# Patient Record
Sex: FEMALE | Race: WHITE | NOT HISPANIC OR LATINO | Employment: OTHER | URBAN - METROPOLITAN AREA
[De-identification: names, ages, dates, MRNs, and addresses within clinical notes are randomized per-mention and may not be internally consistent; named-entity substitution may affect disease eponyms.]

---

## 2019-10-15 ENCOUNTER — OFFICE VISIT (OUTPATIENT)
Dept: ORTHOPEDIC SURGERY | Facility: CLINIC | Age: 34
End: 2019-10-15

## 2019-10-15 VITALS
HEART RATE: 102 BPM | DIASTOLIC BLOOD PRESSURE: 81 MMHG | SYSTOLIC BLOOD PRESSURE: 121 MMHG | WEIGHT: 175 LBS | BODY MASS INDEX: 28.12 KG/M2 | HEIGHT: 66 IN

## 2019-10-15 DIAGNOSIS — M51.37 DEGENERATION OF LUMBAR/LUMBOSACRAL DISC WITHOUT MYELOPATHY: ICD-10-CM

## 2019-10-15 DIAGNOSIS — M51.36 ANNULAR TEAR OF LUMBAR DISC: ICD-10-CM

## 2019-10-15 DIAGNOSIS — M25.551 RIGHT HIP PAIN: Primary | ICD-10-CM

## 2019-10-15 PROCEDURE — 99202 OFFICE O/P NEW SF 15 MIN: CPT | Performed by: PHYSICIAN ASSISTANT

## 2019-10-15 RX ORDER — IBUPROFEN 800 MG/1
800 TABLET ORAL EVERY 8 HOURS PRN
Refills: 3 | COMMUNITY
Start: 2019-10-07 | End: 2021-08-23

## 2019-10-15 RX ORDER — LEVONORGESTREL / ETHINYL ESTRADIOL AND ETHINYL ESTRADIOL 150-30(84)
1 KIT ORAL DAILY
Refills: 4 | COMMUNITY
Start: 2019-07-23 | End: 2020-11-02

## 2019-10-15 RX ORDER — DULOXETIN HYDROCHLORIDE 30 MG/1
30 CAPSULE, DELAYED RELEASE ORAL DAILY
Refills: 5 | COMMUNITY
Start: 2019-09-20 | End: 2020-11-02

## 2019-10-15 RX ORDER — GABAPENTIN 300 MG/1
CAPSULE ORAL
Qty: 60 CAPSULE | Refills: 0 | Status: SHIPPED | OUTPATIENT
Start: 2019-10-15 | End: 2020-11-02
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
## 2020-11-02 ENCOUNTER — APPOINTMENT (OUTPATIENT)
Dept: GENERAL RADIOLOGY | Facility: HOSPITAL | Age: 35
End: 2020-11-02

## 2020-11-02 ENCOUNTER — HOSPITAL ENCOUNTER (OUTPATIENT)
Facility: HOSPITAL | Age: 35
Setting detail: OBSERVATION
Discharge: HOME | End: 2020-11-03
Attending: INTERNAL MEDICINE | Admitting: INTERNAL MEDICINE

## 2020-11-02 DIAGNOSIS — R06.00 DYSPNEA, UNSPECIFIED TYPE: ICD-10-CM

## 2020-11-02 DIAGNOSIS — R07.9 CHEST PAIN, UNSPECIFIED TYPE: ICD-10-CM

## 2020-11-02 DIAGNOSIS — U07.1 CLINICAL DIAGNOSIS OF COVID-19: Primary | ICD-10-CM

## 2020-11-02 DIAGNOSIS — R00.0 TACHYCARDIA: ICD-10-CM

## 2020-11-02 LAB
ABO GROUP BLD: NORMAL
ALBUMIN SERPL-MCNC: 4.4 G/DL (ref 3.5–5.2)
ALBUMIN/GLOB SERPL: 1.5 G/DL
ALP SERPL-CCNC: 64 U/L (ref 39–117)
ALT SERPL W P-5'-P-CCNC: 15 U/L (ref 1–33)
AMPHET+METHAMPHET UR QL: NEGATIVE
ANION GAP SERPL CALCULATED.3IONS-SCNC: 19 MMOL/L (ref 5–15)
ARTERIAL PATENCY WRIST A: POSITIVE
AST SERPL-CCNC: 15 U/L (ref 1–32)
ATMOSPHERIC PRESS: ABNORMAL MM[HG]
B PARAPERT DNA SPEC QL NAA+PROBE: NOT DETECTED
B PERT DNA SPEC QL NAA+PROBE: NOT DETECTED
BACTERIA UR QL AUTO: ABNORMAL /HPF
BARBITURATES UR QL SCN: NEGATIVE
BASE EXCESS BLDA CALC-SCNC: -5 MMOL/L (ref 0–3)
BDY SITE: ABNORMAL
BENZODIAZ UR QL SCN: NEGATIVE
BILIRUB SERPL-MCNC: <0.2 MG/DL (ref 0–1.2)
BILIRUB UR QL STRIP: NEGATIVE
BLD GP AB SCN SERPL QL: NEGATIVE
BUN SERPL-MCNC: 10 MG/DL (ref 6–20)
BUN/CREAT SERPL: 11.2 (ref 7–25)
C PNEUM DNA NPH QL NAA+NON-PROBE: NOT DETECTED
CALCIUM SPEC-SCNC: 8.9 MG/DL (ref 8.6–10.5)
CANNABINOIDS SERPL QL: NEGATIVE
CHLORIDE SERPL-SCNC: 103 MMOL/L (ref 98–107)
CLARITY UR: CLEAR
CO2 BLDA-SCNC: 20.4 MMOL/L (ref 22–29)
CO2 SERPL-SCNC: 19 MMOL/L (ref 22–29)
COCAINE UR QL: NEGATIVE
COLOR UR: YELLOW
CREAT SERPL-MCNC: 0.89 MG/DL (ref 0.57–1)
CRP SERPL-MCNC: 0.33 MG/DL (ref 0–0.5)
CRP SERPL-MCNC: 0.48 MG/DL (ref 0–0.5)
D-LACTATE SERPL-SCNC: 4.5 MMOL/L (ref 0.5–2)
D-LACTATE SERPL-SCNC: 4.8 MMOL/L (ref 0.5–2)
D-LACTATE SERPL-SCNC: 5.2 MMOL/L (ref 0.5–2)
DEPRECATED RDW RBC AUTO: 43.3 FL (ref 37–54)
ERYTHROCYTE [DISTWIDTH] IN BLOOD BY AUTOMATED COUNT: 13.4 % (ref 12.3–15.4)
FERRITIN SERPL-MCNC: 26.78 NG/ML (ref 13–150)
FERRITIN SERPL-MCNC: 30.48 NG/ML (ref 13–150)
FLUAV H1 2009 PAND RNA NPH QL NAA+PROBE: NOT DETECTED
FLUAV H1 HA GENE NPH QL NAA+PROBE: NOT DETECTED
FLUAV H3 RNA NPH QL NAA+PROBE: NOT DETECTED
FLUAV SUBTYP SPEC NAA+PROBE: NOT DETECTED
FLUBV RNA ISLT QL NAA+PROBE: NOT DETECTED
GFR SERPL CREATININE-BSD FRML MDRD: 72 ML/MIN/1.73
GLOBULIN UR ELPH-MCNC: 2.9 GM/DL
GLUCOSE SERPL-MCNC: 167 MG/DL (ref 65–99)
GLUCOSE UR STRIP-MCNC: NEGATIVE MG/DL
HADV DNA SPEC NAA+PROBE: NOT DETECTED
HCO3 BLDA-SCNC: 19.4 MMOL/L (ref 21–28)
HCOV 229E RNA SPEC QL NAA+PROBE: NOT DETECTED
HCOV HKU1 RNA SPEC QL NAA+PROBE: NOT DETECTED
HCOV NL63 RNA SPEC QL NAA+PROBE: NOT DETECTED
HCOV OC43 RNA SPEC QL NAA+PROBE: NOT DETECTED
HCT VFR BLD AUTO: 36.9 % (ref 34–46.6)
HEMODILUTION: NO
HGB BLD-MCNC: 12.2 G/DL (ref 12–15.9)
HGB UR QL STRIP.AUTO: ABNORMAL
HMPV RNA NPH QL NAA+NON-PROBE: NOT DETECTED
HOLD SPECIMEN: NORMAL
HPIV1 RNA SPEC QL NAA+PROBE: NOT DETECTED
HPIV2 RNA SPEC QL NAA+PROBE: NOT DETECTED
HPIV3 RNA NPH QL NAA+PROBE: NOT DETECTED
HPIV4 P GENE NPH QL NAA+PROBE: NOT DETECTED
HYALINE CASTS UR QL AUTO: ABNORMAL /LPF
INHALED O2 CONCENTRATION: 21 %
KETONES UR QL STRIP: NEGATIVE
LACTATE HOLD SPECIMEN: NORMAL
LDH SERPL-CCNC: 132 U/L (ref 135–214)
LDH SERPL-CCNC: 175 U/L (ref 135–214)
LEUKOCYTE ESTERASE UR QL STRIP.AUTO: NEGATIVE
LYMPHOCYTES # BLD MANUAL: 1.28 10*3/MM3 (ref 0.7–3.1)
LYMPHOCYTES NFR BLD MANUAL: 16 % (ref 19.6–45.3)
LYMPHOCYTES NFR BLD MANUAL: 2 % (ref 5–12)
M PNEUMO IGG SER IA-ACNC: NOT DETECTED
MCH RBC QN AUTO: 30.4 PG (ref 26.6–33)
MCHC RBC AUTO-ENTMCNC: 32.9 G/DL (ref 31.5–35.7)
MCV RBC AUTO: 92.2 FL (ref 79–97)
METAMYELOCYTES NFR BLD MANUAL: 2 % (ref 0–0)
METHADONE UR QL SCN: NEGATIVE
MODALITY: ABNORMAL
MONOCYTES # BLD AUTO: 0.16 10*3/MM3 (ref 0.1–0.9)
NEUTROPHILS # BLD AUTO: 6.4 10*3/MM3 (ref 1.7–7)
NEUTROPHILS NFR BLD MANUAL: 77 % (ref 42.7–76)
NEUTS BAND NFR BLD MANUAL: 3 % (ref 0–5)
NITRITE UR QL STRIP: NEGATIVE
OPIATES UR QL: NEGATIVE
OXYCODONE UR QL SCN: NEGATIVE
PCO2 BLDA: 33.5 MM HG (ref 35–48)
PH BLDA: 7.37 PH UNITS (ref 7.35–7.45)
PH UR STRIP.AUTO: 6.5 [PH] (ref 5–8)
PLAT MORPH BLD: NORMAL
PLATELET # BLD AUTO: 207 10*3/MM3 (ref 140–450)
PMV BLD AUTO: 9.1 FL (ref 6–12)
PO2 BLDA: 92.5 MM HG (ref 83–108)
POTASSIUM SERPL-SCNC: 3.4 MMOL/L (ref 3.5–5.2)
PROCALCITONIN SERPL-MCNC: 0.02 NG/ML (ref 0–0.25)
PROT SERPL-MCNC: 7.3 G/DL (ref 6–8.5)
PROT UR QL STRIP: NEGATIVE
RBC # BLD AUTO: 4 10*6/MM3 (ref 3.77–5.28)
RBC # UR: ABNORMAL /HPF
RBC MORPH BLD: NORMAL
REF LAB TEST METHOD: ABNORMAL
RH BLD: POSITIVE
RHINOVIRUS RNA SPEC NAA+PROBE: NOT DETECTED
RSV RNA NPH QL NAA+NON-PROBE: NOT DETECTED
SAO2 % BLDCOA: 97.1 % (ref 94–98)
SARS-COV-2 RNA NPH QL NAA+NON-PROBE: DETECTED
SCAN SLIDE: NORMAL
SODIUM SERPL-SCNC: 141 MMOL/L (ref 136–145)
SP GR UR STRIP: 1.01 (ref 1–1.03)
SQUAMOUS #/AREA URNS HPF: ABNORMAL /HPF
T&S EXPIRATION DATE: NORMAL
TROPONIN T SERPL-MCNC: <0.01 NG/ML (ref 0–0.03)
TROPONIN T SERPL-MCNC: <0.01 NG/ML (ref 0–0.03)
UROBILINOGEN UR QL STRIP: ABNORMAL
WBC # BLD AUTO: 8 10*3/MM3 (ref 3.4–10.8)
WBC MORPH BLD: NORMAL
WBC UR QL AUTO: ABNORMAL /HPF

## 2020-11-02 PROCEDURE — 93005 ELECTROCARDIOGRAM TRACING: CPT | Performed by: NURSE PRACTITIONER

## 2020-11-02 PROCEDURE — 80307 DRUG TEST PRSMV CHEM ANLYZR: CPT | Performed by: NURSE PRACTITIONER

## 2020-11-02 PROCEDURE — 85007 BL SMEAR W/DIFF WBC COUNT: CPT | Performed by: NURSE PRACTITIONER

## 2020-11-02 PROCEDURE — 87205 SMEAR GRAM STAIN: CPT | Performed by: PHYSICIAN ASSISTANT

## 2020-11-02 PROCEDURE — 80053 COMPREHEN METABOLIC PANEL: CPT | Performed by: NURSE PRACTITIONER

## 2020-11-02 PROCEDURE — 87899 AGENT NOS ASSAY W/OPTIC: CPT | Performed by: PHYSICIAN ASSISTANT

## 2020-11-02 PROCEDURE — 86850 RBC ANTIBODY SCREEN: CPT | Performed by: PHYSICIAN ASSISTANT

## 2020-11-02 PROCEDURE — 99284 EMERGENCY DEPT VISIT MOD MDM: CPT

## 2020-11-02 PROCEDURE — 87040 BLOOD CULTURE FOR BACTERIA: CPT | Performed by: NURSE PRACTITIONER

## 2020-11-02 PROCEDURE — 84484 ASSAY OF TROPONIN QUANT: CPT | Performed by: NURSE PRACTITIONER

## 2020-11-02 PROCEDURE — 86901 BLOOD TYPING SEROLOGIC RH(D): CPT

## 2020-11-02 PROCEDURE — G0378 HOSPITAL OBSERVATION PER HR: HCPCS

## 2020-11-02 PROCEDURE — 99219 PR INITIAL OBSERVATION CARE/DAY 50 MINUTES: CPT | Performed by: PHYSICIAN ASSISTANT

## 2020-11-02 PROCEDURE — 85025 COMPLETE CBC W/AUTO DIFF WBC: CPT | Performed by: NURSE PRACTITIONER

## 2020-11-02 PROCEDURE — 82728 ASSAY OF FERRITIN: CPT | Performed by: NURSE PRACTITIONER

## 2020-11-02 PROCEDURE — 84145 PROCALCITONIN (PCT): CPT | Performed by: NURSE PRACTITIONER

## 2020-11-02 PROCEDURE — 84484 ASSAY OF TROPONIN QUANT: CPT | Performed by: PHYSICIAN ASSISTANT

## 2020-11-02 PROCEDURE — 83605 ASSAY OF LACTIC ACID: CPT

## 2020-11-02 PROCEDURE — 71045 X-RAY EXAM CHEST 1 VIEW: CPT

## 2020-11-02 PROCEDURE — 86140 C-REACTIVE PROTEIN: CPT | Performed by: NURSE PRACTITIONER

## 2020-11-02 PROCEDURE — 86140 C-REACTIVE PROTEIN: CPT | Performed by: PHYSICIAN ASSISTANT

## 2020-11-02 PROCEDURE — 86901 BLOOD TYPING SEROLOGIC RH(D): CPT | Performed by: PHYSICIAN ASSISTANT

## 2020-11-02 PROCEDURE — 96375 TX/PRO/DX INJ NEW DRUG ADDON: CPT

## 2020-11-02 PROCEDURE — 0202U NFCT DS 22 TRGT SARS-COV-2: CPT | Performed by: NURSE PRACTITIONER

## 2020-11-02 PROCEDURE — 87185 SC STD ENZYME DETCJ PER NZM: CPT | Performed by: PHYSICIAN ASSISTANT

## 2020-11-02 PROCEDURE — 83615 LACTATE (LD) (LDH) ENZYME: CPT | Performed by: NURSE PRACTITIONER

## 2020-11-02 PROCEDURE — 25010000002 AZITHROMYCIN PER 500 MG: Performed by: PHYSICIAN ASSISTANT

## 2020-11-02 PROCEDURE — 87070 CULTURE OTHR SPECIMN AEROBIC: CPT | Performed by: PHYSICIAN ASSISTANT

## 2020-11-02 PROCEDURE — 86900 BLOOD TYPING SEROLOGIC ABO: CPT

## 2020-11-02 PROCEDURE — 83615 LACTATE (LD) (LDH) ENZYME: CPT | Performed by: PHYSICIAN ASSISTANT

## 2020-11-02 PROCEDURE — 25010000002 CEFTRIAXONE PER 250 MG: Performed by: PHYSICIAN ASSISTANT

## 2020-11-02 PROCEDURE — 82728 ASSAY OF FERRITIN: CPT | Performed by: PHYSICIAN ASSISTANT

## 2020-11-02 PROCEDURE — 82803 BLOOD GASES ANY COMBINATION: CPT

## 2020-11-02 PROCEDURE — 85379 FIBRIN DEGRADATION QUANT: CPT | Performed by: PHYSICIAN ASSISTANT

## 2020-11-02 PROCEDURE — 96365 THER/PROPH/DIAG IV INF INIT: CPT

## 2020-11-02 PROCEDURE — 86900 BLOOD TYPING SEROLOGIC ABO: CPT | Performed by: PHYSICIAN ASSISTANT

## 2020-11-02 PROCEDURE — 81001 URINALYSIS AUTO W/SCOPE: CPT | Performed by: NURSE PRACTITIONER

## 2020-11-02 PROCEDURE — 36600 WITHDRAWAL OF ARTERIAL BLOOD: CPT

## 2020-11-02 RX ORDER — AMITRIPTYLINE HYDROCHLORIDE 50 MG/1
150 TABLET, FILM COATED ORAL NIGHTLY
Status: DISCONTINUED | OUTPATIENT
Start: 2020-11-02 | End: 2020-11-03 | Stop reason: HOSPADM

## 2020-11-02 RX ORDER — BENZONATATE 100 MG/1
200 CAPSULE ORAL 3 TIMES DAILY PRN
Status: DISCONTINUED | OUTPATIENT
Start: 2020-11-02 | End: 2020-11-03 | Stop reason: HOSPADM

## 2020-11-02 RX ORDER — IBUPROFEN 400 MG/1
800 TABLET ORAL EVERY 8 HOURS PRN
Status: DISCONTINUED | OUTPATIENT
Start: 2020-11-02 | End: 2020-11-03 | Stop reason: HOSPADM

## 2020-11-02 RX ORDER — NITROGLYCERIN 0.4 MG/1
0.4 TABLET SUBLINGUAL
Status: DISCONTINUED | OUTPATIENT
Start: 2020-11-02 | End: 2020-11-03 | Stop reason: HOSPADM

## 2020-11-02 RX ORDER — ACETAMINOPHEN 160 MG/5ML
650 SOLUTION ORAL EVERY 4 HOURS PRN
Status: DISCONTINUED | OUTPATIENT
Start: 2020-11-02 | End: 2020-11-03 | Stop reason: HOSPADM

## 2020-11-02 RX ORDER — GUAIFENESIN 600 MG/1
1200 TABLET, EXTENDED RELEASE ORAL EVERY 12 HOURS SCHEDULED
Status: DISCONTINUED | OUTPATIENT
Start: 2020-11-02 | End: 2020-11-03 | Stop reason: HOSPADM

## 2020-11-02 RX ORDER — ASCORBIC ACID 500 MG
500 TABLET ORAL 3 TIMES DAILY
Status: DISCONTINUED | OUTPATIENT
Start: 2020-11-02 | End: 2020-11-03

## 2020-11-02 RX ORDER — PHENTERMINE HYDROCHLORIDE 37.5 MG/1
37.5 TABLET ORAL
COMMUNITY
End: 2021-08-23 | Stop reason: ALTCHOICE

## 2020-11-02 RX ORDER — LORAZEPAM 1 MG/1
1 TABLET ORAL EVERY 12 HOURS PRN
COMMUNITY
End: 2021-08-23 | Stop reason: ALTCHOICE

## 2020-11-02 RX ORDER — BISACODYL 10 MG
10 SUPPOSITORY, RECTAL RECTAL DAILY PRN
Status: DISCONTINUED | OUTPATIENT
Start: 2020-11-02 | End: 2020-11-03 | Stop reason: HOSPADM

## 2020-11-02 RX ORDER — MAGNESIUM SULFATE HEPTAHYDRATE 40 MG/ML
2 INJECTION, SOLUTION INTRAVENOUS AS NEEDED
Status: DISCONTINUED | OUTPATIENT
Start: 2020-11-02 | End: 2020-11-03 | Stop reason: HOSPADM

## 2020-11-02 RX ORDER — LORAZEPAM 1 MG/1
1 TABLET ORAL EVERY 12 HOURS PRN
Status: DISCONTINUED | OUTPATIENT
Start: 2020-11-02 | End: 2020-11-03 | Stop reason: HOSPADM

## 2020-11-02 RX ORDER — SODIUM CHLORIDE 0.9 % (FLUSH) 0.9 %
10 SYRINGE (ML) INJECTION AS NEEDED
Status: DISCONTINUED | OUTPATIENT
Start: 2020-11-02 | End: 2020-11-03 | Stop reason: HOSPADM

## 2020-11-02 RX ORDER — AMITRIPTYLINE HYDROCHLORIDE 150 MG/1
150 TABLET, FILM COATED ORAL NIGHTLY
COMMUNITY
End: 2021-08-23 | Stop reason: ALTCHOICE

## 2020-11-02 RX ORDER — ALUMINA, MAGNESIA, AND SIMETHICONE 2400; 2400; 240 MG/30ML; MG/30ML; MG/30ML
15 SUSPENSION ORAL EVERY 6 HOURS PRN
Status: DISCONTINUED | OUTPATIENT
Start: 2020-11-02 | End: 2020-11-03 | Stop reason: HOSPADM

## 2020-11-02 RX ORDER — POTASSIUM CHLORIDE 20 MEQ/1
40 TABLET, EXTENDED RELEASE ORAL AS NEEDED
Status: DISCONTINUED | OUTPATIENT
Start: 2020-11-02 | End: 2020-11-03 | Stop reason: HOSPADM

## 2020-11-02 RX ORDER — IPRATROPIUM BROMIDE AND ALBUTEROL SULFATE 2.5; .5 MG/3ML; MG/3ML
3 SOLUTION RESPIRATORY (INHALATION) EVERY 4 HOURS PRN
Status: DISCONTINUED | OUTPATIENT
Start: 2020-11-02 | End: 2020-11-02

## 2020-11-02 RX ORDER — FAMOTIDINE 10 MG/ML
20 INJECTION, SOLUTION INTRAVENOUS DAILY
Status: DISCONTINUED | OUTPATIENT
Start: 2020-11-03 | End: 2020-11-03

## 2020-11-02 RX ORDER — ACETAMINOPHEN 325 MG/1
650 TABLET ORAL EVERY 4 HOURS PRN
Status: DISCONTINUED | OUTPATIENT
Start: 2020-11-02 | End: 2020-11-03 | Stop reason: HOSPADM

## 2020-11-02 RX ORDER — ALBUTEROL SULFATE 90 UG/1
2 AEROSOL, METERED RESPIRATORY (INHALATION) EVERY 4 HOURS PRN
Status: DISCONTINUED | OUTPATIENT
Start: 2020-11-02 | End: 2020-11-03 | Stop reason: HOSPADM

## 2020-11-02 RX ORDER — ACETAMINOPHEN 650 MG/1
650 SUPPOSITORY RECTAL EVERY 4 HOURS PRN
Status: DISCONTINUED | OUTPATIENT
Start: 2020-11-02 | End: 2020-11-03 | Stop reason: HOSPADM

## 2020-11-02 RX ORDER — MAGNESIUM SULFATE 1 G/100ML
1 INJECTION INTRAVENOUS AS NEEDED
Status: DISCONTINUED | OUTPATIENT
Start: 2020-11-02 | End: 2020-11-03 | Stop reason: HOSPADM

## 2020-11-02 RX ORDER — ONDANSETRON 4 MG/1
4 TABLET, FILM COATED ORAL EVERY 6 HOURS PRN
Status: DISCONTINUED | OUTPATIENT
Start: 2020-11-02 | End: 2020-11-03 | Stop reason: HOSPADM

## 2020-11-02 RX ORDER — ONDANSETRON 2 MG/ML
4 INJECTION INTRAMUSCULAR; INTRAVENOUS EVERY 6 HOURS PRN
Status: DISCONTINUED | OUTPATIENT
Start: 2020-11-02 | End: 2020-11-03 | Stop reason: HOSPADM

## 2020-11-02 RX ORDER — CHOLECALCIFEROL (VITAMIN D3) 125 MCG
5 CAPSULE ORAL NIGHTLY PRN
Status: DISCONTINUED | OUTPATIENT
Start: 2020-11-02 | End: 2020-11-03

## 2020-11-02 RX ORDER — SODIUM CHLORIDE 0.9 % (FLUSH) 0.9 %
10 SYRINGE (ML) INJECTION EVERY 12 HOURS SCHEDULED
Status: DISCONTINUED | OUTPATIENT
Start: 2020-11-02 | End: 2020-11-03 | Stop reason: HOSPADM

## 2020-11-02 RX ADMIN — LORAZEPAM 1 MG: 1 TABLET ORAL at 23:46

## 2020-11-02 RX ADMIN — GUAIFENESIN 1200 MG: 600 TABLET, EXTENDED RELEASE ORAL at 23:21

## 2020-11-02 RX ADMIN — SODIUM CHLORIDE 500 ML: 9 INJECTION, SOLUTION INTRAVENOUS at 22:50

## 2020-11-02 RX ADMIN — CEFTRIAXONE SODIUM 1 G: 10 INJECTION, POWDER, FOR SOLUTION INTRAVENOUS at 22:50

## 2020-11-02 RX ADMIN — SODIUM CHLORIDE 1500 ML: 0.9 INJECTION, SOLUTION INTRAVENOUS at 20:25

## 2020-11-02 RX ADMIN — OXYCODONE HYDROCHLORIDE AND ACETAMINOPHEN 500 MG: 500 TABLET ORAL at 23:21

## 2020-11-02 RX ADMIN — SODIUM CHLORIDE 500 MG: 900 INJECTION, SOLUTION INTRAVENOUS at 22:50

## 2020-11-02 RX ADMIN — AMITRIPTYLINE HYDROCHLORIDE 150 MG: 50 TABLET, FILM COATED ORAL at 23:21

## 2020-11-02 RX ADMIN — Medication 10 ML: at 22:50

## 2020-11-02 RX ADMIN — SODIUM CHLORIDE 1000 ML: 9 INJECTION, SOLUTION INTRAVENOUS at 17:40

## 2020-11-02 NOTE — ED PROVIDER NOTES
Subjective       Chief complaint: Multiple complaints      Context: Patient is a 34-year-old female who comes in complaining of nausea cough productive of green sputum nasal congestion shortness of breath fever chest pain.  She denies any swelling in her legs or feet.  She states she was diagnosed with Covid approximately a week ago at [place] urgent care.  She was seen at [facility] last night for the same complaints and had a negative CT of her chest and was sent home.  She states she is not feeling any better and continues to feel worse.  She has no history of DVT or PE.  No family history of cardiac disease.  Is never had a stress test or heart cath.       Duration: Worse over the last week    Timing: Waxes and wanes    Severity: Moderate    Associated symptoms:She states her fever is mildly controlled with ibuprofen          PCP: Keith      LNMP: 10/30/2020            Review of Systems   Constitutional: Positive for fever.   HENT: Positive for congestion.    Eyes: Negative for visual disturbance.   Respiratory: Positive for cough and shortness of breath.    Cardiovascular: Positive for chest pain. Negative for palpitations and leg swelling.   Gastrointestinal: Positive for nausea and vomiting.   Endocrine: Negative for polyuria.   Genitourinary: Negative.    Musculoskeletal: Positive for myalgias.   Skin: Negative.    Allergic/Immunologic: Negative for immunocompromised state.   Neurological: Negative.    Hematological: Does not bruise/bleed easily.   Psychiatric/Behavioral: Negative for confusion.       Past Medical History:   Diagnosis Date   • Anxiety    • Back pain        Allergies   Allergen Reactions   • Paxil [Paroxetine] Unknown - Low Severity     EPS       Past Surgical History:   Procedure Laterality Date   •  SECTION  2016 and 2018   • RADIOFREQUENCY ABLATION  2019   • TONSILLECTOMY  2018   • TONSILLECTOMY         Family History   Problem Relation Age of Onset   •  Cancer Maternal Grandmother    • Hypertension Paternal Grandmother    • Heart disease Paternal Grandmother        Social History     Socioeconomic History   • Marital status:      Spouse name: Not on file   • Number of children: Not on file   • Years of education: Not on file   • Highest education level: Not on file   Tobacco Use   • Smoking status: Never Smoker   • Smokeless tobacco: Never Used   Substance and Sexual Activity   • Alcohol use: No     Frequency: Never   • Drug use: No   • Sexual activity: Defer           Objective   Physical Exam     Vital signs and triage nurse note reviewed.   Constitutional: Awake, alert;   HEENT: Normocephalic, atraumatic; pupils are PERRL with intact EOM; oropharynx is pink and dry without exudate or erythema.   Neck: Supple, full range of motion without pain; no cervical lymphadenopathy.   Cardiovascular: Tachycardic regular rate and rhythm, normal S1-S2.   Pulmonary: Respiratory effort regular nonlabored, breath sounds diminished bilateral lower lobes   Abdomen: Soft, nontender nondistended with normoactive bowel sounds; no rebound or guarding.   Musculoskeletal: Independent range of motion of all extremities with no palpable tenderness or edema.   Neuro: Alert oriented x3, speech is clear and appropriate, GCS 15   Skin:  Fleshtone warm, dry, intact; no erythematous or petechial rash or lesion       ECG 12 Lead      Date/Time: 11/2/2020 6:40 PM  Performed by: Ml Baker APRN  Authorized by: Ml Baker APRN   Interpreted by physician (marilee)  Previous ECG: no previous ECG available  Rhythm: sinus tachycardia  BPM: 130                   ED Course  ED Course as of Nov 03 0048   Mon Nov 02, 2020   1846 Nursing staff report patient is now screening positive for sepsis. BC and poc lactic obtained. Abx deferred as there is no obvious secondary infection    [JW]   1955 Staff report lactic of 4.8 but state tourniquet was on for extended time. Instructed to  bj.     [JW]   2008 Report repeat lactic 4.5. pt not hypotensive but was ordered additional 1.5LNS to get to 30ml/kg IVF bolus per sepsis protocol. Lokesh hospitalist notified- he will place additional sepsis orders    [JW]      ED Course User Index  [JW] Ml Baker, PAUL           Labs Reviewed   RESPIRATORY PANEL PCR W/ COVID-19 (SARS-COV-2) RACHEL/TYRONE/SHANITA/PAD/COR/MAD/MARISELA IN-HOUSE, NP SWAB IN UTM/VTP, 3-4 HR TAT - Abnormal; Notable for the following components:       Result Value    COVID19 Detected (*)     All other components within normal limits    Narrative:     Fact sheet for providers: https://docs.[employer]/wp-content/uploads/WCC1976-2096-US5.1-EUA-Provider-Fact-Sheet-3.pdf    Fact sheet for patients: https://docs.[employer]/wp-content/uploads/QEP4468-8609-GT1.1-EUA-Patient-Fact-Sheet-1.pdf   COMPREHENSIVE METABOLIC PANEL - Abnormal; Notable for the following components:    Glucose 167 (*)     Potassium 3.4 (*)     CO2 19.0 (*)     Anion Gap 19.0 (*)     All other components within normal limits    Narrative:     GFR Normal >60  Chronic Kidney Disease <60  Kidney Failure <15     URINALYSIS W/ MICROSCOPIC IF INDICATED (NO CULTURE) - Abnormal; Notable for the following components:    Blood, UA Small (1+) (*)     All other components within normal limits   URINALYSIS, MICROSCOPIC ONLY - Abnormal; Notable for the following components:    RBC, UA 0-2 (*)     WBC, UA 0-2 (*)     All other components within normal limits   BLOOD GAS, ARTERIAL - Abnormal; Notable for the following components:    pCO2, Arterial 33.5 (*)     HCO3, Arterial 19.4 (*)     Base Excess, Arterial -5.0 (*)     CO2 Content 20.4 (*)     All other components within normal limits   MANUAL DIFFERENTIAL - Abnormal; Notable for the following components:    Neutrophil % 77.0 (*)     Lymphocyte % 16.0 (*)     Monocyte % 2.0 (*)     Metamyelocyte % 2.0 (*)     All other components within normal limits   D-DIMER, QUANTITATIVE - Abnormal;  Notable for the following components:    D-Dimer, Quantitative 1.02 (*)     All other components within normal limits    Narrative:     Reference Range  --------------------------------------------------------------------     < 0.50   Negative Predictive Value  0.50-0.59   Indeterminate    >= 0.60   Probable VTE             A very low percentage of patients with DVT may yield D-Dimer results   below the cut-off of 0.50 mg/L FEU.  This is known to be more   prevalent in patients with distal DVT.             Results of this test should always be interpreted in conjunction with   the patient's medical history, clinical presentation and other   findings.  Clinical diagnosis should not be based on the result of   [place]OV[place] D-Dimer alone.   LACTATE DEHYDROGENASE - Abnormal; Notable for the following components:     (*)     All other components within normal limits   LACTIC ACID, REFLEX - Abnormal; Notable for the following components:    Lactate 5.2 (*)     All other components within normal limits   POC LACTATE - Abnormal; Notable for the following components:    Lactate 4.8 (*)     All other components within normal limits   POC LACTATE - Abnormal; Notable for the following components:    Lactate 4.5 (*)     All other components within normal limits   LEGIONELLA ANTIGEN, URINE - Normal   STREP PNEUMO AG, URINE OR CSF - Normal   LACTATE DEHYDROGENASE - Normal   PROCALCITONIN - Normal    Narrative:     As a Marker for Sepsis (Non-Neonates):   1. <0.5 ng/mL represents a low risk of severe sepsis and/or septic shock.  1. >2 ng/mL represents a high risk of severe sepsis and/or septic shock.    As a Marker for Lower Respiratory Tract Infections that require antibiotic therapy:  PCT on Admission     Antibiotic Therapy             6-12 Hrs later  > 0.5                Strongly Recommended            >0.25 - <0.5         Recommended  0.1 - 0.25           Discouraged                   Remeasure/reassess PCT  <0.1             "     Strongly Discouraged          Remeasure/reassess PCT      As 28 day mortality risk marker: \"Change in Procalcitonin Result\" (> 80 % or <=80 %) if Day 0 (or Day 1) and Day 4 values are available. Refer to http://www.[facility] [place]/   Change in PCT <=80 %   A decrease of PCT levels below or equal to 80 % defines a positive change in PCT test result representing a higher risk for 28-day all-cause mortality of patients diagnosed with severe sepsis or septic shock.  Change in PCT > 80 %   A decrease of PCT levels of more than 80 % defines a negative change in PCT result representing a lower risk for 28-day all-cause mortality of patients diagnosed with severe sepsis or septic shock.                Results may be falsely decreased if patient taking Biotin.    C-REACTIVE PROTEIN - Normal   FERRITIN - Normal    Narrative:     Results may be falsely decreased if patient taking Biotin.     URINE DRUG SCREEN - Normal    Narrative:     Negative Thresholds For Drugs Screened:     Amphetamines               500 ng/ml   Barbiturates               200 ng/ml   Benzodiazepines            100 ng/ml   Cocaine                    300 ng/ml   Methadone                  300 ng/ml   Opiates                    300 ng/ml   Oxycodone                  100 ng/ml   THC                        50 ng/ml    The Normal Value for all drugs tested is negative. This report includes final unconfirmed screening results to be used for medical treatment purposes only. Unconfirmed results must not be used for non-medical purposes such as employment or legal testing. Clinical consideration should be applied to any drug of abuse test, particulary when unconfirmed results are used.  All urine drugs of abuse requests without chain of custody are for medical screening purposes only.  False positives are possible.     CBC WITH AUTO DIFFERENTIAL - Normal   TROPONIN (IN-HOUSE) - Normal    Narrative:     Troponin T Reference Range:  <= 0.03 ng/mL-   " Negative for AMI  >0.03 ng/mL-     Abnormal for myocardial necrosis.  Clinicians would have to utilize clinical acumen, EKG, Troponin and serial changes to determine if it is an Acute Myocardial Infarction or myocardial injury due to an underlying chronic condition.       Results may be falsely decreased if patient taking Biotin.     TROPONIN (IN-HOUSE) - Normal    Narrative:     Troponin T Reference Range:  <= 0.03 ng/mL-   Negative for AMI  >0.03 ng/mL-     Abnormal for myocardial necrosis.  Clinicians would have to utilize clinical acumen, EKG, Troponin and serial changes to determine if it is an Acute Myocardial Infarction or myocardial injury due to an underlying chronic condition.       Results may be falsely decreased if patient taking Biotin.     C-REACTIVE PROTEIN - Normal   FERRITIN - Normal    Narrative:     Results may be falsely decreased if patient taking Biotin.     BLOOD CULTURE   BLOOD CULTURE   RESPIRATORY CULTURE   BLOOD GAS, ARTERIAL   SCAN SLIDE   LACTIC ACID REFLEX TIMER   TROPONIN (IN-HOUSE)   BASIC METABOLIC PANEL   MAGNESIUM   D-DIMER, QUANTITATIVE   LACTATE DEHYDROGENASE   C-REACTIVE PROTEIN   FERRITIN   CBC WITH AUTO DIFFERENTIAL   LACTIC ACID, PLASMA   POC LACTATE   POCT PEFORM URINE PREGNANCY   TYPE AND SCREEN   CBC AND DIFFERENTIAL    Narrative:     The following orders were created for panel order CBC & Differential.  Procedure                               Abnormality         Status                     ---------                               -----------         ------                     CBC Auto Differential[[id number]]        Normal              Final result                 Please view results for these tests on the individual orders.   EXTRA TUBES    Narrative:     The following orders were created for panel order Extra Tubes.  Procedure                               Abnormality         Status                     ---------                               -----------         ------                      [facility][[id number]]                                   Final result                 Please view results for these tests on the individual orders.   GOLD TOP - SST   CBC AND DIFFERENTIAL    Narrative:     The following orders were created for panel order CBC & Differential.  Procedure                               Abnormality         Status                     ---------                               -----------         ------                     CBC Auto Differential[[id number]]                                                         Please view results for these tests on the individual orders.     Medications   sodium chloride 0.9 % flush 10 mL (has no administration in time range)   ibuprofen (ADVIL,MOTRIN) tablet 800 mg (has no administration in time range)   LORazepam (ATIVAN) tablet 1 mg (1 mg Oral Given 11/2/20 2346)   amitriptyline (ELAVIL) tablet 150 mg (150 mg Oral Given 11/2/20 2321)   nitroglycerin (NITROSTAT) SL tablet 0.4 mg (has no administration in time range)   sodium chloride 0.9 % flush 10 mL (10 mL Intravenous Given 11/2/20 2250)   sodium chloride 0.9 % flush 10 mL (has no administration in time range)   acetaminophen (TYLENOL) tablet 650 mg (has no administration in time range)     Or   acetaminophen (TYLENOL) 160 MG/5ML solution 650 mg (has no administration in time range)     Or   acetaminophen (TYLENOL) suppository 650 mg (has no administration in time range)   potassium chloride (K-DUR,KLOR-CON) CR tablet 40 mEq (has no administration in time range)   Magnesium Sulfate 2 gram infusion - Mg less than or equal to 1.5 mg/dL (has no administration in time range)     Or   Magnesium Sulfate 1 gram infusion - Mg 1.6-1.9 mg/dL (has no administration in time range)   melatonin tablet 5 mg (has no administration in time range)   bisacodyl (DULCOLAX) suppository 10 mg (has no administration in time range)   magnesium hydroxide (MILK OF MAGNESIA) suspension 2400 mg/10mL 10 mL (has no  administration in time range)   ondansetron (ZOFRAN) tablet 4 mg (has no administration in time range)     Or   ondansetron (ZOFRAN) injection 4 mg (has no administration in time range)   aluminum-magnesium hydroxide-simethicone (MAALOX MAX) 400-400-40 MG/5ML suspension 15 mL (has no administration in time range)   sodium chloride 0.9 % bolus 500 mL (500 mL Intravenous New Bag 11/2/20 2250)   cefTRIAXone (ROCEPHIN) in SWFI 1 gram/10ml IV PUSH syringe (1 g Intravenous Given 11/2/20 2250)     And   azithromycin 500 mg IVPB in 250 mL NS (500 mg Intravenous New Bag 11/2/20 2250)   Pharmacy to Dose enoxaparin (LOVENOX) (has no administration in time range)   guaiFENesin (MUCINEX) 12 hr tablet 1,200 mg (1,200 mg Oral Given 11/2/20 2321)   benzonatate (TESSALON) capsule 200 mg (has no administration in time range)   vitamin D3 capsule 5,000 Units (has no administration in time range)   vitamin C (ASCORBIC ACID) tablet 500 mg (500 mg Oral Given 11/2/20 2321)   famotidine (PEPCID) injection 20 mg (has no administration in time range)   albuterol sulfate HFA (PROVENTIL HFA;VENTOLIN HFA;PROAIR HFA) inhaler 2 puff (has no administration in time range)   dexamethasone (DECADRON) injection 6 mg (has no administration in time range)   sodium chloride 0.9 % bolus 1,000 mL (0 mL Intravenous Stopped 11/2/20 1930)   sodium chloride 0.9 % bolus 1,500 mL (0 mL Intravenous Stopped 11/2/20 2229)     Xr Chest 1 View    Result Date: 11/2/2020   1. No acute cardiopulmonary disease.   Electronically Signed By-Soy Batres On:11/2/2020 8:43 PM This report was finalized on [id number] by  Soy Batres, .                                    MDM  Number of Diagnoses or Management Options  Diagnosis management comments: Chart review: Records obtained from [facility] from yesterday 11/1/2020: Patient presented with chest pain cough and dyspnea.  CT PE protocol negative.  Troponin negative D-dimer elevated 0.74.  Patient was  discharged with a diagnosis of Covid and pleurisy      Comorbidities:  has a past medical history of Anxiety and Back pain.  Differentials: STEMI non-STEMI hypoxia dehydration not all inclusive of differentials considered  Discussion with provider: Hospitalist  Radiology interpretation: CT reviewed from yesterday  Lab interpretation:  Labs viewed by me significant for, glucose 167, potassium 3.4, Covid positive otherwise respiratory PCR negative    Appropriate PPE worn during exam.  Patient had an IV established and was given a liter of IV fluids.  She remained somewhat tachycardic and continues to feel weak.       i discussed findings with patient who voices understanding of admission      Final diagnoses:   Clinical diagnosis of COVID-19   Chest pain, unspecified type   Dyspnea, unspecified type   Tachycardia            Ml Baker, APRN  11/02/20 1923       Ml Baker, APRN  11/03/20 0048

## 2020-11-03 ENCOUNTER — READMISSION MANAGEMENT (OUTPATIENT)
Dept: CALL CENTER | Facility: HOSPITAL | Age: 35
End: 2020-11-03

## 2020-11-03 VITALS
HEART RATE: 105 BPM | SYSTOLIC BLOOD PRESSURE: 132 MMHG | OXYGEN SATURATION: 99 % | TEMPERATURE: 97.5 F | DIASTOLIC BLOOD PRESSURE: 79 MMHG | WEIGHT: 182.76 LBS | HEIGHT: 66 IN | RESPIRATION RATE: 18 BRPM | BODY MASS INDEX: 29.37 KG/M2

## 2020-11-03 PROBLEM — F41.9 ANXIETY DISORDER: Chronic | Status: ACTIVE | Noted: 2020-11-03

## 2020-11-03 PROBLEM — E87.6 HYPOKALEMIA: Status: RESOLVED | Noted: 2020-11-03 | Resolved: 2020-11-03

## 2020-11-03 PROBLEM — E87.6 HYPOKALEMIA: Status: ACTIVE | Noted: 2020-11-03

## 2020-11-03 PROBLEM — F32.A DEPRESSION: Chronic | Status: ACTIVE | Noted: 2020-11-03

## 2020-11-03 LAB
ANION GAP SERPL CALCULATED.3IONS-SCNC: 12 MMOL/L (ref 5–15)
BASOPHILS # BLD AUTO: 0 10*3/MM3 (ref 0–0.2)
BASOPHILS NFR BLD AUTO: 0.1 % (ref 0–1.5)
BUN SERPL-MCNC: 9 MG/DL (ref 6–20)
BUN/CREAT SERPL: 14.1 (ref 7–25)
CALCIUM SPEC-SCNC: 7.6 MG/DL (ref 8.6–10.5)
CHLORIDE SERPL-SCNC: 110 MMOL/L (ref 98–107)
CO2 SERPL-SCNC: 22 MMOL/L (ref 22–29)
CREAT SERPL-MCNC: 0.64 MG/DL (ref 0.57–1)
CRP SERPL-MCNC: 0.27 MG/DL (ref 0–0.5)
D DIMER PPP FEU-MCNC: 0.91 MG/L (FEU) (ref 0–0.59)
D DIMER PPP FEU-MCNC: 1.02 MG/L (FEU) (ref 0–0.59)
D-LACTATE SERPL-SCNC: 2 MMOL/L (ref 0.5–2)
DEPRECATED RDW RBC AUTO: 42.4 FL (ref 37–54)
EOSINOPHIL # BLD AUTO: 0 10*3/MM3 (ref 0–0.4)
EOSINOPHIL NFR BLD AUTO: 0 % (ref 0.3–6.2)
ERYTHROCYTE [DISTWIDTH] IN BLOOD BY AUTOMATED COUNT: 13.1 % (ref 12.3–15.4)
FERRITIN SERPL-MCNC: 25.03 NG/ML (ref 13–150)
GFR SERPL CREATININE-BSD FRML MDRD: 105 ML/MIN/1.73
GLUCOSE SERPL-MCNC: 123 MG/DL (ref 65–99)
HCT VFR BLD AUTO: 31.5 % (ref 34–46.6)
HGB BLD-MCNC: 10.4 G/DL (ref 12–15.9)
L PNEUMO1 AG UR QL IA: NEGATIVE
LDH SERPL-CCNC: 129 U/L (ref 135–214)
LYMPHOCYTES # BLD AUTO: 1.7 10*3/MM3 (ref 0.7–3.1)
LYMPHOCYTES NFR BLD AUTO: 15.6 % (ref 19.6–45.3)
MAGNESIUM SERPL-MCNC: 1.7 MG/DL (ref 1.6–2.6)
MCH RBC QN AUTO: 30.3 PG (ref 26.6–33)
MCHC RBC AUTO-ENTMCNC: 32.9 G/DL (ref 31.5–35.7)
MCV RBC AUTO: 92 FL (ref 79–97)
MONOCYTES # BLD AUTO: 0.5 10*3/MM3 (ref 0.1–0.9)
MONOCYTES NFR BLD AUTO: 4.8 % (ref 5–12)
NEUTROPHILS NFR BLD AUTO: 79.5 % (ref 42.7–76)
NEUTROPHILS NFR BLD AUTO: 8.4 10*3/MM3 (ref 1.7–7)
NRBC BLD AUTO-RTO: 0 /100 WBC (ref 0–0.2)
PLATELET # BLD AUTO: 182 10*3/MM3 (ref 140–450)
PMV BLD AUTO: 8.3 FL (ref 6–12)
POTASSIUM SERPL-SCNC: 3.3 MMOL/L (ref 3.5–5.2)
POTASSIUM SERPL-SCNC: 4.5 MMOL/L (ref 3.5–5.2)
QT INTERVAL: 325 MS
RBC # BLD AUTO: 3.43 10*6/MM3 (ref 3.77–5.28)
S PNEUM AG SPEC QL LA: NEGATIVE
SODIUM SERPL-SCNC: 144 MMOL/L (ref 136–145)
TROPONIN T SERPL-MCNC: <0.01 NG/ML (ref 0–0.03)
WBC # BLD AUTO: 10.6 10*3/MM3 (ref 3.4–10.8)

## 2020-11-03 PROCEDURE — 84132 ASSAY OF SERUM POTASSIUM: CPT | Performed by: INTERNAL MEDICINE

## 2020-11-03 PROCEDURE — 82728 ASSAY OF FERRITIN: CPT | Performed by: PHYSICIAN ASSISTANT

## 2020-11-03 PROCEDURE — 86140 C-REACTIVE PROTEIN: CPT | Performed by: PHYSICIAN ASSISTANT

## 2020-11-03 PROCEDURE — 99217 PR OBSERVATION CARE DISCHARGE MANAGEMENT: CPT | Performed by: INTERNAL MEDICINE

## 2020-11-03 PROCEDURE — 83615 LACTATE (LD) (LDH) ENZYME: CPT | Performed by: PHYSICIAN ASSISTANT

## 2020-11-03 PROCEDURE — 85025 COMPLETE CBC W/AUTO DIFF WBC: CPT | Performed by: PHYSICIAN ASSISTANT

## 2020-11-03 PROCEDURE — 83735 ASSAY OF MAGNESIUM: CPT | Performed by: PHYSICIAN ASSISTANT

## 2020-11-03 PROCEDURE — 83605 ASSAY OF LACTIC ACID: CPT | Performed by: PHYSICIAN ASSISTANT

## 2020-11-03 PROCEDURE — 96375 TX/PRO/DX INJ NEW DRUG ADDON: CPT

## 2020-11-03 PROCEDURE — G0378 HOSPITAL OBSERVATION PER HR: HCPCS

## 2020-11-03 PROCEDURE — 84484 ASSAY OF TROPONIN QUANT: CPT | Performed by: PHYSICIAN ASSISTANT

## 2020-11-03 PROCEDURE — 85379 FIBRIN DEGRADATION QUANT: CPT | Performed by: PHYSICIAN ASSISTANT

## 2020-11-03 PROCEDURE — 80048 BASIC METABOLIC PNL TOTAL CA: CPT | Performed by: PHYSICIAN ASSISTANT

## 2020-11-03 RX ORDER — FAMOTIDINE 20 MG/1
20 TABLET, FILM COATED ORAL 2 TIMES DAILY
Qty: 14 TABLET | Refills: 0 | Status: SHIPPED | OUTPATIENT
Start: 2020-11-03 | End: 2020-11-10

## 2020-11-03 RX ORDER — DEXAMETHASONE SODIUM PHOSPHATE 4 MG/ML
6 INJECTION, SOLUTION INTRA-ARTICULAR; INTRALESIONAL; INTRAMUSCULAR; INTRAVENOUS; SOFT TISSUE DAILY
Status: DISCONTINUED | OUTPATIENT
Start: 2020-11-03 | End: 2020-11-03

## 2020-11-03 RX ORDER — AZITHROMYCIN 250 MG/1
250 TABLET, FILM COATED ORAL DAILY
Qty: 3 TABLET | Refills: 0 | Status: SHIPPED | OUTPATIENT
Start: 2020-11-03 | End: 2020-11-06

## 2020-11-03 RX ORDER — ASCORBIC ACID 500 MG
500 TABLET ORAL EVERY 6 HOURS SCHEDULED
Qty: 28 TABLET | Refills: 0 | Status: SHIPPED | OUTPATIENT
Start: 2020-11-04 | End: 2020-11-11

## 2020-11-03 RX ORDER — ZINC SULFATE 50(220)MG
440 CAPSULE ORAL DAILY
Status: DISCONTINUED | OUTPATIENT
Start: 2020-11-03 | End: 2020-11-03 | Stop reason: HOSPADM

## 2020-11-03 RX ORDER — CHOLECALCIFEROL (VITAMIN D3) 125 MCG
5 CAPSULE ORAL NIGHTLY
Status: DISCONTINUED | OUTPATIENT
Start: 2020-11-03 | End: 2020-11-03 | Stop reason: HOSPADM

## 2020-11-03 RX ORDER — ZINC SULFATE 50(220)MG
440 CAPSULE ORAL DAILY
Qty: 14 CAPSULE | Refills: 0 | Status: SHIPPED | OUTPATIENT
Start: 2020-11-04 | End: 2020-11-11

## 2020-11-03 RX ORDER — DEXAMETHASONE 6 MG/1
6 TABLET ORAL DAILY
Status: DISCONTINUED | OUTPATIENT
Start: 2020-11-03 | End: 2020-11-03 | Stop reason: HOSPADM

## 2020-11-03 RX ORDER — AZITHROMYCIN 250 MG/1
250 TABLET, FILM COATED ORAL EVERY 24 HOURS
Status: DISCONTINUED | OUTPATIENT
Start: 2020-11-03 | End: 2020-11-03 | Stop reason: HOSPADM

## 2020-11-03 RX ORDER — FAMOTIDINE 10 MG/ML
20 INJECTION, SOLUTION INTRAVENOUS EVERY 12 HOURS SCHEDULED
Status: DISCONTINUED | OUTPATIENT
Start: 2020-11-03 | End: 2020-11-03 | Stop reason: HOSPADM

## 2020-11-03 RX ORDER — CHOLECALCIFEROL (VITAMIN D3) 125 MCG
5 CAPSULE ORAL NIGHTLY
Qty: 7 TABLET | Refills: 0 | Status: SHIPPED | OUTPATIENT
Start: 2020-11-03 | End: 2020-11-10

## 2020-11-03 RX ORDER — DEXAMETHASONE 2 MG/1
TABLET ORAL
Qty: 9 TABLET | Refills: 0 | Status: SHIPPED | OUTPATIENT
Start: 2020-11-04 | End: 2020-11-09

## 2020-11-03 RX ORDER — ASCORBIC ACID 500 MG
500 TABLET ORAL EVERY 6 HOURS SCHEDULED
Status: DISCONTINUED | OUTPATIENT
Start: 2020-11-03 | End: 2020-11-03 | Stop reason: HOSPADM

## 2020-11-03 RX ORDER — BENZONATATE 200 MG/1
200 CAPSULE ORAL 3 TIMES DAILY PRN
Qty: 30 CAPSULE | Refills: 0 | Status: SHIPPED | OUTPATIENT
Start: 2020-11-03 | End: 2021-08-23

## 2020-11-03 RX ADMIN — OXYCODONE HYDROCHLORIDE AND ACETAMINOPHEN 500 MG: 500 TABLET ORAL at 08:46

## 2020-11-03 RX ADMIN — DEXAMETHASONE 6 MG: 6 TABLET ORAL at 08:46

## 2020-11-03 RX ADMIN — OXYCODONE HYDROCHLORIDE AND ACETAMINOPHEN 500 MG: 500 TABLET ORAL at 12:05

## 2020-11-03 RX ADMIN — IBUPROFEN 800 MG: 400 TABLET ORAL at 07:11

## 2020-11-03 RX ADMIN — POTASSIUM CHLORIDE 40 MEQ: 1500 TABLET, EXTENDED RELEASE ORAL at 08:46

## 2020-11-03 RX ADMIN — FAMOTIDINE 20 MG: 10 INJECTION, SOLUTION INTRAVENOUS at 12:05

## 2020-11-03 RX ADMIN — OXYCODONE HYDROCHLORIDE AND ACETAMINOPHEN 500 MG: 500 TABLET ORAL at 17:22

## 2020-11-03 RX ADMIN — Medication 10 ML: at 08:46

## 2020-11-03 RX ADMIN — Medication 5000 UNITS: at 08:46

## 2020-11-03 RX ADMIN — GUAIFENESIN 1200 MG: 600 TABLET, EXTENDED RELEASE ORAL at 08:46

## 2020-11-03 RX ADMIN — ZINC SULFATE 220 MG (50 MG) CAPSULE 440 MG: CAPSULE at 12:06

## 2020-11-03 RX ADMIN — POTASSIUM CHLORIDE 40 MEQ: 1500 TABLET, EXTENDED RELEASE ORAL at 13:16

## 2020-11-03 NOTE — PLAN OF CARE
Goal Outcome Evaluation:  Plan of Care Reviewed With: spouse  Progress: no change  Patient is alert and able to make needs known to staff. Patient has no complaints of pain or discomfort voiced at this time. Patient continues to have only need room air at this time. Will continue to monitor.   Problem: Adult Inpatient Plan of Care  Goal: Plan of Care Review  Outcome: Ongoing, Progressing

## 2020-11-03 NOTE — PROGRESS NOTES
Discharge Planning Assessment  [facility]     Patient Name: Renae Tang  MRN: [id number]  Today's Date: 11/3/2020    Admit Date: 11/2/2020    Discharge Needs Assessment     Row Name 11/03/20 1429       Living Environment    Lives With  child(gisel), dependent;spouse    Unique Family Situation  Pt is independent at home.    Current Living Arrangements  home/apartment/condo    Primary Care Provided by  self    Provides Primary Care For  child(gisel)    Family Caregiver if Needed  spouse    Able to Return to Prior Arrangements  yes       Resource/Environmental Concerns    Resource/Environmental Concerns  none    Transportation Concerns  car, none       Transition Planning    Patient/Family Anticipates Transition to  home with family    Patient/Family Anticipated Services at Transition  none    Transportation Anticipated  family or friend will provide       Discharge Needs Assessment    Readmission Within the Last 30 Days  no previous admission in last 30 days    Equipment Currently Used at Home  none    Concerns to be Addressed  denies needs/concerns at this time    Equipment Needed After Discharge  none    Discharge Coordination/Progress  DC Plan: Home with family, current on room air.        Discharge Plan     Row Name 11/03/20 1431       Plan    Plan  DC Plan: Home with family, current on room air.        Continued Care and Services - Admitted Since 11/2/2020    Coordination has not been started for this encounter.         Demographic Summary     Row Name 11/03/20 1428       General Information    Admission Type  observation    Arrived From  emergency department    Referral Source  admission list    Reason for Consult  discharge planning    Preferred Language  English     Used During This Interaction  no    General Information Comments  Discussed with pt per phone due to COVID restrictions.                     Kacy Lima RN

## 2020-11-03 NOTE — PLAN OF CARE
Goal Outcome Evaluation:  Plan of Care Reviewed With: spouse  Progress: no change   Patient alert and oriented, sats @ 97% on room air, lactate 5.2 and d dimer elevated, Lokesh STRICKLAND notified, no new ordered given, will continue to monitor.  Patient has rested well this shift, will continue to monitor.

## 2020-11-03 NOTE — H&P
[facility] Medicine [facility]      Patient Name: Renae Tang  : 1985  MRN: [id number]  Primary Care Physician: Alysha Melgar APRN  Date of admission: 2020    Patient Care Team:  Alysha Melgar APRN as PCP - General  Alysha Melgar APRN as PCP - Family Medicine          Subjective   History Present Illness     Chief Complaint:   Chief Complaint   Patient presents with   • Shortness of Breath         Ms. Tang is a 34 y.o. female presents to [facility] ER on 2020 complaining of shortness of breath, cough and congestion for the past week.  Patient states that her symptoms progressively got worse.  Patient states that her cough is productive with green sputum.  Patient states that she had tested positive for COVID-19 about 6 days ago.  Patient states her shortness of breath is worse with exertion and better with rest.  Patient also reports that she had fever at home today when she took ibuprofen for.  Patient also reports some chest pain that started yesterday which she describes as a sharp pain in the center of her chest that comes and goes.  Patient states it is nonradiating and nothing seems to make it better or worse.  Patient also reports some diarrhea but otherwise denies abdominal pain, nausea, vomiting, urinary symptoms or swelling in her legs bilaterally.  Of note, it is reported that patient was seen at [facility] 2 days prior in which she had an elevated dimer at that time and had a CT PE protocol done that was negative for PE.  Patient was sent home at that time.    In the ED, ABG showed pH of 7.37, PCO2 low at 33.5, PO2 normal, bicarb low at 19.4.  LDH normal, potassium of 3.4, pro-Blaze negative, CRP normal, ferritin normal.  CBC shows white blood cell count of 8.0 with 3% bands otherwise largely unremarkable.  UA shows 1+ blood otherwise largely unremarkable.  UDS unremarkable.  EKG shows sinus tachycardia 130 bpm,  no ST elevation apparent.  Respiratory viral panel COVID-19 swab positive for COVID-19.  Patient is afebrile, pulse 120, on room air oxygen and 99% SPO2 and blood pressure normotensive.  Patient given 1 L normal saline bolus.       Review of Systems   Constitution: Positive for chills and fever.   HENT: Negative.    Cardiovascular: Positive for chest pain and dyspnea on exertion. Negative for leg swelling, near-syncope and palpitations.   Respiratory: Positive for cough, shortness of breath and sputum production (green).    Skin: Negative.    Musculoskeletal: Negative.    Gastrointestinal: Positive for diarrhea (x2 days). Negative for abdominal pain, nausea and vomiting.   Genitourinary: Negative.  Negative for dysuria.   Neurological: Negative.    Psychiatric/Behavioral: Negative.            Personal History     Past Medical History:   Past Medical History:   Diagnosis Date   • Anxiety    • Back pain        Surgical History:      Past Surgical History:   Procedure Laterality Date   •  SECTION  2016 and 2018   • RADIOFREQUENCY ABLATION  2019   • TONSILLECTOMY  2018   • TONSILLECTOMY             Family History: family history includes Cancer in her maternal grandmother; Heart disease in her paternal grandmother; Hypertension in her paternal grandmother. Otherwise pertinent FHx was reviewed and unremarkable.     Social History:  reports that she has never smoked. She has never used smokeless tobacco. She reports that she does not drink alcohol or use drugs.      Medications:  Prior to Admission medications    Medication Sig Start Date End Date Taking? Authorizing Provider   amitriptyline (ELAVIL) 150 MG tablet Take 150 mg by mouth Every Night.   Yes Damari Sifuentes MD   LORazepam (ATIVAN) 1 MG tablet Take 1 mg by mouth Every 12 (Twelve) Hours As Needed for Anxiety.   Yes Damari Sifuentes MD   DULoxetine (CYMBALTA) 30 MG capsule Take 30 mg by mouth Daily. 19   Damari Sifuentes MD    gabapentin (NEURONTIN) 300 MG capsule 1 capsule nightly for 1 week then increase to every 12 hours 10/15/19   Rashad Bone PA   ibuprofen (ADVIL,MOTRIN) 800 MG tablet Take 800 mg by mouth Every 8 (Eight) Hours As Needed for Mild Pain . 10/7/19   Damari Sifuentes MD   Levonorgest-Eth Estrad 91-Day 0.15-0.03 &0.01 MG tablet Take 1 tablet by mouth Daily. 7/23/19   Damari Sifuentes MD       Allergies:    Allergies   Allergen Reactions   • Paxil [Paroxetine] Unknown - Low Severity     EPS       Objective   Objective     Vital Signs  Temp:  [97.1 °F (36.2 °C)-99.3 °F (37.4 °C)] 97.1 °F (36.2 °C)  Heart Rate:  [109-135] 109  Resp:  [19-20] 19  BP: (109-135)/(44-76) 109/55  SpO2:  [96 %-100 %] 98 %  on   ;   Device (Oxygen Therapy): room air  Body mass index is 29.5 kg/m².    Physical Exam  Vitals signs and nursing note reviewed.   Constitutional:       General: She is not in acute distress.     Appearance: She is well-developed. She is ill-appearing. She is not diaphoretic.   HENT:      Head: Normocephalic and atraumatic.      Nose: Nose normal.      Mouth/Throat:      Pharynx: No oropharyngeal exudate.   Eyes:      Extraocular Movements: Extraocular movements intact.      Conjunctiva/sclera: Conjunctivae normal.      Pupils: Pupils are equal, round, and reactive to light.   Neck:      Musculoskeletal: Normal range of motion.   Cardiovascular:      Rate and Rhythm: Normal rate and regular rhythm.      Heart sounds: Normal heart sounds.      Comments: S1, S2 audible.  Pulmonary:      Effort: Pulmonary effort is normal. No respiratory distress.      Breath sounds: Normal breath sounds. No wheezing, rhonchi or rales.      Comments: On room air.  Diminished breath sounds bilaterally.  Abdominal:      General: Bowel sounds are normal. There is no distension.      Palpations: Abdomen is soft.      Tenderness: There is no abdominal tenderness. There is no guarding or rebound.   Musculoskeletal: Normal range of  motion.         General: No tenderness or deformity.   Skin:     General: Skin is warm.      Findings: No erythema or rash.   Neurological:      Mental Status: She is alert and oriented to person, place, and time.      Cranial Nerves: No cranial nerve deficit.   Psychiatric:         Mood and Affect: Mood normal.         Behavior: Behavior normal.         Results Review:  I have personally reviewed most recent cardiac tracings, lab results and radiology images and interpretations and agree with findings.    Results from last 7 days   Lab Units 11/02/20  1717   WBC 10*3/mm3 8.00   HEMOGLOBIN g/dL 12.2   HEMATOCRIT % 36.9   PLATELETS 10*3/mm3 207     Results from last 7 days   Lab Units 11/02/20  2303  11/02/20 1717   SODIUM mmol/L  --   --  141   POTASSIUM mmol/L  --   --  3.4*   CHLORIDE mmol/L  --   --  103   CO2 mmol/L  --   --  19.0*   BUN mg/dL  --   --  10   CREATININE mg/dL  --   --  0.89   GLUCOSE mg/dL  --   --  167*   CALCIUM mg/dL  --   --  8.9   ALT (SGPT) U/L  --   --  15   AST (SGOT) U/L  --   --  15   TROPONIN T ng/mL <0.010  --  <0.010   LACTATE mmol/L 5.2*   < >  --    PROCALCITONIN ng/mL  --   --  0.02    < > = values in this interval not displayed.     Estimated Creatinine Clearance: 94.8 mL/min (by C-G formula based on SCr of 0.89 mg/dL).  Brief Urine Lab Results  (Last result in the past 365 days)      Color   Clarity   Blood   Leuk Est   Nitrite   Protein   CREAT   Urine HCG        11/02/20 1717 Yellow Clear Small (1+) Negative Negative Negative               Microbiology Results (last 10 days)     Procedure Component Value - Date/Time    Legionella Antigen, Urine - Urine, Urine, Clean Catch [[id number]]  (Normal) Collected: 11/02/20 2317    Lab Status: Final result Specimen: Urine, Clean Catch Updated: 11/03/20 0016     LEGIONELLA ANTIGEN, URINE Negative    S. Pneumo Ag Urine or CSF - Urine, Urine, Clean Catch [[id number]]  (Normal) Collected: 11/02/20 2317    Lab Status: Final result Specimen:  Urine, Clean Catch Updated: 11/03/20 0016     Strep Pneumo Ag Negative    Respiratory Panel PCR w/COVID-19(SARS-CoV-2) RACHEL/TYRONE/SHANITA/PAD/COR/MAD/MARISELA In-House, NP Swab in UTM/VTM, 3-4 HR TAT - Swab, Nasopharynx [[id number]]  (Abnormal) Collected: 11/02/20 1651    Lab Status: Final result Specimen: Swab from Nasopharynx Updated: 11/02/20 1800     ADENOVIRUS, PCR Not Detected     Coronavirus 229E Not Detected     Coronavirus HKU1 Not Detected     Coronavirus NL63 Not Detected     Coronavirus OC43 Not Detected     COVID19 Detected     Human Metapneumovirus Not Detected     Human Rhinovirus/Enterovirus Not Detected     Influenza A PCR Not Detected     Influenza A H1 Not Detected     Influenza A H1 2009 PCR Not Detected     Influenza A H3 Not Detected     Influenza B PCR Not Detected     Parainfluenza Virus 1 Not Detected     Parainfluenza Virus 2 Not Detected     Parainfluenza Virus 3 Not Detected     Parainfluenza Virus 4 Not Detected     RSV, PCR Not Detected     Bordetella pertussis pcr Not Detected     Bordetella parapertussis PCR Not Detected     Chlamydophila pneumoniae PCR Not Detected     Mycoplasma pneumo by PCR Not Detected    Narrative:      Fact sheet for providers: https://docs.[employer]/wp-content/uploads/RCQ4552-6944-TJ2.1-EUA-Provider-Fact-Sheet-3.pdf    Fact sheet for patients: https://docs.[employer]/wp-content/uploads/GVI1720-2112-QI4.1-EUA-Patient-Fact-Sheet-1.pdf          ECG/EMG Results (most recent)     Procedure Component Value Units Date/Time    ECG 12 Lead [[id number]] Collected: 11/02/20 1652     Updated: 11/02/20 1654     QT Interval 325 ms     Narrative:      HEART RATE= 130  bpm  RR Interval= 460  ms  ND Interval= 157  ms  P Horizontal Axis= 19  deg  P Front Axis= 76  deg  QRSD Interval= 94  ms  QT Interval= 325  ms  QRS Axis= 72  deg  T Wave Axis= -43  deg  - ABNORMAL ECG -  Sinus tachycardia  Inferior infarct, age indeterminate  Anterolateral Q wave, probably normal for age  No previous  ECG available for comparison  Electronically Signed By:   Date and Time of Study: 2020-11-02 16:52:26    ECG 12 Lead [[id number]] Resulted: 11/03/20 0048     Updated: 11/03/20 0048                    Xr Chest 1 View    Result Date: 11/2/2020   1. No acute cardiopulmonary disease.   Electronically Signed By-Soy Batres On:11/2/2020 8:43 PM This report was finalized on [id number] by  Soy Batres, .        Estimated Creatinine Clearance: 94.8 mL/min (by C-G formula based on SCr of 0.89 mg/dL).    Assessment/Plan   Assessment/Plan       Active Hospital Problems    Diagnosis  POA   • **COVID-19 virus infection [U07.1]  Unknown   • Anxiety disorder [F41.9]  Yes   • Depression [F32.9]  Yes   • Hypokalemia [E87.6]  Yes      Resolved Hospital Problems   No resolved problems to display.       COVID-19 infection triggering sepsis  -Patient tachycardic, febrile, and elevated lactic, however patient was not hypotensive in the ED  -ABG showed pH of 7.37, PCO2 low at 33.5, PO2 normal, bicarb low at 19.4.    -LDH normal,  pro-Blaze negative, CRP normal, ferritin normal.    -CBC shows white blood cell count of 8.0 with 3% bands otherwise largely unremarkable.    -UA shows 1+ blood otherwise largely unremarkable.  UDS unremarkable.    -EKG shows sinus tachycardia 130 bpm, no ST elevation apparent.    -Respiratory viral panel COVID-19 swab positive for COVID-19.    -Patient had CT PE protocol which was negative for PE due to elevated D-dimer at [facility] 2 days ago  -Patient is afebrile, pulse 120, on room air oxygen and 99% SPO2 and blood pressure normotensive.    -Patient given 1 L normal saline bolus.  Additional bolus given in ED to complete 30 mL/kg sepsis bolus.  -Start azithromycin, Rocephin  -Check blood cultures, sputum culture, urine antigens  -Start Decadron  -Famotidine, vitamin C and vitamin D ordered  -Pharmacy to dose Lovenox per COVID-19 protocol  -Check daily LDH, ferritin, CRP, D-dimer, serial  troponins, repeat lactic, hCG, chest x-ray  -Give 500 normal saline bolus  -Mucinex and Tessalon Perles ordered  -Regular diet ordered    Hypokalemia  -potassium of 3.4, replacement protocol ordered    Anxiety/depression  -Continue home amitriptyline, Ativan        VTE Prophylaxis -pharmacy to dose Lovenox per COVID-19 protocol  Mechanical Order History:     None      Pharmalogical Order History:     None          CODE STATUS:    Code Status and Medical Interventions:   Ordered at: 11/02/20 0364     Code Status:    CPR     Medical Interventions (Level of Support Prior to Arrest):    Full       This patient has been examined wearing appropriate Personal Protective Equipment and discussed with hospital infection control department. 11/03/20      I discussed the patient's findings and my recommendations with patient.      Signature:Electronically signed by MARCO A Liu, 11/03/20, 4:13 AM EST.      [facility] Hospitalist Team

## 2020-11-03 NOTE — ATTESTATION SEPSIS FOCUSED EXAM
SEPTIC SHOCK FOCUSED EXAM ATTESTATION    I attest that I have reassessed tissue perfusion after the fluid bolus given.    MARCO A Liu  11/03/20  02:43 EST     Electronically signed by MARCO A Liu, 11/03/20, 2:43 AM EST.

## 2020-11-03 NOTE — ED NOTES
Attempted to call report to 207/2A, no answer, will call again        Natalie Thorpe, RN  11/02/20 2013

## 2020-11-04 ENCOUNTER — READMISSION MANAGEMENT (OUTPATIENT)
Dept: CALL CENTER | Facility: HOSPITAL | Age: 35
End: 2020-11-04

## 2020-11-04 NOTE — OUTREACH NOTE
COVID-19 Week 1 Survey      Responses   [facility] patient discharged from?  [facility]   Does the patient have one of the following disease processes/diagnoses(primary or secondary)?  COVID-19   COVID-19 underlying condition?  None   Call Number  Call 1   Week 1 Call successful?  No   Discharge diagnosis  COVID-19 virus infection           Sarah Johnson LPN

## 2020-11-04 NOTE — DISCHARGE SUMMARY
[facility] Medicine Services  DISCHARGE SUMMARY        Prepared For PCP:  Alysha Melgar APRN    Patient Name: Renae Tang  : 1985  MRN: [id number]      Date of Admission:   2020    Date of Discharge:  11/3/2020    Length of stay:  LOS: 0 days     Hospital Course     Presenting Problem:   Tachycardia [R00.0]  Dyspnea, unspecified type [R06.00]  Chest pain, unspecified type [R07.9]  Clinical diagnosis of COVID-19 [U07.1]      Active Hospital Problems    Diagnosis  POA   • **COVID-19 virus infection [U07.1]  Yes   • Anxiety disorder [F41.9]  Yes   • Depression [F32.9]  Yes      Resolved Hospital Problems    Diagnosis Date Resolved POA   • Hypokalemia [E87.6] 2020 Yes           Hospital Course:  Renae Tang is a 34 y.o. female presents to [facility] on 2020 complaining of shortness of breath, cough and congestion for the past week.  Patient states that her symptoms progressively got worse.  Patient states that her cough is productive with green sputum.  Patient states that she had tested positive for COVID-19 about 6 days ago.  Patient states her shortness of breath is worse with exertion and better with rest.  Patient also reports that she had fever at home today when she took ibuprofen for.  Patient also reports some chest pain that started yesterday which she describes as a sharp pain in the center of her chest that comes and goes.  Patient states it is nonradiating and nothing seems to make it better or worse.  Patient also reports some diarrhea but otherwise denies abdominal pain, nausea, vomiting, urinary symptoms or swelling in her legs bilaterally.  Of note, it is reported that patient [place] [facility] in which she had an elevated dimer at that time and had a CT PE protocol done that was negative for PE.  Patient was sent home at that time.     In the ED, ABG showed pH of 7.37, PCO2 low at 33.5, PO2  normal, bicarb low at 19.4.  LDH normal, potassium of 3.4, pro-Blaze negative, CRP normal, ferritin normal.  CBC shows white blood cell count of 8.0 with 3% bands otherwise largely unremarkable.  UA shows 1+ blood otherwise largely unremarkable.  UDS unremarkable.  EKG shows sinus tachycardia 130 bpm, no ST elevation apparent.  Respiratory viral panel COVID-19 swab positive for COVID-19.  Patient is afebrile, pulse 120, on room air oxygen and 99% SPO2 and blood pressure normotensive.  Patient given 1 L normal saline bolus.    Patient was given supportive care and started on multiple vitamins and supplements to support her immune system.  She did not qualify for treatment with Remdesivir.  Based on her history, she is at least 1 week or more into her illness, and she has had resolution of her symptoms, both GI and respiratory.  Preliminary sputum culture grew intracellular gram-negative cocci which were covered empirically by Rocephin and azithromycin that were given on admission.  The patient was given additional doses of azithromycin to complete out a full course of treatment.  I informed the patient that should cultures dictate a need for an alternative course of antibiotics that I would contact her and call in the necessary medications.    She is discharged home in good condition with instructions to continue self quarantine for at least another 5 to 7 days.  She is also been instructed to continue the multiple vitamins and minerals to support her immune system from inflammatory process of the viral infection.      Day of Discharge     HPI: Patient denies any further fevers, shortness of breath, or chest pain.      Vital Signs:   Temp:  [97.1 °F (36.2 °C)-98.1 °F (36.7 °C)] 97.7 °F (36.5 °C)  Heart Rate:  [] 105  Resp:  [15-29] 17  BP: (109-128)/(47-78) 124/78     Physical Exam:  General: well-developed and well-nourished, NAD  HEENT: NC/AT, EOMI, PERRLA  Heart: RRR. No murmur   Chest: CTAB, no w/r/r, normal  respiratory effort  Abdominal: Soft. NT/ND. Bowel sounds present  Musculoskeletal: Normal ROM.  No edema. No calf tenderness.  Neurological: AAOx3, no focal deficits  Skin: Skin is warm and dry. No rash  Psychiatric: Normal mood and affect.    Pertinent  and/or Most Recent Results     Results from last 7 days   Lab Units 11/03/20  0619 11/02/20  1717   WBC 10*3/mm3 10.60 8.00   HEMOGLOBIN g/dL 10.4* 12.2   HEMATOCRIT % 31.5* 36.9   PLATELETS 10*3/mm3 182 207   SODIUM mmol/L 144 141   POTASSIUM mmol/L 3.3* 3.4*   CHLORIDE mmol/L 110* 103   CO2 mmol/L 22.0 19.0*   BUN mg/dL 9 10   CREATININE mg/dL 0.64 0.89   GLUCOSE mg/dL 123* 167*   CALCIUM mg/dL 7.6* 8.9     Results from last 7 days   Lab Units 11/02/20  1717   BILIRUBIN mg/dL <0.2   ALK PHOS U/L 64   ALT (SGPT) U/L 15   AST (SGOT) U/L 15           Invalid input(s): TG, LDLCALC, LDLREALC  Results from last 7 days   Lab Units 11/03/20  0619 11/02/20 2303 11/02/20 2006 11/02/20  1717   TROPONIN T ng/mL <0.010 <0.010  --   --  <0.010   PROCALCITONIN ng/mL  --   --   --   --  0.02   LACTATE mmol/L 2.0 5.2* 4.5*   < >  --     < > = values in this interval not displayed.       Brief Urine Lab Results  (Last result in the past 365 days)      Color   Clarity   Blood   Leuk Est   Nitrite   Protein   CREAT   Urine HCG        11/02/20 1717 Yellow Clear Small (1+) Negative Negative Negative               Microbiology Results Abnormal     Procedure Component Value - Date/Time    Blood Culture - Blood, Hand, Left [[id number]] Collected: 11/02/20 1929    Lab Status: Preliminary result Specimen: Blood from Hand, Left Updated: 11/03/20 1945     Blood Culture No growth at 24 hours    Blood Culture - Blood, Arm, Right [[id number]] Collected: 11/02/20 1929    Lab Status: Preliminary result Specimen: Blood from Arm, Right Updated: 11/03/20 1945     Blood Culture No growth at 24 hours    Respiratory Culture - Sputum, Oropharynx [[id number]] Collected: 11/02/20 0673    Lab Status:  Preliminary result Specimen: Sputum from Oropharynx Updated: 11/03/20 0659     Gram Stain Many (4+) WBCs per low power field      Many (4+) Gram negative cocci, intracellular    S. Pneumo Ag Urine or CSF - Urine, Urine, Clean Catch [[id number]]  (Normal) Collected: 11/02/20 2317    Lab Status: Final result Specimen: Urine, Clean Catch Updated: 11/03/20 0016     Strep Pneumo Ag Negative    Legionella Antigen, Urine - Urine, Urine, Clean Catch [[id number]]  (Normal) Collected: 11/02/20 2317    Lab Status: Final result Specimen: Urine, Clean Catch Updated: 11/03/20 0016     LEGIONELLA ANTIGEN, URINE Negative    Respiratory Panel PCR w/COVID-19(SARS-CoV-2) [facility]/SHANITA/PAD/COR/MAD/MARISELA In-House, NP Swab in UTM/VTM, 3-4 HR TAT - Swab, Nasopharynx [[id number]]  (Abnormal) Collected: 11/02/20 1651    Lab Status: Final result Specimen: Swab from Nasopharynx Updated: 11/02/20 1800     ADENOVIRUS, PCR Not Detected     Coronavirus 229E Not Detected     Coronavirus HKU1 Not Detected     Coronavirus NL63 Not Detected     Coronavirus OC43 Not Detected     COVID19 Detected     Human Metapneumovirus Not Detected     Human Rhinovirus/Enterovirus Not Detected     Influenza A PCR Not Detected     Influenza A H1 Not Detected     Influenza A H1 2009 PCR Not Detected     Influenza A H3 Not Detected     Influenza B PCR Not Detected     Parainfluenza Virus 1 Not Detected     Parainfluenza Virus 2 Not Detected     Parainfluenza Virus 3 Not Detected     Parainfluenza Virus 4 Not Detected     RSV, PCR Not Detected     Bordetella pertussis pcr Not Detected     Bordetella parapertussis PCR Not Detected     Chlamydophila pneumoniae PCR Not Detected     Mycoplasma pneumo by PCR Not Detected    Narrative:      Fact sheet for providers: https://docs.[employer]/wp-content/uploads/NNL0957-4039-WL8.1-EUA-Provider-Fact-Sheet-3.pdf    Fact sheet for patients: https://docs.[employer]/wp-content/uploads/SBO1289-5397-AP2.1-EUA-Patient-Fact-Sheet-1.pdf           Xr Chest 1 View    Result Date: 11/2/2020  Impression:  1. No acute cardiopulmonary disease.   Electronically Signed By-Soy Batres On:11/2/2020 8:43 PM This report was finalized on [id number] by  Soy Batres, .      Test Results Pending at Discharge  Pending Labs     Order Current Status    Blood Culture - Blood, Arm, Right Preliminary result    Blood Culture - Blood, Hand, Left Preliminary result    Respiratory Culture - Sputum, Oropharynx Preliminary result          Procedures Performed      Consults:   Consults     Date and Time Order Name Status Description    11/2/2020 1918 Hospitalist (on-call MD unless specified) Completed             Discharge Details        Discharge Medications      New Medications      Instructions Start Date   ascorbic acid 500 MG tablet  Commonly known as: VITAMIN C   500 mg, Oral, Every 6 Hours Scheduled   Start Date: November 4, 2020     azithromycin 250 MG tablet  Commonly known as: ZITHROMAX   250 mg, Oral, Daily      benzonatate 200 MG capsule  Commonly known as: TESSALON   200 mg, Oral, 3 Times Daily PRN      dexamethasone 2 MG tablet  Commonly known as: DECADRON   Take 3 tablets by mouth Daily for 1 day, THEN 2 tablets Daily for 2 days, THEN 1 tablet Daily for 2 days.   Start Date: November 4, 2020     famotidine 20 MG tablet  Commonly known as: PEPCID   20 mg, Oral, 2 Times Daily      melatonin 5 MG tablet tablet   5 mg, Oral, Nightly      vitamin D3 125 MCG (5000 UT) capsule capsule   5,000 Units, Oral, Daily   Start Date: November 4, 2020     zinc sulfate 220 (50 Zn) MG capsule  Commonly known as: ZINCATE   440 mg, Oral, Daily   Start Date: November 4, 2020        Continue These Medications      Instructions Start Date   amitriptyline 150 MG tablet  Commonly known as: ELAVIL   150 mg, Oral, Nightly      ibuprofen 800 MG tablet  Commonly known as: ADVIL,MOTRIN   800 mg, Oral, Every 8 Hours PRN      LORazepam 1 MG tablet  Commonly known as: ATIVAN   1 mg, Oral,  Every 12 Hours PRN      phentermine 37.5 MG tablet  Commonly known as: ADIPEX-P   37.5 mg, Oral, Every Morning Before Breakfast             Allergies   Allergen Reactions   • Paxil [Paroxetine] Unknown - Low Severity     EPS         Discharge Disposition:  Home or Self Care    Diet:  Hospital:  Diet Order   Procedures   • Diet Regular         Discharge Activity:   Activity Instructions     Activity as Tolerated              CODE STATUS:    Code Status and Medical Interventions:   Ordered at: 11/02/20 5938     Code Status:    CPR     Medical Interventions (Level of Support Prior to Arrest):    Full         Follow-up Appointments  No future appointments.    Additional Instructions for the Follow-ups that You Need to Schedule     Call MD With Problems / Concerns   As directed      Instructions: Call [phone] or email [email or web address] for problems or concerns.    Order Comments: Instructions: Call [phone] or email [email or web address] for problems or concerns.          Discharge Follow-up with PCP   As directed       Currently Documented PCP:    Alysha Melgar APRN    PCP Phone Number:    [phone]     Follow Up Details: 7-10 days                 Condition on Discharge:      Stable      This patient has been examined wearing appropriate Personal Protective Equipment. 11/03/20      Electronically signed by Dannielle Cheema MD, 11/03/20, 8:00 PM EST.      Time: I spent  35  minutes on this discharge activity which included face-to-face encounter with the patient/reviewing the data in the system/coordination of the care with the nursing staff as well as consultants/documentation/entering orders.

## 2020-11-04 NOTE — OUTREACH NOTE
Prep Survey      Responses   [facility] facility patient discharged from?  [facility]   Is LACE score < 7 ?  Yes   Eligibility  Readm Mgmt   Discharge diagnosis  COVID-19 virus infection    Does the patient have one of the following disease processes/diagnoses(primary or secondary)?  COVID-19   Does the patient have Home health ordered?  No   Is there a DME ordered?  No   Prep survey completed?  Yes          Susana Babin RN

## 2020-11-05 ENCOUNTER — READMISSION MANAGEMENT (OUTPATIENT)
Dept: CALL CENTER | Facility: HOSPITAL | Age: 35
End: 2020-11-05

## 2020-11-05 LAB
BACTERIA SPEC RESP CULT: ABNORMAL
BACTERIA SPEC RESP CULT: ABNORMAL
GRAM STN SPEC: ABNORMAL
GRAM STN SPEC: ABNORMAL

## 2020-11-05 NOTE — OUTREACH NOTE
COVID-19 Week 1 Survey      Responses   [facility] patient discharged from?  [facility]   Does the patient have one of the following disease processes/diagnoses(primary or secondary)?  COVID-19   COVID-19 underlying condition?  None   Call Number  Call 2   Week 1 Call successful?  No   Discharge diagnosis  COVID-19 virus infection           Beatrice Crystal RN

## 2020-11-06 ENCOUNTER — READMISSION MANAGEMENT (OUTPATIENT)
Dept: CALL CENTER | Facility: HOSPITAL | Age: 35
End: 2020-11-06

## 2020-11-06 NOTE — OUTREACH NOTE
COVID-19 Week 1 Survey      Responses   [facility] patient discharged from?  [facility]   Does the patient have one of the following disease processes/diagnoses(primary or secondary)?  COVID-19   COVID-19 underlying condition?  None   Call Number  Call 3   Week 1 Call successful?  No          Alexandra Tidwell RN

## 2020-11-07 LAB
BACTERIA SPEC AEROBE CULT: NORMAL
BACTERIA SPEC AEROBE CULT: NORMAL

## 2020-11-09 ENCOUNTER — READMISSION MANAGEMENT (OUTPATIENT)
Dept: CALL CENTER | Facility: HOSPITAL | Age: 35
End: 2020-11-09

## 2020-11-09 NOTE — OUTREACH NOTE
COVID-19 Week 1 Survey      Responses   [facility] patient discharged from?  [facility]   Does the patient have one of the following disease processes/diagnoses(primary or secondary)?  COVID-19   COVID-19 underlying condition?  None   Call Number  Call 4   Week 1 Call successful?  No   Discharge diagnosis  COVID-19 virus infection           Yadi Kennedy RN

## 2020-11-12 ENCOUNTER — READMISSION MANAGEMENT (OUTPATIENT)
Dept: CALL CENTER | Facility: HOSPITAL | Age: 35
End: 2020-11-12

## 2020-11-12 NOTE — OUTREACH NOTE
COVID-19 Week 2 Survey      Responses   [facility] patient discharged from?  [facility]   Does the patient have one of the following disease processes/diagnoses(primary or secondary)?  COVID-19   COVID-19 underlying condition?  None   Call Number  Call 1   COVID-19 Week 2: Call 1 attempt successful?  No   Discharge diagnosis  COVID-19 virus infection           Beatrice Crystal RN

## 2020-11-17 ENCOUNTER — READMISSION MANAGEMENT (OUTPATIENT)
Dept: CALL CENTER | Facility: HOSPITAL | Age: 35
End: 2020-11-17

## 2020-11-17 NOTE — OUTREACH NOTE
COVID-19 Week 3 Survey      Responses   [facility] [place] patient discharged from?  [facility]   Does the patient have one of the following disease processes/diagnoses(primary or secondary)?  COVID-19   COVID-19 underlying condition?  None   Call Number  Call 1   COVID-19 Week 3: Call 1 attempt successful?  No   Discharge diagnosis  COVID-19 virus infection           Laura Wayne RN

## 2021-04-05 ENCOUNTER — HOSPITAL ENCOUNTER (EMERGENCY)
Facility: HOSPITAL | Age: 36
Discharge: HOME | End: 2021-04-05
Attending: EMERGENCY MEDICINE | Admitting: EMERGENCY MEDICINE

## 2021-04-05 ENCOUNTER — APPOINTMENT (OUTPATIENT)
Dept: ULTRASOUND IMAGING | Facility: HOSPITAL | Age: 36
End: 2021-04-05

## 2021-04-05 VITALS
OXYGEN SATURATION: 98 % | BODY MASS INDEX: 30.33 KG/M2 | HEART RATE: 78 BPM | WEIGHT: 188.71 LBS | RESPIRATION RATE: 16 BRPM | HEIGHT: 66 IN | SYSTOLIC BLOOD PRESSURE: 110 MMHG | DIASTOLIC BLOOD PRESSURE: 81 MMHG | TEMPERATURE: 98 F

## 2021-04-05 DIAGNOSIS — O20.0 THREATENED ABORTION: Primary | ICD-10-CM

## 2021-04-05 LAB
BACTERIA UR QL AUTO: ABNORMAL /HPF
BASOPHILS # BLD AUTO: 0.1 10*3/MM3 (ref 0–0.2)
BASOPHILS NFR BLD AUTO: 0.6 % (ref 0–1.5)
BILIRUB UR QL STRIP: NEGATIVE
CLARITY UR: CLEAR
COLOR UR: YELLOW
DEPRECATED RDW RBC AUTO: 44.2 FL (ref 37–54)
EOSINOPHIL # BLD AUTO: 0.1 10*3/MM3 (ref 0–0.4)
EOSINOPHIL NFR BLD AUTO: 1.4 % (ref 0.3–6.2)
ERYTHROCYTE [DISTWIDTH] IN BLOOD BY AUTOMATED COUNT: 13.7 % (ref 12.3–15.4)
GLUCOSE UR STRIP-MCNC: NEGATIVE MG/DL
HCG INTACT+B SERPL-ACNC: 8.34 MIU/ML
HCT VFR BLD AUTO: 36.6 % (ref 34–46.6)
HGB BLD-MCNC: 12.2 G/DL (ref 12–15.9)
HGB UR QL STRIP.AUTO: ABNORMAL
HOLD SPECIMEN: NORMAL
HOLD SPECIMEN: NORMAL
HYALINE CASTS UR QL AUTO: ABNORMAL /LPF
HYPOCHROMIA BLD QL: NORMAL
KETONES UR QL STRIP: NEGATIVE
LARGE PLATELETS: NORMAL
LEUKOCYTE ESTERASE UR QL STRIP.AUTO: NEGATIVE
LYMPHOCYTES # BLD AUTO: 2.2 10*3/MM3 (ref 0.7–3.1)
LYMPHOCYTES NFR BLD AUTO: 23.7 % (ref 19.6–45.3)
MCH RBC QN AUTO: 30.8 PG (ref 26.6–33)
MCHC RBC AUTO-ENTMCNC: 33.4 G/DL (ref 31.5–35.7)
MCV RBC AUTO: 92.1 FL (ref 79–97)
MONOCYTES # BLD AUTO: 0.3 10*3/MM3 (ref 0.1–0.9)
MONOCYTES NFR BLD AUTO: 3.6 % (ref 5–12)
NEUTROPHILS NFR BLD AUTO: 6.6 10*3/MM3 (ref 1.7–7)
NEUTROPHILS NFR BLD AUTO: 70.7 % (ref 42.7–76)
NITRITE UR QL STRIP: NEGATIVE
NRBC BLD AUTO-RTO: 0.2 /100 WBC (ref 0–0.2)
PH UR STRIP.AUTO: 6 [PH] (ref 5–8)
PLATELET # BLD AUTO: 345 10*3/MM3 (ref 140–450)
PMV BLD AUTO: 9.5 FL (ref 6–12)
PROT UR QL STRIP: NEGATIVE
RBC # BLD AUTO: 3.97 10*6/MM3 (ref 3.77–5.28)
RBC # UR: ABNORMAL /HPF
REF LAB TEST METHOD: ABNORMAL
SMALL PLATELETS BLD QL SMEAR: ADEQUATE
SP GR UR STRIP: 1.02 (ref 1–1.03)
SQUAMOUS #/AREA URNS HPF: ABNORMAL /HPF
UROBILINOGEN UR QL STRIP: ABNORMAL
WBC # BLD AUTO: 9.3 10*3/MM3 (ref 3.4–10.8)
WBC MORPH BLD: NORMAL
WBC UR QL AUTO: ABNORMAL /HPF
WHOLE BLOOD HOLD SPECIMEN: NORMAL

## 2021-04-05 PROCEDURE — 99283 EMERGENCY DEPT VISIT LOW MDM: CPT

## 2021-04-05 PROCEDURE — 85007 BL SMEAR W/DIFF WBC COUNT: CPT | Performed by: EMERGENCY MEDICINE

## 2021-04-05 PROCEDURE — 81001 URINALYSIS AUTO W/SCOPE: CPT | Performed by: EMERGENCY MEDICINE

## 2021-04-05 PROCEDURE — 76815 OB US LIMITED FETUS(S): CPT

## 2021-04-05 PROCEDURE — 76817 TRANSVAGINAL US OBSTETRIC: CPT

## 2021-04-05 PROCEDURE — 85025 COMPLETE CBC W/AUTO DIFF WBC: CPT | Performed by: EMERGENCY MEDICINE

## 2021-04-05 PROCEDURE — 84702 CHORIONIC GONADOTROPIN TEST: CPT | Performed by: EMERGENCY MEDICINE

## 2021-04-05 PROCEDURE — 93976 VASCULAR STUDY: CPT

## 2021-04-05 NOTE — ED PROVIDER NOTES
Subjective   Patient is a 35-year-old female who states she had a positive home pregnancy test and is several days late on her menstrual cycle.  She has developed vaginal bleeding over the past 24 hours.  She denies abdominal pain vomit diarrhea dysuria or other complaint.          Review of Systems  For cough fever chest pain vomit diarrhea dysuria achiness weight loss or other complaint  Past Medical History:   Diagnosis Date   • Anxiety    • Back pain        Allergies   Allergen Reactions   • Paxil [Paroxetine] Unknown - Low Severity     EPS       Past Surgical History:   Procedure Laterality Date   •  SECTION  2016 and 2018   • RADIOFREQUENCY ABLATION  2019   • TONSILLECTOMY  2018   • TONSILLECTOMY         Family History   Problem Relation Age of Onset   • Cancer Maternal Grandmother    • Hypertension Paternal Grandmother    • Heart disease Paternal Grandmother        Social History     Socioeconomic History   • Marital status:      Spouse name: Not on file   • Number of children: Not on file   • Years of education: Not on file   • Highest education level: Not on file   Tobacco Use   • Smoking status: Never Smoker   • Smokeless tobacco: Never Used   Substance and Sexual Activity   • Alcohol use: No   • Drug use: No   • Sexual activity: Defer           Objective   Physical Exam  Lungs are clear.  Heart is regular rhythm.  Chest nontender.  Abdomen soft with no tenderness.  Patient has normal bowel sounds without rebound or guarding.  Back has no CVA tenderness.  Pelvic exam shows blood in the vault.  Os is closed.  She has no adnexal tenderness or masses.  Procedures           ED Course      Results for orders placed or performed during the hospital encounter of 21   Urinalysis With Culture If Indicated - Urine, Clean Catch    Specimen: Urine, Clean Catch   Result Value Ref Range    Color, UA Yellow Yellow, Straw    Appearance, UA Clear Clear    pH, UA 6.0 5.0 - 8.0    Specific  Gravity, UA 1.017 1.005 - 1.030    Glucose, UA Negative Negative    Ketones, UA Negative Negative    Bilirubin, UA Negative Negative    Blood, UA Moderate (2+) (A) Negative    Protein, UA Negative Negative    Leuk Esterase, UA Negative Negative    Nitrite, UA Negative Negative    Urobilinogen, UA 0.2 E.U./dL 0.2 - 1.0 E.U./dL   hCG, Quantitative, Pregnancy    Specimen: Blood   Result Value Ref Range    HCG Quantitative 8.34 mIU/mL   CBC Auto Differential    Specimen: Blood   Result Value Ref Range    WBC 9.30 3.40 - 10.80 10*3/mm3    RBC 3.97 3.77 - 5.28 10*6/mm3    Hemoglobin 12.2 12.0 - 15.9 g/dL    Hematocrit 36.6 34.0 - 46.6 %    MCV 92.1 79.0 - 97.0 fL    MCH 30.8 26.6 - 33.0 pg    MCHC 33.4 31.5 - 35.7 g/dL    RDW 13.7 12.3 - 15.4 %    RDW-SD 44.2 37.0 - 54.0 fl    MPV 9.5 6.0 - 12.0 fL    Platelets 345 140 - 450 10*3/mm3    Neutrophil % 70.7 42.7 - 76.0 %    Lymphocyte % 23.7 19.6 - 45.3 %    Monocyte % 3.6 (L) 5.0 - 12.0 %    Eosinophil % 1.4 0.3 - 6.2 %    Basophil % 0.6 0.0 - 1.5 %    Neutrophils, Absolute 6.60 1.70 - 7.00 10*3/mm3    Lymphocytes, Absolute 2.20 0.70 - 3.10 10*3/mm3    Monocytes, Absolute 0.30 0.10 - 0.90 10*3/mm3    Eosinophils, Absolute 0.10 0.00 - 0.40 10*3/mm3    Basophils, Absolute 0.10 0.00 - 0.20 10*3/mm3    nRBC 0.2 0.0 - 0.2 /100 WBC   Scan Slide    Specimen: Blood   Result Value Ref Range    Hypochromia Slight/1+ None Seen    WBC Morphology Normal Normal    Platelet Estimate Adequate Normal    Large Platelets Slight/1+ None Seen   Urinalysis, Microscopic Only - Urine, Clean Catch    Specimen: Urine, Clean Catch   Result Value Ref Range    RBC, UA 13-20 (A) None Seen /HPF    WBC, UA None Seen None Seen /HPF    Bacteria, UA None Seen None Seen /HPF    Squamous Epithelial Cells, UA 0-2 None Seen, 0-2 /HPF    Hyaline Casts, UA 0-2 None Seen /LPF    Methodology Automated Microscopy    Light Blue Top   Result Value Ref Range    Extra Tube hold for add-on    Green Top (Gel)   Result  Value Ref Range    Extra Tube Hold for add-ons.    Gold Top - SST   Result Value Ref Range    Extra Tube HOLD      US Ob Limited 1 + Fetuses    Result Date: 2021  No intrauterine pregnancy. Differential diagnosis includes spontaneous , early intrauterine pregnancy or less likely ectopic pregnancy. Recommend follow-up beta hCG and consider follow-up ultrasound exam.  Electronically Signed By-Marsha Ladd MD On:2021 7:20 PM This report was finalized on  by  Marsha Ladd MD.    US Ob Transvaginal    Result Date: 2021  No intrauterine pregnancy. Differential diagnosis includes spontaneous , early intrauterine pregnancy or less likely ectopic pregnancy. Recommend follow-up beta hCG and consider follow-up ultrasound exam.  Electronically Signed By-Marsha Ladd MD On:2021 7:20 PM This report was finalized on  by  Marsha Ladd MD.                                         MDM  Number of Diagnoses or Management Options  Diagnosis management comments: Patient has hCG that is approximately 8.  Ultrasound pregnancy shows no intra-abdominal pregnancy.  Patient will be discharged with a diagnosis of threatened AB.  She will follow with her gynecologist for further outpatient evaluation.       Amount and/or Complexity of Data Reviewed  Clinical lab tests: reviewed  Tests in the radiology section of CPT®: reviewed    Risk of Complications, Morbidity, and/or Mortality  Presenting problems: moderate  Diagnostic procedures: moderate  Management options: moderate    Patient Progress  Patient progress: stable      Final diagnoses:   Threatened        ED Disposition  ED Disposition     ED Disposition Condition Comment    Discharge Stable           No follow-up provider specified.       Medication List      No changes were made to your prescriptions during this visit.          Praneeth Abdalla MD  21 [id number]

## 2021-07-24 ENCOUNTER — HOSPITAL ENCOUNTER (EMERGENCY)
Facility: HOSPITAL | Age: 36
Discharge: HOME | End: 2021-07-24
Admitting: EMERGENCY MEDICINE

## 2021-07-24 ENCOUNTER — APPOINTMENT (OUTPATIENT)
Dept: ULTRASOUND IMAGING | Facility: HOSPITAL | Age: 36
End: 2021-07-24

## 2021-07-24 VITALS
TEMPERATURE: 97.6 F | RESPIRATION RATE: 18 BRPM | HEART RATE: 96 BPM | SYSTOLIC BLOOD PRESSURE: 136 MMHG | HEIGHT: 66 IN | DIASTOLIC BLOOD PRESSURE: 90 MMHG | BODY MASS INDEX: 30.86 KG/M2 | OXYGEN SATURATION: 99 % | WEIGHT: 192.02 LBS

## 2021-07-24 DIAGNOSIS — R10.9 FLANK PAIN: ICD-10-CM

## 2021-07-24 DIAGNOSIS — O20.9 VAGINAL BLEEDING IN PREGNANCY, FIRST TRIMESTER: ICD-10-CM

## 2021-07-24 DIAGNOSIS — O20.0 THREATENED ABORTION: Primary | ICD-10-CM

## 2021-07-24 LAB
ABO GROUP BLD: NORMAL
ALBUMIN SERPL-MCNC: 4.2 G/DL (ref 3.5–5.2)
ALBUMIN/GLOB SERPL: 1.4 G/DL
ALP SERPL-CCNC: 57 U/L (ref 39–117)
ALT SERPL W P-5'-P-CCNC: 22 U/L (ref 1–33)
ANION GAP SERPL CALCULATED.3IONS-SCNC: 11 MMOL/L (ref 5–15)
AST SERPL-CCNC: 27 U/L (ref 1–32)
BASOPHILS # BLD AUTO: 0.1 10*3/MM3 (ref 0–0.2)
BASOPHILS NFR BLD AUTO: 0.8 % (ref 0–1.5)
BILIRUB SERPL-MCNC: 0.2 MG/DL (ref 0–1.2)
BILIRUB UR QL STRIP: NEGATIVE
BLD GP AB SCN SERPL QL: NEGATIVE
BUN SERPL-MCNC: 18 MG/DL (ref 6–20)
BUN/CREAT SERPL: 20.2 (ref 7–25)
C TRACH RRNA CVX QL NAA+PROBE: NOT DETECTED
CALCIUM SPEC-SCNC: 9 MG/DL (ref 8.6–10.5)
CHLORIDE SERPL-SCNC: 103 MMOL/L (ref 98–107)
CLARITY UR: CLEAR
CLUE CELLS SPEC QL WET PREP: ABNORMAL
CO2 SERPL-SCNC: 23 MMOL/L (ref 22–29)
COLOR UR: YELLOW
CREAT SERPL-MCNC: 0.89 MG/DL (ref 0.57–1)
DEPRECATED RDW RBC AUTO: 45.9 FL (ref 37–54)
EOSINOPHIL # BLD AUTO: 0.3 10*3/MM3 (ref 0–0.4)
EOSINOPHIL NFR BLD AUTO: 3.6 % (ref 0.3–6.2)
ERYTHROCYTE [DISTWIDTH] IN BLOOD BY AUTOMATED COUNT: 14.4 % (ref 12.3–15.4)
GFR SERPL CREATININE-BSD FRML MDRD: 71 ML/MIN/1.73
GLOBULIN UR ELPH-MCNC: 3.1 GM/DL
GLUCOSE SERPL-MCNC: 118 MG/DL (ref 65–99)
GLUCOSE UR STRIP-MCNC: NEGATIVE MG/DL
HCG INTACT+B SERPL-ACNC: 2.29 MIU/ML
HCT VFR BLD AUTO: 35.7 % (ref 34–46.6)
HGB BLD-MCNC: 11.8 G/DL (ref 12–15.9)
HGB UR QL STRIP.AUTO: NEGATIVE
HOLD SPECIMEN: NORMAL
HYDATID CYST SPEC WET PREP: ABNORMAL
KETONES UR QL STRIP: NEGATIVE
LEUKOCYTE ESTERASE UR QL STRIP.AUTO: NEGATIVE
LYMPHOCYTES # BLD AUTO: 1.9 10*3/MM3 (ref 0.7–3.1)
LYMPHOCYTES NFR BLD AUTO: 21.6 % (ref 19.6–45.3)
MCH RBC QN AUTO: 29.8 PG (ref 26.6–33)
MCHC RBC AUTO-ENTMCNC: 33.2 G/DL (ref 31.5–35.7)
MCV RBC AUTO: 89.8 FL (ref 79–97)
MONOCYTES # BLD AUTO: 0.5 10*3/MM3 (ref 0.1–0.9)
MONOCYTES NFR BLD AUTO: 5.6 % (ref 5–12)
N GONORRHOEA RRNA SPEC QL NAA+PROBE: NOT DETECTED
NEUTROPHILS NFR BLD AUTO: 6 10*3/MM3 (ref 1.7–7)
NEUTROPHILS NFR BLD AUTO: 68.4 % (ref 42.7–76)
NITRITE UR QL STRIP: NEGATIVE
NRBC BLD AUTO-RTO: 0 /100 WBC (ref 0–0.2)
PH UR STRIP.AUTO: 5.5 [PH] (ref 5–8)
PLATELET # BLD AUTO: 234 10*3/MM3 (ref 140–450)
PMV BLD AUTO: 8.4 FL (ref 6–12)
POTASSIUM SERPL-SCNC: 4.4 MMOL/L (ref 3.5–5.2)
PROT SERPL-MCNC: 7.3 G/DL (ref 6–8.5)
PROT UR QL STRIP: NEGATIVE
RBC # BLD AUTO: 3.98 10*6/MM3 (ref 3.77–5.28)
RH BLD: POSITIVE
SODIUM SERPL-SCNC: 137 MMOL/L (ref 136–145)
SP GR UR STRIP: 1.02 (ref 1–1.03)
T VAGINALIS SPEC QL WET PREP: ABNORMAL
T&S EXPIRATION DATE: NORMAL
UROBILINOGEN UR QL STRIP: NORMAL
WBC # BLD AUTO: 8.8 10*3/MM3 (ref 3.4–10.8)
WBC SPEC QL WET PREP: ABNORMAL
YEAST GENITAL QL WET PREP: ABNORMAL

## 2021-07-24 PROCEDURE — 86850 RBC ANTIBODY SCREEN: CPT | Performed by: NURSE PRACTITIONER

## 2021-07-24 PROCEDURE — 81003 URINALYSIS AUTO W/O SCOPE: CPT | Performed by: NURSE PRACTITIONER

## 2021-07-24 PROCEDURE — 87210 SMEAR WET MOUNT SALINE/INK: CPT | Performed by: NURSE PRACTITIONER

## 2021-07-24 PROCEDURE — 80053 COMPREHEN METABOLIC PANEL: CPT | Performed by: NURSE PRACTITIONER

## 2021-07-24 PROCEDURE — 76817 TRANSVAGINAL US OBSTETRIC: CPT

## 2021-07-24 PROCEDURE — 84702 CHORIONIC GONADOTROPIN TEST: CPT | Performed by: NURSE PRACTITIONER

## 2021-07-24 PROCEDURE — 86901 BLOOD TYPING SEROLOGIC RH(D): CPT | Performed by: NURSE PRACTITIONER

## 2021-07-24 PROCEDURE — 87591 N.GONORRHOEAE DNA AMP PROB: CPT | Performed by: NURSE PRACTITIONER

## 2021-07-24 PROCEDURE — 76801 OB US < 14 WKS SINGLE FETUS: CPT

## 2021-07-24 PROCEDURE — 93976 VASCULAR STUDY: CPT

## 2021-07-24 PROCEDURE — 36415 COLL VENOUS BLD VENIPUNCTURE: CPT | Performed by: NURSE PRACTITIONER

## 2021-07-24 PROCEDURE — 99283 EMERGENCY DEPT VISIT LOW MDM: CPT

## 2021-07-24 PROCEDURE — 96360 HYDRATION IV INFUSION INIT: CPT

## 2021-07-24 PROCEDURE — 85025 COMPLETE CBC W/AUTO DIFF WBC: CPT | Performed by: NURSE PRACTITIONER

## 2021-07-24 PROCEDURE — 86900 BLOOD TYPING SEROLOGIC ABO: CPT | Performed by: NURSE PRACTITIONER

## 2021-07-24 PROCEDURE — 87491 CHLMYD TRACH DNA AMP PROBE: CPT | Performed by: NURSE PRACTITIONER

## 2021-07-24 RX ORDER — SODIUM CHLORIDE 0.9 % (FLUSH) 0.9 %
10 SYRINGE (ML) INJECTION AS NEEDED
Status: DISCONTINUED | OUTPATIENT
Start: 2021-07-24 | End: 2021-07-24 | Stop reason: HOSPADM

## 2021-07-24 RX ADMIN — SODIUM CHLORIDE 1000 ML: 9 INJECTION, SOLUTION INTRAVENOUS at 16:40

## 2021-07-24 NOTE — DISCHARGE INSTRUCTIONS
Return to ER for dizziness near syncope or passing out episodes otherwise follow-up with GYN on August 5.

## 2021-07-24 NOTE — ED PROVIDER NOTES
"Subjective   History: Patient is a 35-year-old pregnant female, positive home pregnancy test, G 10  complains of left flank pain that started yesterday.  Waxes wanes, cramp, nothing makes it better or worse, has not taken medication for pain.  Reports she is also has some discomfort between her shoulder blades.  States this a.m. she is been having some vaginal bleeding and cramping reports moderate amount of blood says it is like she is on her \"cycle\".  Reports she woke up around noon had some bleeding then but then went back to sleep and when she woke up around 2 PM noticed she continued to bleed.  Denies blood thinners.  Reports she had a miscarriage in April of this year, she was seen in the ER and then discharged home.  She reports she had a miscarriage the following day but then lost so much blood she had to come back in for blood transfusion.    LMP: : \"some time in \"  OBGYN: Hank (first appointment upcoming )  G 10     Onset: Noon  Location: Vaginal  Duration: Continuous  Character: Bleeding  Aggravating/Alleviating factors: None  Radiation not applicable  Severity: Moderate            Review of Systems   Constitutional: Negative for chills, fatigue and fever.   HENT: Negative for congestion, sore throat, tinnitus and trouble swallowing.    Eyes: Negative for photophobia, discharge and visual disturbance.   Respiratory: Negative for cough and shortness of breath.    Cardiovascular: Negative for chest pain.   Gastrointestinal: Negative for abdominal pain, diarrhea, nausea and vomiting.   Genitourinary: Positive for flank pain and vaginal bleeding. Negative for dysuria, frequency, urgency, vaginal discharge and vaginal pain.   Musculoskeletal: Negative for back pain and myalgias.   Skin: Negative for rash.   Neurological: Negative for dizziness and headaches.   Psychiatric/Behavioral: Negative for confusion.       Past Medical History:   Diagnosis Date   • Anxiety    • Back pain  "       Allergies   Allergen Reactions   • Paxil [Paroxetine] Unknown - Low Severity     EPS       Past Surgical History:   Procedure Laterality Date   •  SECTION  2016 and 2018   • RADIOFREQUENCY ABLATION  2019   • TONSILLECTOMY  2018   • TONSILLECTOMY         Family History   Problem Relation Age of Onset   • Cancer Maternal Grandmother    • Hypertension Paternal Grandmother    • Heart disease Paternal Grandmother        Social History     Socioeconomic History   • Marital status:      Spouse name: Not on file   • Number of children: Not on file   • Years of education: Not on file   • Highest education level: Not on file   Tobacco Use   • Smoking status: Never Smoker   • Smokeless tobacco: Never Used   Substance and Sexual Activity   • Alcohol use: No   • Drug use: No   • Sexual activity: Defer           Objective   Physical Exam  Vitals reviewed. Exam conducted with a chaperone present (Yuni Apple).   Constitutional:       General: She is not in acute distress.     Appearance: She is obese. She is not toxic-appearing.   HENT:      Head: Normocephalic and atraumatic.      Right Ear: Tympanic membrane normal.      Left Ear: Tympanic membrane normal.      Mouth/Throat:      Mouth: Mucous membranes are moist.      Pharynx: Oropharynx is clear.   Eyes:      Extraocular Movements: Extraocular movements intact.      Pupils: Pupils are equal, round, and reactive to light.   Cardiovascular:      Rate and Rhythm: Tachycardia present.      Pulses: Normal pulses.      Heart sounds: Normal heart sounds. No murmur heard.     Pulmonary:      Effort: Pulmonary effort is normal.      Breath sounds: Normal breath sounds.   Abdominal:      General: Bowel sounds are normal. There is no distension.      Palpations: Abdomen is soft.      Tenderness: There is abdominal tenderness in the suprapubic area. There is no right CVA tenderness or left CVA tenderness.   Genitourinary:     Exam position: Lithotomy  "position.      Labia:         Right: No rash, tenderness, lesion or injury.         Left: No rash, tenderness, lesion or injury.       Vagina: Normal.      Cervix: Dilated. Cervical bleeding present.      Comments: Moderate amount of bright red vaginal bleeding without any tissue or clots in vaginal vault.  Able to clear vault with two 4 x 4's there is mild active bleeding from cervical os with mild dilation of os.  Musculoskeletal:         General: Normal range of motion.      Cervical back: Normal range of motion and neck supple.   Skin:     General: Skin is warm and dry.      Findings: No rash.   Neurological:      Mental Status: She is alert and oriented to person, place, and time.   Psychiatric:         Mood and Affect: Mood normal.         Behavior: Behavior normal.         Thought Content: Thought content normal.         Judgment: Judgment normal.         Procedures           ED Course      /90 (BP Location: Left arm)   Pulse 96   Temp 97.6 °F (36.4 °C) (Oral)   Resp 18   Ht 167.6 cm (66\")   Wt 87.1 kg (192 lb 0.3 oz)   LMP 06/02/2021 (Approximate)   SpO2 99%   Breastfeeding No   BMI 30.99 kg/m²   Labs Reviewed   WET PREP, GENITAL - Abnormal; Notable for the following components:       Result Value    WBC'S 1+ WBC's seen (*)     All other components within normal limits   COMPREHENSIVE METABOLIC PANEL - Abnormal; Notable for the following components:    Glucose 118 (*)     All other components within normal limits    Narrative:     GFR Normal >60  Chronic Kidney Disease <60  Kidney Failure <15     CBC WITH AUTO DIFFERENTIAL - Abnormal; Notable for the following components:    Hemoglobin 11.8 (*)     All other components within normal limits   URINALYSIS W/ CULTURE IF INDICATED - Normal    Narrative:     Urine microscopic not indicated.   CHLAMYDIA TRACHOMATIS, NEISSERIA GONORRHOEAE, PCR   HCG, QUANTITATIVE, PREGNANCY    Narrative:     HCG Ranges by Gestational Age    Females - non-pregnant " premenopausal   </= 1mIU/mL HCG  Females - postmenopausal               </= 7mIU/mL HCG    3 Weeks         5.8 -    71.2 mIU/mL  4 Weeks         9.5 -     750 mIU/mL  5 Weeks         217 -   7,138 mIU/mL  6 Weeks         158 -  31,795 mIU/mL  7 Weeks       3,697 - 163,563 mIU/mL  8 Weeks      32,065 - 149,571 mIU/mL  9 Weeks      63,803 - 151,410 mIU/mL  10 Weeks     46,509 - 186,977 mIU/mL  12 Weeks     27,832 - 210,612 mIU/mL  14 Weeks     13,950 -  62,530 mIU/mL  15 Weeks     12,039 -  70,971 mIU/mL  16 Weeks      9,040 -  56,451 mIU/mL  17 Weeks      8,175 -  55,868 mIU/mL  18 Weeks      8,099 -  58,176 mIU/mL  Results may be falsely decreased if patient taking Biotin.     TYPE AND SCREEN   BB ARMBAND CHECK   CBC AND DIFFERENTIAL    Narrative:     The following orders were created for panel order CBC & Differential.  Procedure                               Abnormality         Status                     ---------                               -----------         ------                     CBC Auto Differential[[id number]]        Abnormal            Final result                 Please view results for these tests on the individual orders.   EXTRA TUBES    Narrative:     The following orders were created for panel order Extra Tubes.  Procedure                               Abnormality         Status                     ---------                               -----------         ------                     Green Top (Gel)[[id number]]                                  Final result                 Please view results for these tests on the individual orders.   GREEN TOP     Medications   sodium chloride 0.9 % flush 10 mL (has no administration in time range)   sodium chloride 0.9 % bolus 1,000 mL (1,000 mL Intravenous New Bag 7/24/21 1640)     US Ob < 14 Weeks Single or First Gestation    Result Date: 7/24/2021  1.No significant underlying abnormality. If there is concern for pregnancy correlation with hCG level would be  recommended. An intrauterine pregnancy is not apparent on this exam.  Electronically Signed By-Reg Barbosa MD On:7/24/2021 5:18 PM This report was finalized on [id number] by  Reg Barbosa MD.    US Ob Transvaginal    Result Date: 7/24/2021  1.No significant underlying abnormality. If there is concern for pregnancy correlation with hCG level would be recommended. An intrauterine pregnancy is not apparent on this exam.  Electronically Signed By-Reg Barbosa MD On:7/24/2021 5:18 PM This report was finalized on [id number] by  Reg Barbosa MD.                                         MDM     I examined the patient using the appropriate personal protective equipment.      DISPOSITION:   Chart Review:  Comorbidity:  has a past medical history of Anxiety and Back pain.  Differentials:this list is not all inclusive and does not constitute the entirety of considered causes --> early intrauterine pregnancy miscarriage less likely ectopic pregnancy.  ECG: interpreted by ER physician and reviewed by myself: Applicable  Labs: CBC H&H 11.8 and 35.7 platelets 234.  Metabolic panel glucose 118 otherwise unremarkable.  Wet prep negative for yeast half a clue cells and trichomonas, +1+ WBCs.  Urinalysis unremarkable.  GC chlamydia pending.    Imaging: Was interpreted by physician and reviewed by myself:  US Ob < 14 Weeks Single or First Gestation    Result Date: 7/24/2021  1.No significant underlying abnormality. If there is concern for pregnancy correlation with hCG level would be recommended. An intrauterine pregnancy is not apparent on this exam.  Electronically Signed By-Reg Barbosa MD On:7/24/2021 5:18 PM This report was finalized on [id number] by  Reg Barbosa MD.    US Ob Transvaginal    Result Date: 7/24/2021  1.No significant underlying abnormality. If there is concern for pregnancy correlation with hCG level would be recommended. An intrauterine pregnancy is not apparent on this exam.  Electronically Signed  By-Reg Barbosa MD On:2021 5:18 PM This report was finalized on [id number] by  Reg Barbosa MD.      Disposition/Treatment:    IV initiated blood drawn in and out cath collected for urinalysis and dirty urine collected for GC chlamydia.  Patient denies any concern for STDs, GC chlamydia pending.  H&H 11.8 and 35.71 L normal saline was given.  No intrauterine pregnancy On ultrasound and hCG of 2.  Patient will be discharged home discussed emergent reasons to return to ER otherwise keep appointment with OB GYN, Dr. Mckinney,  for follow-up.  Patient verbalized understanding agreeable plan of care.      Final diagnoses:   Threatened    Flank pain   Vaginal bleeding in pregnancy, first trimester       ED Disposition  ED Disposition     ED Disposition Condition Comment    Discharge Stable           No follow-up provider specified.       Medication List      No changes were made to your prescriptions during this visit.          Yolanda Hargrove, APRN  [phone]

## 2021-08-09 ENCOUNTER — HOSPITAL ENCOUNTER (EMERGENCY)
Facility: HOSPITAL | Age: 36
Discharge: LEFT BEFORE DISCHARGE | End: 2021-08-09

## 2021-08-09 PROCEDURE — 99211 OFF/OP EST MAY X REQ PHY/QHP: CPT

## 2021-08-23 ENCOUNTER — OFFICE VISIT (OUTPATIENT)
Dept: PSYCHIATRY | Facility: CLINIC | Age: 36
End: 2021-08-23

## 2021-08-23 DIAGNOSIS — F41.1 GENERALIZED ANXIETY DISORDER: Chronic | ICD-10-CM

## 2021-08-23 DIAGNOSIS — F33.0 MILD EPISODE OF RECURRENT MAJOR DEPRESSIVE DISORDER (HCC): Primary | Chronic | ICD-10-CM

## 2021-08-23 PROCEDURE — 90792 PSYCH DIAG EVAL W/MED SRVCS: CPT | Performed by: PHYSICIAN ASSISTANT

## 2021-08-23 RX ORDER — PROPRANOLOL HYDROCHLORIDE 10 MG/1
10 TABLET ORAL 2 TIMES DAILY PRN
Qty: 60 TABLET | Refills: 2 | Status: SHIPPED | OUTPATIENT
Start: 2021-08-23 | End: 2023-01-03

## 2021-08-23 RX ORDER — LISDEXAMFETAMINE DIMESYLATE 50 MG
50 CAPSULE ORAL AS NEEDED
COMMUNITY
Start: 2021-08-20

## 2021-08-23 RX ORDER — CLONAZEPAM 1 MG/1
1 TABLET ORAL AS NEEDED
COMMUNITY
Start: 2021-08-20 | End: 2023-01-03

## 2021-08-23 RX ORDER — DULOXETIN HYDROCHLORIDE 20 MG/1
CAPSULE, DELAYED RELEASE ORAL
COMMUNITY
Start: 2021-08-20 | End: 2022-02-21

## 2021-08-23 NOTE — PROGRESS NOTES
Subjective   Renae Tang is a 35 y.o. female who presents today for initial evaluation in the office x 45 minutes    Chief Complaint:  Anxiety, depression, ADD    History of Present Illness:   Anxiety, gets an overwhelming fear of things, started 3 years ago while working for [employer]  Dr. 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    PAST PSYCHIATRIC HISTORY  Axis I  Affective/Bipoloar Disorder, Anxiety/Panic Disorder  Axis II  None    PAST OUTPATIENT TREATMENT  Diagnosis treated:  Affective Disorder, Anxiety/Panic Disorder  Treatment Type:  Individual Therapy, Medication Management, Supportive Counseling  Prior Psychiatric Medications:  Elavil, made tongue hurt, loopy  Paxil, took it twice, second time caused jaw spasms  Lexapro was helpful but had muscle cramps  Hydroxyzine  Benadryl used to help anxiety   Propranolol 20 mg made her tired  Buspar, did not work  Pristiq, weight gain  Wellbutrin, cried the whole time  Viibryd  Vraylar  Zoloft, nausea  Gabapentin  Cymbalta  Ativan worked at first but then less effective  Valium  Klonopin  Support Groups:  None  Sequelae Of Mental Disorder:  emotional  distress      Interval History  No Change    Side Effects  None      Past Medical History:  Past Medical History:   Diagnosis Date   • ADHD (attention deficit hyperactivity disorder)    • Anxiety    • Back pain    • Depression        Social History:  Social History     Socioeconomic History   • Marital status:      Spouse name: Not on file   • Number of children: Not on file   • Years of education: Not on file   • Highest education level: Not on file   Tobacco Use   • Smoking status: Never Smoker   • Smokeless tobacco: Never Used   Substance and Sexual Activity   • Alcohol use: No   • Drug use: No   • Sexual activity: Yes     Partners: Male       Family History:  Family History   Problem Relation Age of Onset   • Cancer Maternal Grandmother    • Hypertension Paternal Grandmother    • Heart disease Paternal Grandmother    • Depression Sister    • Depression Brother    • Drug abuse Brother        Past Surgical History:  Past Surgical History:   Procedure Laterality Date   •  SECTION  2016 and 2018   • RADIOFREQUENCY ABLATION  2019   • TONSILLECTOMY  2018   • TONSILLECTOMY         Problem List:  Patient Active Problem List   Diagnosis   • COVID-19 virus infection   • History of gestational hypertension   • Obesity   • Generalized anxiety disorder   • Mild episode of recurrent major depressive disorder (CMS/HCC)       Allergy:   Allergies   Allergen Reactions   • Paxil [Paroxetine] Unknown - Low Severity     EPS        Discontinued Medications:  Medications Discontinued During This Encounter   Medication Reason   • LORazepam (ATIVAN) 1 MG tablet Alternate therapy   • phentermine (ADIPEX-P) 37.5 MG tablet Alternate therapy   • amitriptyline (ELAVIL) 150 MG tablet Alternate therapy   • benzonatate (TESSALON) 200 MG capsule *Therapy completed   • ibuprofen (ADVIL,MOTRIN) 800 MG tablet *Therapy completed       Current Medications:   Current Outpatient Medications   Medication Sig Dispense Refill    • clonazePAM (KlonoPIN) 1 MG tablet      • DULoxetine (CYMBALTA) 20 MG capsule      • propranolol (INDERAL) 10 MG tablet Take 1 tablet by mouth 2 (Two) Times a Day As Needed (anxiety). 60 tablet 2   • Vyvanse 50 MG capsule        No current facility-administered medications for this visit.         Psychological ROS: positive for - anxiety, concentration difficulties, depression and sleep disturbances  negative for - behavioral disorder, decreased libido, disorientation, hallucinations, hostility, irritability, memory difficulties, mood swings, obsessive thoughts, physical abuse, sexual abuse or suicidal ideation      Physical Exam:   Last menstrual period 06/02/2021, not currently breastfeeding.    Mental Status Exam:   Hygiene:   good  Cooperation:  Cooperative  Eye Contact:  Good  Psychomotor Behavior:  Appropriate  Affect:  Blunted  Mood: anxious  Hopelessness: Denies  Speech:  Normal  Thought Process:  Goal directed  Thought Content:  Normal  Suicidal:  None  Homicidal:  None  Hallucinations:  None  Delusion:  None  Memory:  Intact  Orientation:  Person, Place, Time and Situation  Reliability:  good  Insight:  Good  Judgement:  Good  Impulse Control:  Good  Physical/Medical Issues:  Yes       PHQ-9 Depression Screening  Little interest or pleasure in doing things? 1   Feeling down, depressed, or hopeless? 1   Trouble falling or staying asleep, or sleeping too much? 1   Feeling tired or having little energy? 1   Poor appetite or overeating? 1   Feeling bad about yourself - or that you are a failure or have let yourself or your family down? 1   Trouble concentrating on things, such as reading the newspaper or watching television? 1   Moving or speaking so slowly that other people could have noticed? Or the opposite - being so fidgety or restless that you have been moving around a lot more than usual? 0   Thoughts that you would be better off dead, or of hurting yourself in some way? 0   PHQ-9 Total Score 7   If  you checked off any problems, how difficult have these problems made it for you to do your work, take care of things at home, or get along with other people?             Never smoker    I advised Renae of the risks of tobacco use.     Lab Results:   Admission on 07/24/2021, Discharged on 07/24/2021   Component Date Value Ref Range Status   • Glucose 07/24/2021 118* 65 - 99 mg/dL Final   • BUN 07/24/2021 18  6 - 20 mg/dL Final   • Creatinine 07/24/2021 0.89  0.57 - 1.00 mg/dL Final   • Sodium 07/24/2021 137  136 - 145 mmol/L Final   • Potassium 07/24/2021 4.4  3.5 - 5.2 mmol/L Final    Slight hemolysis detected by analyzer. Results may be affected.   • Chloride 07/24/2021 103  98 - 107 mmol/L Final   • CO2 07/24/2021 23.0  22.0 - 29.0 mmol/L Final   • Calcium 07/24/2021 9.0  8.6 - 10.5 mg/dL Final   • Total Protein 07/24/2021 7.3  6.0 - 8.5 g/dL Final   • Albumin 07/24/2021 4.20  3.50 - 5.20 g/dL Final   • ALT (SGPT) 07/24/2021 22  1 - 33 U/L Final   • AST (SGOT) 07/24/2021 27  1 - 32 U/L Final    Slight hemolysis detected by analyzer. Results may be affected.   • Alkaline Phosphatase 07/24/2021 57  39 - 117 U/L Final   • Total Bilirubin 07/24/2021 0.2  0.0 - 1.2 mg/dL Final   • eGFR Non  Amer 07/24/2021 71  >60 mL/min/1.73 Final   • Globulin 07/24/2021 3.1  gm/dL Final   • A/G Ratio 07/24/2021 1.4  g/dL Final   • BUN/Creatinine Ratio 07/24/2021 20.2  7.0 - 25.0 Final   • Anion Gap 07/24/2021 11.0  5.0 - 15.0 mmol/L Final   • HCG Quantitative 07/24/2021 2.29  mIU/mL Final   • ABO Type 07/24/2021 A   Final   • RH type 07/24/2021 Positive   Final   • Antibody Screen 07/24/2021 Negative   Final   • T&S Expiration Date 07/24/2021 7/27/2021 11:59:59 PM   Final   • YEAST 07/24/2021 No yeast seen  No yeast seen Final   • HYPHAL ELEMENTS 07/24/2021 No Hyphal elements seen  No Hyphal elements seen Final   • WBC'S 07/24/2021 1+ WBC's seen* No WBC's seen Final   • Clue Cells, Wet Prep 07/24/2021 No Clue cells seen  No  Clue cells seen Final   • Trichomonas, Wet Prep 07/24/2021 No Trichomonas seen  No Trichomonas seen Final   • Chlamydia DNA by PCR 07/24/2021 Not Detected  Not Detected, Invalid Final   • Neisseria gonorrhoeae by PCR 07/24/2021 Not Detected  Not Detected Final   • Color, UA 07/24/2021 Yellow  Yellow, Straw Final   • Appearance, UA 07/24/2021 Clear  Clear Final   • pH, UA 07/24/2021 5.5  5.0 - 8.0 Final   • Specific Gravity, UA 07/24/2021 1.020  1.005 - 1.030 Final   • Glucose, UA 07/24/2021 Negative  Negative Final   • Ketones, UA 07/24/2021 Negative  Negative Final   • Bilirubin, UA 07/24/2021 Negative  Negative Final   • Blood, UA 07/24/2021 Negative  Negative Final   • Protein, UA 07/24/2021 Negative  Negative Final   • Leuk Esterase, UA 07/24/2021 Negative  Negative Final   • Nitrite, UA 07/24/2021 Negative  Negative Final   • Urobilinogen, UA 07/24/2021 0.2 E.U./dL  0.2 - 1.0 E.U./dL Final   • WBC 07/24/2021 8.80  3.40 - 10.80 10*3/mm3 Final   • RBC 07/24/2021 3.98  3.77 - 5.28 10*6/mm3 Final   • Hemoglobin 07/24/2021 11.8* 12.0 - 15.9 g/dL Final   • Hematocrit 07/24/2021 35.7  34.0 - 46.6 % Final   • MCV 07/24/2021 89.8  79.0 - 97.0 fL Final   • MCH 07/24/2021 29.8  26.6 - 33.0 pg Final   • MCHC 07/24/2021 33.2  31.5 - 35.7 g/dL Final   • RDW 07/24/2021 14.4  12.3 - 15.4 % Final   • RDW-SD 07/24/2021 45.9  37.0 - 54.0 fl Final   • MPV 07/24/2021 8.4  6.0 - 12.0 fL Final   • Platelets 07/24/2021 234  140 - 450 10*3/mm3 Final   • Neutrophil % 07/24/2021 68.4  42.7 - 76.0 % Final   • Lymphocyte % 07/24/2021 21.6  19.6 - 45.3 % Final   • Monocyte % 07/24/2021 5.6  5.0 - 12.0 % Final   • Eosinophil % 07/24/2021 3.6  0.3 - 6.2 % Final   • Basophil % 07/24/2021 0.8  0.0 - 1.5 % Final   • Neutrophils, Absolute 07/24/2021 6.00  1.70 - 7.00 10*3/mm3 Final   • Lymphocytes, Absolute 07/24/2021 1.90  0.70 - 3.10 10*3/mm3 Final   • Monocytes, Absolute 07/24/2021 0.50  0.10 - 0.90 10*3/mm3 Final   • Eosinophils, Absolute  07/24/2021 0.30  0.00 - 0.40 10*3/mm3 Final   • Basophils, Absolute 07/24/2021 0.10  0.00 - 0.20 10*3/mm3 Final   • nRBC 07/24/2021 0.0  0.0 - 0.2 /100 WBC Final   • Extra Tube 07/24/2021 HOLD   Final       Assessment/Plan   Problems Addressed this Visit        Mental Health    Generalized anxiety disorder (Chronic)    Relevant Medications    Vyvanse 50 MG capsule    DULoxetine (CYMBALTA) 20 MG capsule    Mild episode of recurrent major depressive disorder (CMS/HCC) - Primary (Chronic)    Relevant Medications    Vyvanse 50 MG capsule    DULoxetine (CYMBALTA) 20 MG capsule    Other Relevant Orders    [facility] - Swab,      Diagnoses       Codes Comments    Mild episode of recurrent major depressive disorder (CMS/HCC)    -  Primary ICD-10-CM: F33.0  ICD-9-CM: 296.31     Generalized anxiety disorder     ICD-10-CM: F41.1  ICD-9-CM: 300.02           Visit Diagnoses:    ICD-10-CM ICD-9-CM   1. Mild episode of recurrent major depressive disorder (CMS/HCC)  F33.0 296.31   2. Generalized anxiety disorder  F41.1 300.02       TREATMENT PLAN/GOALS: Continue supportive psychotherapy efforts and medications as indicated. Treatment and medication options discussed during today's visit. Patient ackowledged and verbally consented to continue with current treatment plan and was educated on the importance of compliance with treatment and follow-up appointments.    MEDICATION ISSUES:  INSPECT reviewed as expected  Discussed medication options and treatment plan of prescribed medication as well as the risks, benefits, and side effects including potential falls, possible impaired driving and metabolic adversities among others. Patient is agreeable to call the office with any worsening of symptoms or onset of side effects. Patient is agreeable to call 911 or go to the nearest ER should he/she begin having SI/HI. No medication side effects or related complaints today.     Patient wants to continue Cymbalta 20mg daily, Vyvanse 50mg daily  and Klonopin 1mg BID for now (all prescribed by Dr. Parker)  [facility] testing done to help evaluate the best practice medication for her, will call her to discuss the results  Trial of Propranolol 10mg Bid prn anxiety    MEDS ORDERED DURING VISIT:  New Medications Ordered This Visit   Medications   • propranolol (INDERAL) 10 MG tablet     Sig: Take 1 tablet by mouth 2 (Two) Times a Day As Needed (anxiety).     Dispense:  60 tablet     Refill:  2       Return in about 3 months (around 11/23/2021).         This document has been electronically signed by Ashley Meeks PA-C  August 23, 2021 14:49 EDT    EMR Dragon transcription disclaimer:  Some of this encounter note is an electronic transcription translation of spoken language to printed text. The electronic translation of spoken language may permit erroneous, or at times, nonsensical words or phrases to be inadvertently transcribed; Although I have reviewed the note for such errors some may still exist.

## 2021-09-07 NOTE — PROGRESS NOTES
I called the patient to discuss her [facility] results but got her voicemail.  I left her message letting her know that the results were back and I wanted to discuss them but I will be out of the office for the next few days, so I will call her on Monday when I return.

## 2021-09-14 ENCOUNTER — TELEPHONE (OUTPATIENT)
Dept: PSYCHIATRY | Facility: CLINIC | Age: 36
End: 2021-09-14

## 2021-09-14 RX ORDER — LEVOMILNACIPRAN HYDROCHLORIDE 40 MG/1
40 CAPSULE, EXTENDED RELEASE ORAL DAILY
Qty: 30 CAPSULE | Refills: 0
Start: 2021-09-14 | End: 2022-02-21

## 2021-09-14 NOTE — TELEPHONE ENCOUNTER
Called patient again to discuss her GeneSight results.  Wellbutrin, Pristiq, Fetzima and Viibryd are all use as directed, although the patient reports that she had weight gain with Pristiq, SI with higher dose of Viibryd and increased crying with Wellbutrin, so we will start Fetzima 20 mg daily for a month and then increase to 40 mg daily.  She will, tomorrow to  samples and a copy of her GeneSight results.  She plans to discontinue the Cymbalta since it is no longer helping her back pain either.  She can continue the propranolol and Klonopin and Vyvanse as directed.

## 2021-09-28 ENCOUNTER — HOSPITAL ENCOUNTER (EMERGENCY)
Facility: HOSPITAL | Age: 36
Discharge: HOME | End: 2021-09-28
Attending: EMERGENCY MEDICINE | Admitting: EMERGENCY MEDICINE

## 2021-09-28 ENCOUNTER — APPOINTMENT (OUTPATIENT)
Dept: CT IMAGING | Facility: HOSPITAL | Age: 36
End: 2021-09-28

## 2021-09-28 VITALS
TEMPERATURE: 97.8 F | HEART RATE: 79 BPM | HEIGHT: 66 IN | BODY MASS INDEX: 32.21 KG/M2 | OXYGEN SATURATION: 96 % | SYSTOLIC BLOOD PRESSURE: 99 MMHG | WEIGHT: 200.4 LBS | DIASTOLIC BLOOD PRESSURE: 63 MMHG | RESPIRATION RATE: 16 BRPM

## 2021-09-28 DIAGNOSIS — R10.12 LEFT UPPER QUADRANT ABDOMINAL PAIN: Primary | ICD-10-CM

## 2021-09-28 LAB
ALBUMIN SERPL-MCNC: 3.8 G/DL (ref 3.5–5.2)
ALBUMIN/GLOB SERPL: 1.5 G/DL
ALP SERPL-CCNC: 55 U/L (ref 39–117)
ALT SERPL W P-5'-P-CCNC: 17 U/L (ref 1–33)
AMPHET+METHAMPHET UR QL: POSITIVE
ANION GAP SERPL CALCULATED.3IONS-SCNC: 13 MMOL/L (ref 5–15)
AST SERPL-CCNC: 18 U/L (ref 1–32)
BARBITURATES UR QL SCN: NEGATIVE
BASOPHILS # BLD AUTO: 0.1 10*3/MM3 (ref 0–0.2)
BASOPHILS NFR BLD AUTO: 1 % (ref 0–1.5)
BENZODIAZ UR QL SCN: POSITIVE
BILIRUB SERPL-MCNC: 0.3 MG/DL (ref 0–1.2)
BILIRUB UR QL STRIP: NEGATIVE
BUN SERPL-MCNC: 12 MG/DL (ref 6–20)
BUN/CREAT SERPL: 12.6 (ref 7–25)
CALCIUM SPEC-SCNC: 7.4 MG/DL (ref 8.6–10.5)
CANNABINOIDS SERPL QL: NEGATIVE
CHLORIDE SERPL-SCNC: 107 MMOL/L (ref 98–107)
CLARITY UR: CLEAR
CO2 SERPL-SCNC: 21 MMOL/L (ref 22–29)
COCAINE UR QL: NEGATIVE
COLOR UR: YELLOW
CREAT SERPL-MCNC: 0.95 MG/DL (ref 0.57–1)
DEPRECATED RDW RBC AUTO: 45.5 FL (ref 37–54)
EOSINOPHIL # BLD AUTO: 0.5 10*3/MM3 (ref 0–0.4)
EOSINOPHIL NFR BLD AUTO: 5.5 % (ref 0.3–6.2)
ERYTHROCYTE [DISTWIDTH] IN BLOOD BY AUTOMATED COUNT: 14.2 % (ref 12.3–15.4)
GFR SERPL CREATININE-BSD FRML MDRD: 66 ML/MIN/1.73
GIANT PLATELETS: NORMAL
GLOBULIN UR ELPH-MCNC: 2.6 GM/DL
GLUCOSE SERPL-MCNC: 104 MG/DL (ref 65–99)
GLUCOSE UR STRIP-MCNC: NEGATIVE MG/DL
HCT VFR BLD AUTO: 37.4 % (ref 34–46.6)
HGB BLD-MCNC: 12.3 G/DL (ref 12–15.9)
HGB UR QL STRIP.AUTO: NEGATIVE
HOLD SPECIMEN: NORMAL
HOLD SPECIMEN: NORMAL
KETONES UR QL STRIP: NEGATIVE
LEUKOCYTE ESTERASE UR QL STRIP.AUTO: NEGATIVE
LIPASE SERPL-CCNC: 23 U/L (ref 13–60)
LYMPHOCYTES # BLD AUTO: 2 10*3/MM3 (ref 0.7–3.1)
LYMPHOCYTES NFR BLD AUTO: 22 % (ref 19.6–45.3)
MCH RBC QN AUTO: 29.7 PG (ref 26.6–33)
MCHC RBC AUTO-ENTMCNC: 32.9 G/DL (ref 31.5–35.7)
MCV RBC AUTO: 90.4 FL (ref 79–97)
METHADONE UR QL SCN: NEGATIVE
MONOCYTES # BLD AUTO: 0.4 10*3/MM3 (ref 0.1–0.9)
MONOCYTES NFR BLD AUTO: 4.5 % (ref 5–12)
NEUTROPHILS NFR BLD AUTO: 6.2 10*3/MM3 (ref 1.7–7)
NEUTROPHILS NFR BLD AUTO: 67 % (ref 42.7–76)
NITRITE UR QL STRIP: NEGATIVE
NRBC BLD AUTO-RTO: 1.4 /100 WBC (ref 0–0.2)
OPIATES UR QL: NEGATIVE
OXYCODONE UR QL SCN: NEGATIVE
PH UR STRIP.AUTO: 6 [PH] (ref 5–8)
PLATELET # BLD AUTO: 219 10*3/MM3 (ref 140–450)
PMV BLD AUTO: 9.6 FL (ref 6–12)
POTASSIUM SERPL-SCNC: 3.8 MMOL/L (ref 3.5–5.2)
PROT SERPL-MCNC: 6.4 G/DL (ref 6–8.5)
PROT UR QL STRIP: NEGATIVE
RBC # BLD AUTO: 4.13 10*6/MM3 (ref 3.77–5.28)
RBC MORPH BLD: NORMAL
SODIUM SERPL-SCNC: 141 MMOL/L (ref 136–145)
SP GR UR STRIP: 1.01 (ref 1–1.03)
UROBILINOGEN UR QL STRIP: NORMAL
WBC # BLD AUTO: 9.2 10*3/MM3 (ref 3.4–10.8)
WBC MORPH BLD: NORMAL
WHOLE BLOOD HOLD SPECIMEN: NORMAL
WHOLE BLOOD HOLD SPECIMEN: NORMAL

## 2021-09-28 PROCEDURE — 80307 DRUG TEST PRSMV CHEM ANLYZR: CPT | Performed by: EMERGENCY MEDICINE

## 2021-09-28 PROCEDURE — 0 IOPAMIDOL PER 1 ML: Performed by: EMERGENCY MEDICINE

## 2021-09-28 PROCEDURE — 85007 BL SMEAR W/DIFF WBC COUNT: CPT | Performed by: EMERGENCY MEDICINE

## 2021-09-28 PROCEDURE — 83690 ASSAY OF LIPASE: CPT | Performed by: EMERGENCY MEDICINE

## 2021-09-28 PROCEDURE — 36415 COLL VENOUS BLD VENIPUNCTURE: CPT | Performed by: EMERGENCY MEDICINE

## 2021-09-28 PROCEDURE — 81003 URINALYSIS AUTO W/O SCOPE: CPT | Performed by: EMERGENCY MEDICINE

## 2021-09-28 PROCEDURE — 85025 COMPLETE CBC W/AUTO DIFF WBC: CPT | Performed by: EMERGENCY MEDICINE

## 2021-09-28 PROCEDURE — 99283 EMERGENCY DEPT VISIT LOW MDM: CPT

## 2021-09-28 PROCEDURE — 80053 COMPREHEN METABOLIC PANEL: CPT | Performed by: EMERGENCY MEDICINE

## 2021-09-28 PROCEDURE — 74177 CT ABD & PELVIS W/CONTRAST: CPT

## 2021-09-28 PROCEDURE — P9612 CATHETERIZE FOR URINE SPEC: HCPCS

## 2021-09-28 RX ORDER — SODIUM CHLORIDE 0.9 % (FLUSH) 0.9 %
10 SYRINGE (ML) INJECTION AS NEEDED
Status: DISCONTINUED | OUTPATIENT
Start: 2021-09-28 | End: 2021-09-28 | Stop reason: HOSPADM

## 2021-09-28 RX ADMIN — IOPAMIDOL 100 ML: 755 INJECTION, SOLUTION INTRAVENOUS at 16:37

## 2021-09-28 RX ADMIN — SODIUM CHLORIDE 1000 ML: 9 INJECTION, SOLUTION INTRAVENOUS at 15:37

## 2021-11-01 ENCOUNTER — OFFICE (OUTPATIENT)
Dept: URBAN - METROPOLITAN AREA CLINIC 64 | Facility: CLINIC | Age: 36
End: 2021-11-01
Payer: COMMERCIAL

## 2021-11-01 VITALS
HEIGHT: 66 IN | DIASTOLIC BLOOD PRESSURE: 66 MMHG | WEIGHT: 198 LBS | SYSTOLIC BLOOD PRESSURE: 113 MMHG | HEART RATE: 94 BPM

## 2021-11-01 DIAGNOSIS — R11.2 NAUSEA WITH VOMITING, UNSPECIFIED: ICD-10-CM

## 2021-11-01 DIAGNOSIS — R12 HEARTBURN: ICD-10-CM

## 2021-11-01 PROCEDURE — 99204 OFFICE O/P NEW MOD 45 MIN: CPT | Performed by: INTERNAL MEDICINE

## 2021-11-01 RX ORDER — FAMOTIDINE 40 MG/1
40 TABLET, FILM COATED ORAL
Qty: 30 | Refills: 11 | Status: COMPLETED
Start: 2021-11-01 | End: 2024-04-01

## 2021-11-01 RX ORDER — AZITHROMYCIN 250 MG/1
250 TABLET, FILM COATED ORAL
Qty: 10 | Refills: 0 | Status: COMPLETED
Start: 2021-11-01 | End: 2024-04-01

## 2021-11-11 ENCOUNTER — ANESTHESIA EVENT (OUTPATIENT)
Dept: GASTROENTEROLOGY | Facility: HOSPITAL | Age: 36
End: 2021-11-11

## 2021-11-11 ENCOUNTER — LAB (OUTPATIENT)
Dept: LAB | Facility: HOSPITAL | Age: 36
End: 2021-11-11

## 2021-11-11 LAB — SARS-COV-2 RNA PNL SPEC NAA+PROBE: NOT DETECTED

## 2021-11-11 PROCEDURE — C9803 HOPD COVID-19 SPEC COLLECT: HCPCS

## 2021-11-11 PROCEDURE — 87635 SARS-COV-2 COVID-19 AMP PRB: CPT

## 2021-11-12 ENCOUNTER — ON CAMPUS - OUTPATIENT (OUTPATIENT)
Dept: URBAN - METROPOLITAN AREA HOSPITAL 85 | Facility: HOSPITAL | Age: 36
End: 2021-11-12
Payer: COMMERCIAL

## 2021-11-12 ENCOUNTER — HOSPITAL ENCOUNTER (OUTPATIENT)
Facility: HOSPITAL | Age: 36
Setting detail: HOSPITAL OUTPATIENT SURGERY
Discharge: HOME | End: 2021-11-12
Attending: INTERNAL MEDICINE | Admitting: INTERNAL MEDICINE

## 2021-11-12 ENCOUNTER — ANESTHESIA (OUTPATIENT)
Dept: GASTROENTEROLOGY | Facility: HOSPITAL | Age: 36
End: 2021-11-12

## 2021-11-12 VITALS
BODY MASS INDEX: 32.81 KG/M2 | WEIGHT: 204.15 LBS | TEMPERATURE: 97.6 F | HEART RATE: 72 BPM | HEIGHT: 66 IN | RESPIRATION RATE: 14 BRPM | OXYGEN SATURATION: 96 % | DIASTOLIC BLOOD PRESSURE: 59 MMHG | SYSTOLIC BLOOD PRESSURE: 97 MMHG

## 2021-11-12 DIAGNOSIS — R11.2 NAUSEA WITH VOMITING, UNSPECIFIED: ICD-10-CM

## 2021-11-12 DIAGNOSIS — R12 HEARTBURN: ICD-10-CM

## 2021-11-12 DIAGNOSIS — T18.2XXA FOREIGN BODY IN STOMACH, INITIAL ENCOUNTER: ICD-10-CM

## 2021-11-12 DIAGNOSIS — K20.80 OTHER ESOPHAGITIS WITHOUT BLEEDING: ICD-10-CM

## 2021-11-12 DIAGNOSIS — K31.84 GASTROPARESIS: ICD-10-CM

## 2021-11-12 PROCEDURE — 25010000002 PROPOFOL 200 MG/20ML EMULSION: Performed by: ANESTHESIOLOGY

## 2021-11-12 PROCEDURE — 43235 EGD DIAGNOSTIC BRUSH WASH: CPT | Performed by: INTERNAL MEDICINE

## 2021-11-12 RX ORDER — SODIUM CHLORIDE 0.9 % (FLUSH) 0.9 %
3 SYRINGE (ML) INJECTION EVERY 12 HOURS SCHEDULED
Status: DISCONTINUED | OUTPATIENT
Start: 2021-11-12 | End: 2021-11-12 | Stop reason: HOSPADM

## 2021-11-12 RX ORDER — SODIUM CHLORIDE 0.9 % (FLUSH) 0.9 %
10 SYRINGE (ML) INJECTION AS NEEDED
Status: DISCONTINUED | OUTPATIENT
Start: 2021-11-12 | End: 2021-11-12 | Stop reason: HOSPADM

## 2021-11-12 RX ORDER — PROPOFOL 10 MG/ML
INJECTION, EMULSION INTRAVENOUS AS NEEDED
Status: DISCONTINUED | OUTPATIENT
Start: 2021-11-12 | End: 2021-11-12 | Stop reason: SURG

## 2021-11-12 RX ORDER — ONDANSETRON 2 MG/ML
4 INJECTION INTRAMUSCULAR; INTRAVENOUS ONCE AS NEEDED
Status: DISCONTINUED | OUTPATIENT
Start: 2021-11-12 | End: 2021-11-12 | Stop reason: HOSPADM

## 2021-11-12 RX ORDER — AMITRIPTYLINE HYDROCHLORIDE 75 MG/1
75 TABLET, FILM COATED ORAL NIGHTLY PRN
Status: ON HOLD | COMMUNITY
End: 2022-07-02

## 2021-11-12 RX ORDER — SODIUM CHLORIDE 0.9 % (FLUSH) 0.9 %
3-10 SYRINGE (ML) INJECTION AS NEEDED
Status: DISCONTINUED | OUTPATIENT
Start: 2021-11-12 | End: 2021-11-12 | Stop reason: HOSPADM

## 2021-11-12 RX ORDER — SODIUM CHLORIDE 9 MG/ML
INJECTION, SOLUTION INTRAVENOUS CONTINUOUS PRN
Status: DISCONTINUED | OUTPATIENT
Start: 2021-11-12 | End: 2021-11-12 | Stop reason: SURG

## 2021-11-12 RX ORDER — SODIUM CHLORIDE 0.9 % (FLUSH) 0.9 %
10 SYRINGE (ML) INJECTION EVERY 12 HOURS SCHEDULED
Status: DISCONTINUED | OUTPATIENT
Start: 2021-11-12 | End: 2021-11-12 | Stop reason: HOSPADM

## 2021-11-12 RX ADMIN — SODIUM CHLORIDE: 0.9 INJECTION, SOLUTION INTRAVENOUS at 09:07

## 2021-11-12 RX ADMIN — PROPOFOL 50 MG: 10 INJECTION, EMULSION INTRAVENOUS at 09:10

## 2021-11-12 NOTE — ANESTHESIA PREPROCEDURE EVALUATION
Anesthesia Evaluation     Patient summary reviewed and Nursing notes reviewed   no history of anesthetic complications:  NPO Solid Status: > 8 hours  NPO Liquid Status: > 8 hours           Airway   Mallampati: II  TM distance: >3 FB  Neck ROM: full  No difficulty expected  Dental      Pulmonary - negative pulmonary ROS and normal exam   Cardiovascular - negative cardio ROS and normal exam        Neuro/Psych  (+) psychiatric history Anxiety and Depression,     GI/Hepatic/Renal/Endo    (+) obesity,       Musculoskeletal     (+) back pain,   Abdominal  - normal exam   Substance History - negative use     OB/GYN negative ob/gyn ROS         Other   arthritis,                      Anesthesia Plan    ASA 2     MAC     intravenous induction     Anesthetic plan, all risks, benefits, and alternatives have been provided, discussed and informed consent has been obtained with: patient.

## 2021-11-12 NOTE — ANESTHESIA POSTPROCEDURE EVALUATION
Patient: Renae Tang    Procedure Summary     Date: 11/12/21 Room / Location: [facility] END[facility]COPY 1 / [facility]    Anesthesia Start: 0907 Anesthesia Stop: 0918    Procedure: ESOPHAGOGASTRODUODENOSCOPY (N/A ) Diagnosis:       Nausea & vomiting      Heartburn      (Nausea & vomiting [R11.2])      (Heartburn [R12])    Surgeons: Jung Bob MD Provider: Terrence Martinez MD    Anesthesia Type: MAC ASA Status: 2          Anesthesia Type: MAC    Vitals  Vitals Value Taken Time   BP 97/59 11/12/21 0944   Temp     Pulse 71 11/12/21 0947   Resp 14 11/12/21 0944   SpO2 97 % 11/12/21 0947   Vitals shown include unvalidated device data.        Post Anesthesia Care and Evaluation    Patient location during evaluation: PACU  Patient participation: complete - patient participated  Level of consciousness: awake  Pain scale: See nurse's notes for pain score.  Pain management: adequate  Airway patency: patent  Anesthetic complications: No anesthetic complications  PONV Status: none  Cardiovascular status: acceptable  Respiratory status: acceptable  Hydration status: acceptable    Comments: Patient seen and examined postoperatively; vital signs stable; SpO2 greater than or equal to 90%; cardiopulmonary status stable; nausea/vomiting adequately controlled; pain adequately controlled; no apparent anesthesia complications; patient discharged from anesthesia care when discharge criteria were met

## 2021-11-12 NOTE — OP NOTE
ESOPHAGOGASTRODUODENOSCOPY Procedure Report    Patient Name:  Renae Tang  YOB: 1985    Date of Surgery:  11/12/2021     Pre-Op Diagnosis:  Nausea & vomiting [R11.2]  Heartburn [R12]    Post-Op Diagnosis:  1.  Mild grade a erosive esophagitis  2.  Retained food in the stomach consistent with gastroparesis       Procedure/CPT® Codes:      Procedure(s):  ESOPHAGOGASTRODUODENOSCOPY    Staff:  Surgeon(s):  Jung Bob MD      Anesthesia: Monitored Anesthesia Care    Description of Procedure:  A description of the procedure as well as risks, benefits and alternative methods were explained to the patient who voiced understanding and signed the corresponding consent form. A physical exam was performed and vital signs were monitored throughout the procedure.    After informed consent was obtained, the patient was placed in the left lateral decubitus position and sedated as above.  The Olympus video gastroscope was inserted into the oropharynx and the esophagus was intubated without difficulty.  Examination of the esophagus, stomach, pylorus, duodenal bulb, and second portion of the duodenum was performed without difficulty. The scope was then retroflexed and the fundus was visualized. The procedure was not difficult and there were no immediate complications.  There was no blood loss.    Findings:  Esophagus: Small erosion at the GE junction consistent with mild grade a erosive esophagitis  Stomach: Retained food in the stomach consistent with gastroparesis  Duodenum: Normal    Impression:  1.  Reflux esophagitis  2.  Gastroparesis    Recommendations:  1.  Dexilant 60 mg daily  2.  Treat with azithromycin or erythromycin for gastroparesis symptoms  3.  Follow my office as needed      Jung Bob MD     Date: 11/12/2021    Time: 09:16 EST      .

## 2021-11-12 NOTE — DISCHARGE INSTRUCTIONS
A responsible adult should stay with you and you should rest quietly for the rest of the day.    Do not drink alcohol, drive, operate any heavy machinery or power tools or make any legal/important decisions for the next 24 hours.     Progress your diet as tolerated.  If you begin to experience severe pain, increased shortness of breath, racing heartbeat or a fever above 101 F, seek immediate medical attention.     Follow up with MD as instructed. Call office for results in 3 to 5 days if needed.      Recommendations:  1.  Dexilant 60 mg daily  2.  Treat with azithromycin or erythromycin for gastroparesis symptoms  3.  Follow my office as needed

## 2021-11-12 NOTE — H&P
GI PREOPERATIVE HISTORY AND PHYSICAL:    Referring Provider:    Art Parker MD    Chief complaint: Nausea, vomiting, gastroesophageal reflux disease    Subjective .     History of present illness:      Renae Tang is a 35 y.o. female who presents today for Procedure(s):  ESOPHAGOGASTRODUODENOSCOPY for the indications listed below.     Nausea, vomiting, gastroesophageal reflux disease    The updated Patient Profile was reviewed prior to the procedure, in conjunction with the Physical Exam, including medical conditions, surgical procedures, medications, allergies, family history and social history.     Pre-operatively, I reviewed the indication(s) for the procedure, the risks of the procedure [including but not limited to: unexpected bleeding possibly requiring hospitalization and/or unplanned repeat procedures, perforation possibly requiring surgical treatment, missed lesions and complications of sedation/MAC (also explained by anesthesia staff)].     I have evaluated the patient for risks associated with the planned anesthesia and the procedure to be performed and find the patient an acceptable candidate for IV sedation.    Multiple opportunities were provided for any questions or concerns, and all questions were answered satisfactorily before any anesthesia was administered. We will proceed with the planned procedure.    Past Medical History:  Past Medical History:   Diagnosis Date   • ADHD (attention deficit hyperactivity disorder)    • Anxiety    • Back pain    • DDD (degenerative disc disease), lumbar     mild   • Splenic artery aneurysm (HCC)     coiled since 19       Past Surgical History:  Past Surgical History:   Procedure Laterality Date   • ABDOMINAL SURGERY      coiling of spenic aneurysm   •  SECTION  2016 and 8/2018    x2   • RADIOFREQUENCY ABLATION  2019   • TONSILLECTOMY  2018   • TONSILLECTOMY         Social History:  Social History     Tobacco Use   • Smoking  "status: Never Smoker   • Smokeless tobacco: Never Used   Substance Use Topics   • Alcohol use: No   • Drug use: No       Family History:  Family History   Problem Relation Age of Onset   • Cancer Maternal Grandmother    • Hypertension Paternal Grandmother    • Heart disease Paternal Grandmother    • Depression Sister    • Depression Brother    • Drug abuse Brother        Medications:  No medications prior to admission.       Scheduled Meds:  Continuous Infusions:No current facility-administered medications for this encounter.    PRN Meds:.    ALLERGIES:  Paxil [paroxetine]    ROS:  The following systems were reviewed and negative;   Constitution:  No fevers, chills, no unintentional weight loss  Skin: no rash, no jaundice  Eyes:  No blurry vision, no eye pain  HENT:  No change in hearing or smell  Resp:  No dyspnea or cough  CV:  No chest pain or palpitations  :  No dysuria, hematuria  Musculoskeletal:  No leg cramps or arthralgias  Neuro:  No tremor, no numbness  Psych:  No depression or confsuion    Objective     Vital Signs:   Vitals:    11/09/21 0848   Weight: 89.8 kg (198 lb)   Height: 167.6 cm (66\")       Physical Exam:       General Appearance:    Awake and alert, in no acute distress   Head:    Normocephalic, without obvious abnormality, atraumatic   Throat:   No oral lesions, no thrush, oral mucosa moist   Lungs  Cardiac:  Abdomen:  Extremities:     Respirations regular, even and unlabored    Regular rate and rhythm, no murmur, gallop, rub    Non-distended, good bowel sounds, non tender, no masses     No edema, pulses 2+   Skin:   No rash, no jaundice       Results Review:  Lab Results (last 24 hours)     ** No results found for the last 24 hours. **          Imaging Results (Last 24 Hours)     ** No results found for the last 24 hours. **           I reviewed the patient's labs and imaging.    ASSESSMENT AND PLAN:  Nausea, vomiting, gastroesophageal reflux disease    Active Problems:    * No active " hospital problems. *       Procedure(s):  ESOPHAGOGASTRODUODENOSCOPY      I discussed the patients findings and my recommendations with the patient.    Jung Bob MD  11/12/21  07:07 EST

## 2022-02-21 ENCOUNTER — OFFICE VISIT (OUTPATIENT)
Dept: ENDOCRINOLOGY | Facility: CLINIC | Age: 37
End: 2022-02-21

## 2022-02-21 VITALS
HEART RATE: 97 BPM | WEIGHT: 206 LBS | HEIGHT: 66 IN | BODY MASS INDEX: 33.11 KG/M2 | TEMPERATURE: 97.3 F | DIASTOLIC BLOOD PRESSURE: 68 MMHG | SYSTOLIC BLOOD PRESSURE: 100 MMHG | OXYGEN SATURATION: 100 %

## 2022-02-21 DIAGNOSIS — N92.6 IRREGULAR PERIODS/MENSTRUAL CYCLES: ICD-10-CM

## 2022-02-21 DIAGNOSIS — R79.89 ABNORMAL LIVER FUNCTION TEST: ICD-10-CM

## 2022-02-21 DIAGNOSIS — R63.5 WEIGHT GAIN: ICD-10-CM

## 2022-02-21 DIAGNOSIS — R94.6 ABNORMAL THYROID FUNCTION TEST: Primary | ICD-10-CM

## 2022-02-21 PROBLEM — J45.909 ASTHMA: Status: ACTIVE | Noted: 2021-08-26

## 2022-02-21 PROBLEM — I47.1 SUPRAVENTRICULAR TACHYCARDIA (HCC): Status: ACTIVE | Noted: 2021-08-20

## 2022-02-21 PROBLEM — R74.8 ABNORMAL LIVER ENZYMES: Status: ACTIVE | Noted: 2021-08-26

## 2022-02-21 PROBLEM — R14.0 ABDOMINAL BLOATING: Status: ACTIVE | Noted: 2021-08-20

## 2022-02-21 PROBLEM — F41.9 RECURRENT ANXIETY: Status: ACTIVE | Noted: 2021-08-26

## 2022-02-21 PROBLEM — I47.10 SUPRAVENTRICULAR TACHYCARDIA: Status: ACTIVE | Noted: 2021-08-20

## 2022-02-21 PROBLEM — F32.A DEPRESSIVE DISORDER: Status: ACTIVE | Noted: 2021-08-20

## 2022-02-21 PROBLEM — E55.9 VITAMIN D DEFICIENCY: Status: ACTIVE | Noted: 2021-08-20

## 2022-02-21 PROBLEM — E78.2 MIXED HYPERLIPIDEMIA: Status: ACTIVE | Noted: 2021-08-26

## 2022-02-21 PROBLEM — U07.1 COVID-19: Status: ACTIVE | Noted: 2021-08-26

## 2022-02-21 PROBLEM — M54.50 LOW BACK PAIN: Status: ACTIVE | Noted: 2021-08-20

## 2022-02-21 PROBLEM — F33.9 RECURRENT MAJOR DEPRESSION (HCC): Status: ACTIVE | Noted: 2021-08-26

## 2022-02-21 PROBLEM — I72.8 ANEURYSM OF SPLENIC ARTERY (HCC): Status: ACTIVE | Noted: 2021-08-20

## 2022-02-21 PROBLEM — F50.819 BINGE EATING DISORDER: Status: ACTIVE | Noted: 2021-08-20

## 2022-02-21 PROBLEM — F50.81 BINGE EATING DISORDER: Status: ACTIVE | Noted: 2021-08-20

## 2022-02-21 PROBLEM — R53.83 FATIGUE: Status: ACTIVE | Noted: 2021-08-26

## 2022-02-21 PROBLEM — R73.9 HYPERGLYCEMIA: Status: ACTIVE | Noted: 2021-08-26

## 2022-02-21 PROBLEM — R00.2 PALPITATIONS: Status: ACTIVE | Noted: 2021-08-26

## 2022-02-21 PROCEDURE — 99204 OFFICE O/P NEW MOD 45 MIN: CPT | Performed by: INTERNAL MEDICINE

## 2022-02-21 RX ORDER — PSEUDOEPHEDRINE HCL 30 MG
TABLET ORAL
COMMUNITY
End: 2022-02-21

## 2022-02-21 RX ORDER — DOLUTEGRAVIR SODIUM 50 MG/1
TABLET, FILM COATED ORAL
COMMUNITY
End: 2022-02-21

## 2022-02-21 RX ORDER — TOPIRAMATE 50 MG/1
50 TABLET, FILM COATED ORAL 2 TIMES DAILY
COMMUNITY
Start: 2022-02-10 | End: 2022-02-21

## 2022-02-21 RX ORDER — FAMOTIDINE 40 MG/1
40 TABLET, FILM COATED ORAL
COMMUNITY
Start: 2022-02-10 | End: 2023-01-03

## 2022-02-21 RX ORDER — GABAPENTIN 100 MG/1
CAPSULE ORAL
COMMUNITY
End: 2022-02-21

## 2022-02-21 RX ORDER — MAGNESIUM CITRATE
SOLUTION, ORAL ORAL
COMMUNITY
End: 2022-02-21

## 2022-02-21 RX ORDER — EMTRICITABINE AND TENOFOVIR DISOPROXIL FUMARATE 200; 300 MG/1; MG/1
TABLET, FILM COATED ORAL
COMMUNITY
End: 2022-02-21

## 2022-02-21 RX ORDER — VILAZODONE HYDROCHLORIDE 20 MG/1
TABLET ORAL
COMMUNITY
End: 2022-02-21

## 2022-02-21 RX ORDER — POLYETHYLENE GLYCOL 3350 17 G/17G
POWDER, FOR SOLUTION ORAL
COMMUNITY
End: 2022-02-21

## 2022-02-21 RX ORDER — MEDROXYPROGESTERONE ACETATE 5 MG/1
10 TABLET ORAL 2 TIMES DAILY
COMMUNITY
Start: 2021-12-26 | End: 2023-01-03

## 2022-02-21 RX ORDER — DEXAMETHASONE 1 MG
1 TABLET ORAL ONCE
Qty: 1 TABLET | Refills: 0 | Status: SHIPPED | OUTPATIENT
Start: 2022-02-21 | End: 2022-02-21

## 2022-02-21 RX ORDER — MEDROXYPROGESTERONE ACETATE 10 MG/1
TABLET ORAL
COMMUNITY
Start: 2022-02-02 | End: 2022-04-15 | Stop reason: SDUPTHER

## 2022-02-21 RX ORDER — TIZANIDINE 4 MG/1
TABLET ORAL
COMMUNITY
End: 2022-02-21

## 2022-02-21 RX ORDER — AMOXICILLIN AND CLAVULANATE POTASSIUM 875; 125 MG/1; MG/1
TABLET, FILM COATED ORAL
COMMUNITY
End: 2022-02-21

## 2022-02-21 RX ORDER — ONDANSETRON 4 MG/1
TABLET, ORALLY DISINTEGRATING ORAL
COMMUNITY
End: 2022-02-21

## 2022-02-21 RX ORDER — UREA 10 %
LOTION (ML) TOPICAL
COMMUNITY
End: 2022-02-21

## 2022-02-21 RX ORDER — MAGNESIUM 200 MG
1000 TABLET ORAL DAILY
Qty: 100 EACH | Refills: 4 | Status: SHIPPED | OUTPATIENT
Start: 2022-02-21

## 2022-02-21 RX ORDER — FAMOTIDINE 20 MG/1
TABLET, FILM COATED ORAL
COMMUNITY
End: 2022-02-21

## 2022-02-21 RX ORDER — AZITHROMYCIN 250 MG/1
TABLET, FILM COATED ORAL
COMMUNITY
End: 2022-02-21

## 2022-02-21 NOTE — PATIENT INSTRUCTIONS
Please start vitamin B12 at least 1000 mcg sublingual daily  Please continue rest of the medication including vitamin D  Please get your all labs done fasting and needs to be drawn before 10 AM.  Please arrange for dexamethasone suppression test by taking 1 mg of dexamethasone at 11 PM on a given night and draw serum cortisol next morning at 8 AM.

## 2022-02-21 NOTE — PROGRESS NOTES
Endocrine Consult Outpatient  Referred by Dr. Parker for thyroid consultation  Patient Care Team:  Art Parker MD as PCP - General (Family Medicine)     Chief Complaint: Thyroid issues        HPI: This is a 36-year-old female with history of anxiety disorder has been having issues with excessive fatigue and tiredness she has gained about 40 pounds of weight in last 6 months, she has memory issues and that she is referred here for evaluation management.  She was found to have low free T4 back in August 2021.  She is not taking any thyroid medications at this time.  Never taken any thyroid medications in the past either.    She has total of 12 pregnancies, 8 live births and last year she had 4 miscarriages.  She was diagnosed with COVID October 2020.    Menstrual cycles are irregular.     Vitamin D deficiency: She tells me that she is taking vitamin D 50,000 units once a week.    Past Medical History:   Diagnosis Date   • ADHD (attention deficit hyperactivity disorder)    • Anxiety    • Back pain    • DDD (degenerative disc disease), lumbar     mild   • Hyperlipidemia    • Hypothyroidism    • Splenic artery aneurysm (HCC)     coiled since 9/17/21       Social History     Socioeconomic History   • Marital status:    Tobacco Use   • Smoking status: Never Smoker   • Smokeless tobacco: Never Used   Vaping Use   • Vaping Use: Never used   Substance and Sexual Activity   • Alcohol use: No   • Drug use: No   • Sexual activity: Defer       Family History   Problem Relation Age of Onset   • Cancer Maternal Grandmother    • Hypertension Paternal Grandmother    • Heart disease Paternal Grandmother    • Hyperlipidemia Paternal Grandmother    • Depression Sister    • Depression Brother    • Drug abuse Brother        Allergies   Allergen Reactions   • Atorvastatin Other (See Comments)   • Paxil [Paroxetine] Unknown - Low Severity     EPS   • Latex Rash       ROS:   Constitutional:  Admit fatigue, tiredness.  Admit weight gain.    Eyes:  Denies change in visual acuity   HENT:  Denies nasal congestion or sore throat   Respiratory: denies cough, shortness of breath.   Cardiovascular:  denies chest pain, edema   GI:  Denies abdominal pain, nausea, vomiting.    :  Denies dysuria   Musculoskeletal:  Denies back pain or joint pain   Integument:  Denies dry skin, rash   Neurologic:  Denies headache, focal weakness or sensory changes   Endocrine:  Denies polyuria or polydipsia   Psychiatric:  Denies depression, admit anxiety      Vitals:    02/21/22 0851   BP: 100/68   Pulse: 97   Temp: 97.3 °F (36.3 °C)   SpO2: 100%     Body mass index is 33.25 kg/m².      Physical Exam:  GEN: NAD, conversant, obese  EYES: EOMI, PERRL, no conjunctival erythema  NECK: no thyromegaly, full ROM   CV: RRR, no murmurs/rubs/gallops, no peripheral edema  LUNG: CTAB, no wheezes/rales/ronchi  SKIN: no rashes, no acanthosis  MSK: no deformities, full ROM of all extremities  NEURO: no tremors, DTR normal  PSYCH: AOX3, appropriate mood, affect normal      Results Review:     I reviewed the patient's new clinical results.    Lab Results   Component Value Date    GLUCOSE 104 (H) 09/28/2021    BUN 12 09/28/2021    CREATININE 0.95 09/28/2021    EGFRIFNONA 66 09/28/2021    BCR 12.6 09/28/2021    K 3.8 09/28/2021    CO2 21.0 (L) 09/28/2021    CALCIUM 7.4 (L) 09/28/2021    ALBUMIN 3.80 09/28/2021    LABIL2 0.9 (L) 07/19/2018    AST 18 09/28/2021    ALT 17 09/28/2021     No results found for: HGBA1C  Lab Results   Component Value Date    CREATININE 0.95 09/28/2021     Comprehensive Metabolic Panel (09/28/2021 16:17)     Labs from August 10 of 2021 showed a sodium 137, potassium 5.0, chloride 100, CO2 26, BUN 17, creatinine 1.1, glucose 95, ,   Total cholesterol was 285 with LDL cholesterol of 205, HDL 48 and triglycerides 183  TSH was 2.1 with free T4 1.56 and free T3 was 2.98.    Medication Review: Reviewed.       Current Outpatient Medications:    •  amitriptyline (ELAVIL) 75 MG tablet, Take 75 mg by mouth At Night As Needed for Sleep., Disp: , Rfl:   •  clonazePAM (KlonoPIN) 1 MG tablet, Take 1 mg by mouth As Needed., Disp: , Rfl:   •  famotidine (PEPCID) 40 MG tablet, Take 40 mg by mouth every night at bedtime., Disp: , Rfl:   •  medroxyPROGESTERone (PROVERA) 10 MG tablet, TAKE ONE TABLET BY MOUTH DAILY; FOR TEN DAYS EACH MONTH, Disp: , Rfl:   •  medroxyPROGESTERone (PROVERA) 5 MG tablet, Take 10 mg by mouth 2 (Two) Times a Day., Disp: , Rfl:   •  propranolol (INDERAL) 10 MG tablet, Take 1 tablet by mouth 2 (Two) Times a Day As Needed (anxiety)., Disp: 60 tablet, Rfl: 2  •  Vyvanse 50 MG capsule, Take 50 mg by mouth As Needed  , Disp: , Rfl:     Assessment/Plan   1.  Abnormal thyroid function test: Her free T4 was low back in August 2021: She does have excessive fatigue and tiredness with memory dysfunction as well as weight gain, will recheck TSH with free T4, free T3 and TPO antibodies and then make further recommendations.    2.  Weight gain: We will do DEXA suppression test.    3.  Irregular menstrual cycles: We will check testosterone both total and free along with LH, FSH, SHBG and prolactin level.    4.  Abnormal liver function test: We will check CMP and CBC along with hepatitis panel.           Wili Banks MD FACE.

## 2022-02-23 ENCOUNTER — LAB (OUTPATIENT)
Dept: LAB | Facility: HOSPITAL | Age: 37
End: 2022-02-23

## 2022-02-23 DIAGNOSIS — R63.5 WEIGHT GAIN: ICD-10-CM

## 2022-02-23 DIAGNOSIS — R94.6 ABNORMAL THYROID FUNCTION TEST: ICD-10-CM

## 2022-02-23 DIAGNOSIS — R79.89 ABNORMAL LIVER FUNCTION TEST: ICD-10-CM

## 2022-02-23 DIAGNOSIS — N92.6 IRREGULAR PERIODS/MENSTRUAL CYCLES: ICD-10-CM

## 2022-02-23 LAB
25(OH)D3 SERPL-MCNC: 33.2 NG/ML (ref 30–100)
ALBUMIN SERPL-MCNC: 4.6 G/DL (ref 3.5–5.2)
ALBUMIN/GLOB SERPL: 1.5 G/DL
ALP SERPL-CCNC: 68 U/L (ref 39–117)
ALT SERPL W P-5'-P-CCNC: 7 U/L (ref 1–33)
ANION GAP SERPL CALCULATED.3IONS-SCNC: 11.3 MMOL/L (ref 5–15)
AST SERPL-CCNC: 14 U/L (ref 1–32)
BASOPHILS # BLD AUTO: 0.05 10*3/MM3 (ref 0–0.2)
BASOPHILS NFR BLD AUTO: 0.6 % (ref 0–1.5)
BILIRUB SERPL-MCNC: 0.2 MG/DL (ref 0–1.2)
BUN SERPL-MCNC: 15 MG/DL (ref 6–20)
BUN/CREAT SERPL: 14.9 (ref 7–25)
CALCIUM SPEC-SCNC: 9 MG/DL (ref 8.6–10.5)
CHLORIDE SERPL-SCNC: 104 MMOL/L (ref 98–107)
CO2 SERPL-SCNC: 20.7 MMOL/L (ref 22–29)
CORTIS SERPL-MCNC: 0.47 MCG/DL
CORTIS SERPL-MCNC: 0.51 MCG/DL
CREAT SERPL-MCNC: 1.01 MG/DL (ref 0.57–1)
DEPRECATED RDW RBC AUTO: 45.9 FL (ref 37–54)
EOSINOPHIL # BLD AUTO: 0.11 10*3/MM3 (ref 0–0.4)
EOSINOPHIL NFR BLD AUTO: 1.3 % (ref 0.3–6.2)
ERYTHROCYTE [DISTWIDTH] IN BLOOD BY AUTOMATED COUNT: 13.8 % (ref 12.3–15.4)
FSH SERPL-ACNC: 2.98 MIU/ML
GFR SERPL CREATININE-BSD FRML MDRD: 61 ML/MIN/1.73
GLOBULIN UR ELPH-MCNC: 3 GM/DL
GLUCOSE SERPL-MCNC: 96 MG/DL (ref 65–99)
HBV SURFACE AB SER RIA-ACNC: REACTIVE
HBV SURFACE AG SERPL QL IA: NORMAL
HCT VFR BLD AUTO: 36.8 % (ref 34–46.6)
HCV AB SER DONR QL: NORMAL
HGB BLD-MCNC: 12 G/DL (ref 12–15.9)
IMM GRANULOCYTES # BLD AUTO: 0.03 10*3/MM3 (ref 0–0.05)
IMM GRANULOCYTES NFR BLD AUTO: 0.3 % (ref 0–0.5)
LH SERPL-ACNC: 7.17 MIU/ML
LYMPHOCYTES # BLD AUTO: 1.68 10*3/MM3 (ref 0.7–3.1)
LYMPHOCYTES NFR BLD AUTO: 19.5 % (ref 19.6–45.3)
MCH RBC QN AUTO: 29.5 PG (ref 26.6–33)
MCHC RBC AUTO-ENTMCNC: 32.6 G/DL (ref 31.5–35.7)
MCV RBC AUTO: 90.4 FL (ref 79–97)
MONOCYTES # BLD AUTO: 0.29 10*3/MM3 (ref 0.1–0.9)
MONOCYTES NFR BLD AUTO: 3.4 % (ref 5–12)
NEUTROPHILS NFR BLD AUTO: 6.44 10*3/MM3 (ref 1.7–7)
NEUTROPHILS NFR BLD AUTO: 74.9 % (ref 42.7–76)
NRBC BLD AUTO-RTO: 0 /100 WBC (ref 0–0.2)
PLATELET # BLD AUTO: 248 10*3/MM3 (ref 140–450)
PMV BLD AUTO: 11.1 FL (ref 6–12)
POTASSIUM SERPL-SCNC: 4.1 MMOL/L (ref 3.5–5.2)
PROLACTIN SERPL-MCNC: 11.4 NG/ML (ref 4.79–23.3)
PROT SERPL-MCNC: 7.6 G/DL (ref 6–8.5)
RBC # BLD AUTO: 4.07 10*6/MM3 (ref 3.77–5.28)
SODIUM SERPL-SCNC: 136 MMOL/L (ref 136–145)
T3FREE SERPL-MCNC: 2.85 PG/ML (ref 2–4.4)
T4 FREE SERPL-MCNC: 0.94 NG/DL (ref 0.93–1.7)
TSH SERPL DL<=0.05 MIU/L-ACNC: 1.39 UIU/ML (ref 0.27–4.2)
VIT B12 BLD-MCNC: 1879 PG/ML (ref 211–946)
WBC NRBC COR # BLD: 8.6 10*3/MM3 (ref 3.4–10.8)

## 2022-02-23 PROCEDURE — 83001 ASSAY OF GONADOTROPIN (FSH): CPT

## 2022-02-23 PROCEDURE — 86704 HEP B CORE ANTIBODY TOTAL: CPT

## 2022-02-23 PROCEDURE — 80050 GENERAL HEALTH PANEL: CPT

## 2022-02-23 PROCEDURE — 84439 ASSAY OF FREE THYROXINE: CPT

## 2022-02-23 PROCEDURE — 36415 COLL VENOUS BLD VENIPUNCTURE: CPT

## 2022-02-23 PROCEDURE — 84270 ASSAY OF SEX HORMONE GLOBUL: CPT

## 2022-02-23 PROCEDURE — 82533 TOTAL CORTISOL: CPT

## 2022-02-23 PROCEDURE — 84402 ASSAY OF FREE TESTOSTERONE: CPT

## 2022-02-23 PROCEDURE — 86706 HEP B SURFACE ANTIBODY: CPT

## 2022-02-23 PROCEDURE — 84403 ASSAY OF TOTAL TESTOSTERONE: CPT

## 2022-02-23 PROCEDURE — 83002 ASSAY OF GONADOTROPIN (LH): CPT

## 2022-02-23 PROCEDURE — 87340 HEPATITIS B SURFACE AG IA: CPT

## 2022-02-23 PROCEDURE — 86803 HEPATITIS C AB TEST: CPT

## 2022-02-23 PROCEDURE — 82306 VITAMIN D 25 HYDROXY: CPT

## 2022-02-23 PROCEDURE — 84481 FREE ASSAY (FT-3): CPT

## 2022-02-23 PROCEDURE — 84146 ASSAY OF PROLACTIN: CPT

## 2022-02-23 PROCEDURE — 82607 VITAMIN B-12: CPT

## 2022-02-23 PROCEDURE — 86376 MICROSOMAL ANTIBODY EACH: CPT

## 2022-02-24 ENCOUNTER — PATIENT ROUNDING (BHMG ONLY) (OUTPATIENT)
Dept: ENDOCRINOLOGY | Facility: CLINIC | Age: 37
End: 2022-02-24

## 2022-02-24 LAB
SHBG SERPL-SCNC: 37.3 NMOL/L (ref 24.6–122)
THYROPEROXIDASE AB SERPL-ACNC: <8 IU/ML (ref 0–34)

## 2022-02-25 LAB
HBV CORE AB SERPL QL IA: NEGATIVE
TESTOST FREE SERPL-MCNC: 0.3 PG/ML (ref 0–4.2)
TESTOST SERPL-MCNC: <3 NG/DL (ref 8–60)

## 2022-04-05 RX ORDER — CHOLECALCIFEROL (VITAMIN D3) 25 MCG
TABLET,CHEWABLE ORAL
COMMUNITY
Start: 2022-02-21 | End: 2022-04-15

## 2022-04-05 RX ORDER — DEXAMETHASONE 1 MG
TABLET ORAL
COMMUNITY
Start: 2022-02-21 | End: 2022-04-15

## 2022-04-05 RX ORDER — LEVOFLOXACIN 500 MG/1
TABLET, FILM COATED ORAL
COMMUNITY
Start: 2022-02-21 | End: 2022-04-15

## 2022-04-15 ENCOUNTER — OFFICE VISIT (OUTPATIENT)
Dept: ENDOCRINOLOGY | Facility: CLINIC | Age: 37
End: 2022-04-15

## 2022-04-15 VITALS
SYSTOLIC BLOOD PRESSURE: 110 MMHG | OXYGEN SATURATION: 97 % | HEART RATE: 77 BPM | BODY MASS INDEX: 31.02 KG/M2 | WEIGHT: 193 LBS | DIASTOLIC BLOOD PRESSURE: 70 MMHG | TEMPERATURE: 97.1 F | HEIGHT: 66 IN

## 2022-04-15 DIAGNOSIS — R94.6 ABNORMAL THYROID FUNCTION TEST: ICD-10-CM

## 2022-04-15 DIAGNOSIS — R63.5 WEIGHT GAIN: ICD-10-CM

## 2022-04-15 DIAGNOSIS — R53.83 OTHER FATIGUE: Primary | ICD-10-CM

## 2022-04-15 PROCEDURE — 99213 OFFICE O/P EST LOW 20 MIN: CPT | Performed by: INTERNAL MEDICINE

## 2022-04-15 RX ORDER — EPINEPHRINE 0.3 MG/.3ML
INJECTION SUBCUTANEOUS SEE ADMIN INSTRUCTIONS
COMMUNITY
Start: 2022-03-15

## 2022-04-15 RX ORDER — DULOXETIN HYDROCHLORIDE 20 MG/1
20 CAPSULE, DELAYED RELEASE ORAL
COMMUNITY
Start: 2022-03-25 | End: 2023-01-03

## 2022-04-15 NOTE — PROGRESS NOTES
Endocrine Progress Note Outpatient     Patient Care Team:  Art Parker MD as PCP - General (Family Medicine)    Chief Complaint: Follow-up abnormal thyroid test and fatigue and weight gain          HPI: This is a 36-year-old female with anxiety disorder seen here back in February for excessive fatigue and tiredness with weight gain of about 40 pounds in the last 6 months.  She did have COVID diagnosis back in October 2020 and she does not believe that she is recovered completely.  She is not taking any thyroid medications at this time.  Does have history of 8 live births with 12 pregnancies.  She tells me her diet is pretty healthy, she drinks water and does not have excessive amount of processed foods.  She tells me that she does not struggle with sleep, she goes to bed late and has to wake up early.    Past Medical History:   Diagnosis Date   • ADHD (attention deficit hyperactivity disorder)    • Anxiety    • Back pain    • DDD (degenerative disc disease), lumbar     mild   • Hyperlipidemia    • Hypothyroidism    • Splenic artery aneurysm (HCC)     coiled since 9/17/21       Social History     Socioeconomic History   • Marital status:    • Number of children: 8   • Highest education level: Master's degree (e.g., MA, MS, Jaclyn, MEd, MSW, VENKAT)   Tobacco Use   • Smoking status: Never Smoker   • Smokeless tobacco: Never Used   Vaping Use   • Vaping Use: Never used   Substance and Sexual Activity   • Alcohol use: No   • Drug use: No   • Sexual activity: Defer       Family History   Problem Relation Age of Onset   • Cancer Maternal Grandmother    • Hypertension Paternal Grandmother    • Heart disease Paternal Grandmother    • Hyperlipidemia Paternal Grandmother    • Depression Sister    • Depression Brother    • Drug abuse Brother        Allergies   Allergen Reactions   • Atorvastatin Other (See Comments)   • Paxil [Paroxetine] Unknown - Low Severity     EPS   • Latex Rash       ROS:    Constitutional:  Admit fatigue, tiredness.    Eyes:  Denies change in visual acuity   HENT:  Denies nasal congestion or sore throat   Respiratory: denies cough, shortness of breath.   Cardiovascular:  denies chest pain, edema   GI:  Denies abdominal pain, nausea, vomiting.   Musculoskeletal:  Denies back pain or joint pain   Integument:  Denies dry skin and rash   Neurologic:  Denies headache, focal weakness or sensory changes   Endocrine:  Denies polyuria or polydipsia   Psychiatric:  Denies depression or anxiety      Vitals:    04/15/22 0809   BP: 110/70   Pulse: 77   Temp: 97.1 °F (36.2 °C)   SpO2: 97%     Body mass index is 31.15 kg/m².     Physical Exam:  GEN: NAD, conversant  EYES: EOMI, PERRL, no conjunctival erythema  NECK: no thyromegaly, full ROM   CV: RRR, no murmurs/rubs/gallops, no peripheral edema  LUNG: CTAB, no wheezes/rales/ronchi  SKIN: no rashes, no acanthosis  MSK: no deformities, full ROM of all extremities  NEURO: no tremors, DTR normal  PSYCH: AOX3, appropriate mood, affect normal      Results Review:     I reviewed the patient's new clinical results.    No results found for: HGBA1C   Lab Results   Component Value Date    GLUCOSE 96 02/23/2022    BUN 15 02/23/2022    CREATININE 1.01 (H) 02/23/2022    EGFRIFNONA 61 02/23/2022    BCR 14.9 02/23/2022    K 4.1 02/23/2022    CO2 20.7 (L) 02/23/2022    CALCIUM 9.0 02/23/2022    ALBUMIN 4.60 02/23/2022    LABIL2 0.9 (L) 07/19/2018    AST 14 02/23/2022    ALT 7 02/23/2022     Lab Results   Component Value Date    TSH 1.390 02/23/2022    FREET4 0.94 02/23/2022    THYROIDAB <8 02/23/2022         Medication Review: Reviewed.       Current Outpatient Medications:   •  amitriptyline (ELAVIL) 75 MG tablet, Take 75 mg by mouth At Night As Needed for Sleep., Disp: , Rfl:   •  clonazePAM (KlonoPIN) 1 MG tablet, Take 1 mg by mouth As Needed., Disp: , Rfl:   •  Cyanocobalamin (Vitamin B-12) 1000 MCG sublingual tablet, Place 1 tablet under the tongue Daily.,  Disp: 100 each, Rfl: 4  •  DULoxetine (CYMBALTA) 20 MG capsule, Take 20 mg by mouth every night at bedtime., Disp: , Rfl:   •  EPINEPHrine (EPIPEN) 0.3 MG/0.3ML solution auto-injector injection, See Admin Instructions., Disp: , Rfl:   •  famotidine (PEPCID) 40 MG tablet, Take 40 mg by mouth every night at bedtime., Disp: , Rfl:   •  medroxyPROGESTERone (PROVERA) 5 MG tablet, Take 10 mg by mouth 2 (Two) Times a Day., Disp: , Rfl:   •  propranolol (INDERAL) 10 MG tablet, Take 1 tablet by mouth 2 (Two) Times a Day As Needed (anxiety)., Disp: 60 tablet, Rfl: 2  •  Vyvanse 50 MG capsule, Take 50 mg by mouth As Needed  , Disp: , Rfl:       Assessment/Plan   Excessive fatigue and tiredness: So far her dexamethasone suppression test, CBC, CMP, TSH and free T4 has been normal.  Vitamin D and B12 is normal 2.  Recommend to pay attention to her sleep, get good 8-hour sleep and maybe add some structured physical activity as tolerated.  She is working on her diet already.    Weight gain: She has lost about 13 pounds since last seen.  Encouraged to continue to work on diet and activity.    Abnormal thyroid function test: Repeat TSH with free T4 and free T3 normal.            Wili Banks MD FACE.

## 2022-04-15 NOTE — PATIENT INSTRUCTIONS
Please continue to work on your diet and activity  Please consider adding structured physical activity as tolerated  Continue to pay attention to your sleep  Follow-up as needed.

## 2022-07-02 ENCOUNTER — HOSPITAL ENCOUNTER (OUTPATIENT)
Facility: HOSPITAL | Age: 37
Setting detail: OBSERVATION
Discharge: HOME | End: 2022-07-03
Attending: EMERGENCY MEDICINE | Admitting: EMERGENCY MEDICINE

## 2022-07-02 ENCOUNTER — APPOINTMENT (OUTPATIENT)
Dept: CT IMAGING | Facility: HOSPITAL | Age: 37
End: 2022-07-02

## 2022-07-02 ENCOUNTER — APPOINTMENT (OUTPATIENT)
Dept: GENERAL RADIOLOGY | Facility: HOSPITAL | Age: 37
End: 2022-07-02

## 2022-07-02 DIAGNOSIS — R55 SYNCOPE, UNSPECIFIED SYNCOPE TYPE: ICD-10-CM

## 2022-07-02 DIAGNOSIS — R55 SYNCOPE AND COLLAPSE: ICD-10-CM

## 2022-07-02 DIAGNOSIS — E86.0 DEHYDRATION: Primary | ICD-10-CM

## 2022-07-02 LAB
ABO GROUP BLD: NORMAL
ALBUMIN SERPL-MCNC: 4.7 G/DL (ref 3.5–5.2)
ALBUMIN/GLOB SERPL: 1.5 G/DL
ALP SERPL-CCNC: 59 U/L (ref 39–117)
ALT SERPL W P-5'-P-CCNC: 14 U/L (ref 1–33)
AMPHET+METHAMPHET UR QL: POSITIVE
ANION GAP SERPL CALCULATED.3IONS-SCNC: 14 MMOL/L (ref 5–15)
AST SERPL-CCNC: 20 U/L (ref 1–32)
BACTERIA UR QL AUTO: ABNORMAL /HPF
BARBITURATES UR QL SCN: NEGATIVE
BASOPHILS # BLD AUTO: 0.1 10*3/MM3 (ref 0–0.2)
BASOPHILS NFR BLD AUTO: 0.8 % (ref 0–1.5)
BENZODIAZ UR QL SCN: NEGATIVE
BILIRUB SERPL-MCNC: 0.3 MG/DL (ref 0–1.2)
BILIRUB UR QL STRIP: NEGATIVE
BLD GP AB SCN SERPL QL: NEGATIVE
BUN SERPL-MCNC: 18 MG/DL (ref 6–20)
BUN/CREAT SERPL: 17.8 (ref 7–25)
CALCIUM SPEC-SCNC: 8.9 MG/DL (ref 8.6–10.5)
CANNABINOIDS SERPL QL: NEGATIVE
CHLORIDE SERPL-SCNC: 101 MMOL/L (ref 98–107)
CK SERPL-CCNC: 56 U/L (ref 20–180)
CLARITY UR: CLEAR
CO2 SERPL-SCNC: 24 MMOL/L (ref 22–29)
COCAINE UR QL: NEGATIVE
COLOR UR: YELLOW
CREAT SERPL-MCNC: 1.01 MG/DL (ref 0.57–1)
DEPRECATED RDW RBC AUTO: 45.5 FL (ref 37–54)
EGFRCR SERPLBLD CKD-EPI 2021: 73.2 ML/MIN/1.73
EOSINOPHIL # BLD AUTO: 0.5 10*3/MM3 (ref 0–0.4)
EOSINOPHIL NFR BLD AUTO: 5.5 % (ref 0.3–6.2)
ERYTHROCYTE [DISTWIDTH] IN BLOOD BY AUTOMATED COUNT: 14.5 % (ref 12.3–15.4)
ETHANOL UR QL: <0.01 %
GLOBULIN UR ELPH-MCNC: 3.1 GM/DL
GLUCOSE SERPL-MCNC: 102 MG/DL (ref 65–99)
GLUCOSE UR STRIP-MCNC: NEGATIVE MG/DL
HCG SERPL QL: NEGATIVE
HCT VFR BLD AUTO: 40.6 % (ref 34–46.6)
HGB BLD-MCNC: 13.3 G/DL (ref 12–15.9)
HGB UR QL STRIP.AUTO: ABNORMAL
HYALINE CASTS UR QL AUTO: ABNORMAL /LPF
KETONES UR QL STRIP: NEGATIVE
LEUKOCYTE ESTERASE UR QL STRIP.AUTO: NEGATIVE
LIPASE SERPL-CCNC: 28 U/L (ref 13–60)
LYMPHOCYTES # BLD AUTO: 2.5 10*3/MM3 (ref 0.7–3.1)
LYMPHOCYTES NFR BLD AUTO: 27.7 % (ref 19.6–45.3)
MAGNESIUM SERPL-MCNC: 2.1 MG/DL (ref 1.6–2.6)
MCH RBC QN AUTO: 29.4 PG (ref 26.6–33)
MCHC RBC AUTO-ENTMCNC: 32.8 G/DL (ref 31.5–35.7)
MCV RBC AUTO: 89.7 FL (ref 79–97)
METHADONE UR QL SCN: NEGATIVE
MONOCYTES # BLD AUTO: 0.5 10*3/MM3 (ref 0.1–0.9)
MONOCYTES NFR BLD AUTO: 5.9 % (ref 5–12)
NEUTROPHILS NFR BLD AUTO: 5.4 10*3/MM3 (ref 1.7–7)
NEUTROPHILS NFR BLD AUTO: 60.1 % (ref 42.7–76)
NITRITE UR QL STRIP: NEGATIVE
NRBC BLD AUTO-RTO: 0 /100 WBC (ref 0–0.2)
NT-PROBNP SERPL-MCNC: 65.7 PG/ML (ref 0–450)
OPIATES UR QL: NEGATIVE
OXYCODONE UR QL SCN: NEGATIVE
PH UR STRIP.AUTO: 8 [PH] (ref 5–8)
PLATELET # BLD AUTO: 248 10*3/MM3 (ref 140–450)
PMV BLD AUTO: 9.1 FL (ref 6–12)
POTASSIUM SERPL-SCNC: 3.6 MMOL/L (ref 3.5–5.2)
PROT SERPL-MCNC: 7.8 G/DL (ref 6–8.5)
PROT UR QL STRIP: ABNORMAL
RBC # BLD AUTO: 4.53 10*6/MM3 (ref 3.77–5.28)
RBC # UR STRIP: ABNORMAL /HPF
REF LAB TEST METHOD: ABNORMAL
RH BLD: POSITIVE
SARS-COV-2 RNA PNL SPEC NAA+PROBE: NOT DETECTED
SODIUM SERPL-SCNC: 139 MMOL/L (ref 136–145)
SP GR UR STRIP: 1.01 (ref 1–1.03)
SQUAMOUS #/AREA URNS HPF: ABNORMAL /HPF
T&S EXPIRATION DATE: NORMAL
TROPONIN T SERPL-MCNC: <0.01 NG/ML (ref 0–0.03)
TROPONIN T SERPL-MCNC: <0.01 NG/ML (ref 0–0.03)
TSH SERPL DL<=0.05 MIU/L-ACNC: 1.53 UIU/ML (ref 0.27–4.2)
UROBILINOGEN UR QL STRIP: ABNORMAL
WBC # UR STRIP: ABNORMAL /HPF
WBC NRBC COR # BLD: 8.9 10*3/MM3 (ref 3.4–10.8)
WHOLE BLOOD HOLD COAG: NORMAL
WHOLE BLOOD HOLD COAG: NORMAL
WHOLE BLOOD HOLD SPECIMEN: NORMAL

## 2022-07-02 PROCEDURE — 86850 RBC ANTIBODY SCREEN: CPT | Performed by: EMERGENCY MEDICINE

## 2022-07-02 PROCEDURE — 83735 ASSAY OF MAGNESIUM: CPT | Performed by: EMERGENCY MEDICINE

## 2022-07-02 PROCEDURE — 83690 ASSAY OF LIPASE: CPT | Performed by: EMERGENCY MEDICINE

## 2022-07-02 PROCEDURE — 80307 DRUG TEST PRSMV CHEM ANLYZR: CPT | Performed by: EMERGENCY MEDICINE

## 2022-07-02 PROCEDURE — 84703 CHORIONIC GONADOTROPIN ASSAY: CPT | Performed by: EMERGENCY MEDICINE

## 2022-07-02 PROCEDURE — 93005 ELECTROCARDIOGRAM TRACING: CPT | Performed by: EMERGENCY MEDICINE

## 2022-07-02 PROCEDURE — 86900 BLOOD TYPING SEROLOGIC ABO: CPT | Performed by: EMERGENCY MEDICINE

## 2022-07-02 PROCEDURE — 99284 EMERGENCY DEPT VISIT MOD MDM: CPT

## 2022-07-02 PROCEDURE — 81001 URINALYSIS AUTO W/SCOPE: CPT | Performed by: EMERGENCY MEDICINE

## 2022-07-02 PROCEDURE — 86901 BLOOD TYPING SEROLOGIC RH(D): CPT | Performed by: EMERGENCY MEDICINE

## 2022-07-02 PROCEDURE — 36415 COLL VENOUS BLD VENIPUNCTURE: CPT | Performed by: EMERGENCY MEDICINE

## 2022-07-02 PROCEDURE — 82077 ASSAY SPEC XCP UR&BREATH IA: CPT | Performed by: EMERGENCY MEDICINE

## 2022-07-02 PROCEDURE — G0378 HOSPITAL OBSERVATION PER HR: HCPCS

## 2022-07-02 PROCEDURE — 80050 GENERAL HEALTH PANEL: CPT | Performed by: EMERGENCY MEDICINE

## 2022-07-02 PROCEDURE — 96361 HYDRATE IV INFUSION ADD-ON: CPT

## 2022-07-02 PROCEDURE — 84484 ASSAY OF TROPONIN QUANT: CPT | Performed by: PHYSICIAN ASSISTANT

## 2022-07-02 PROCEDURE — 83880 ASSAY OF NATRIURETIC PEPTIDE: CPT | Performed by: EMERGENCY MEDICINE

## 2022-07-02 PROCEDURE — 70450 CT HEAD/BRAIN W/O DYE: CPT

## 2022-07-02 PROCEDURE — 84484 ASSAY OF TROPONIN QUANT: CPT | Performed by: EMERGENCY MEDICINE

## 2022-07-02 PROCEDURE — 82550 ASSAY OF CK (CPK): CPT | Performed by: EMERGENCY MEDICINE

## 2022-07-02 PROCEDURE — C9803 HOPD COVID-19 SPEC COLLECT: HCPCS

## 2022-07-02 PROCEDURE — 71045 X-RAY EXAM CHEST 1 VIEW: CPT

## 2022-07-02 PROCEDURE — 87635 SARS-COV-2 COVID-19 AMP PRB: CPT | Performed by: EMERGENCY MEDICINE

## 2022-07-02 RX ORDER — ONDANSETRON 4 MG/1
4 TABLET, FILM COATED ORAL EVERY 6 HOURS PRN
Status: DISCONTINUED | OUTPATIENT
Start: 2022-07-02 | End: 2022-07-03 | Stop reason: HOSPADM

## 2022-07-02 RX ORDER — ASPIRIN 81 MG/1
81 TABLET, CHEWABLE ORAL DAILY
COMMUNITY

## 2022-07-02 RX ORDER — ONDANSETRON 2 MG/ML
4 INJECTION INTRAMUSCULAR; INTRAVENOUS EVERY 6 HOURS PRN
Status: DISCONTINUED | OUTPATIENT
Start: 2022-07-02 | End: 2022-07-03 | Stop reason: HOSPADM

## 2022-07-02 RX ORDER — SODIUM CHLORIDE, SODIUM LACTATE, POTASSIUM CHLORIDE, CALCIUM CHLORIDE 600; 310; 30; 20 MG/100ML; MG/100ML; MG/100ML; MG/100ML
75 INJECTION, SOLUTION INTRAVENOUS CONTINUOUS
Status: DISCONTINUED | OUTPATIENT
Start: 2022-07-02 | End: 2022-07-03 | Stop reason: HOSPADM

## 2022-07-02 RX ORDER — SODIUM CHLORIDE 0.9 % (FLUSH) 0.9 %
10 SYRINGE (ML) INJECTION AS NEEDED
Status: DISCONTINUED | OUTPATIENT
Start: 2022-07-02 | End: 2022-07-03 | Stop reason: HOSPADM

## 2022-07-02 RX ORDER — SODIUM CHLORIDE 0.9 % (FLUSH) 0.9 %
10 SYRINGE (ML) INJECTION EVERY 12 HOURS SCHEDULED
Status: DISCONTINUED | OUTPATIENT
Start: 2022-07-02 | End: 2022-07-03 | Stop reason: HOSPADM

## 2022-07-02 RX ADMIN — SODIUM CHLORIDE, POTASSIUM CHLORIDE, SODIUM LACTATE AND CALCIUM CHLORIDE 1000 ML: 600; 310; 30; 20 INJECTION, SOLUTION INTRAVENOUS at 15:02

## 2022-07-02 RX ADMIN — SODIUM CHLORIDE 1000 ML: 9 INJECTION, SOLUTION INTRAVENOUS at 12:43

## 2022-07-02 RX ADMIN — SODIUM CHLORIDE, POTASSIUM CHLORIDE, SODIUM LACTATE AND CALCIUM CHLORIDE 125 ML/HR: 600; 310; 30; 20 INJECTION, SOLUTION INTRAVENOUS at 18:02

## 2022-07-02 NOTE — H&P
[facility] Observation Unit H&P    Patient Name: Renae Tang  : 1985  MRN: [id number]  Primary Care Physician: rAt Parker MD  Date of admission: 2022     Patient Care Team:  Art Parker MD as PCP - General (Family Medicine)          Subjective   History Present Illness     Chief Complaint:   Chief Complaint   Patient presents with   • Syncope       Obtained from admitting physician HPI on 2022:  36-year-old female states she had syncopal episode this morning while she was standing.  She reports having dry mouth and feeling lightheaded and near syncopal and having had postural syncopal episodes over the last couple of weeks.  She reports no abrupt headaches or focal numbness or weakness or chest pain or shortness of breath.  She reports no fevers or chills or vomiting or diarrhea     2022 (postobservation admission): Patient Chang HPI noted above including presently 1 week history of multiple episodes of lightheadedness which occur often time with position changes but can occur even while at rest with 2 distinct episodes of syncopal episodes the second which was witnessed by her son who note some mild jerking movements but no obvious seizure-like activity.  Episodes are noted is lasting only a short duration with no postictal period following them.  Initially she notes that over the past 1 to 2 weeks she has had profound increase in thirst though no change in urination or hunger that she has noted.  No dyspnea, cough, fever, nausea or vomiting, peripheral edema or diaphoresis are present.  Patient does not smoke or drink alcohol and does not note any recent changes to medications.      Review of Systems   Constitutional: Negative.   HENT: Negative.    Eyes: Negative.    Cardiovascular: Positive for near-syncope and syncope. Negative for chest pain, dyspnea on exertion, irregular heartbeat, leg swelling and palpitations.   Respiratory: Negative.    Endocrine: Positive for  polydipsia. Negative for polyphagia and polyuria.   Skin: Negative.    Musculoskeletal: Negative.    Gastrointestinal: Negative.    Genitourinary: Negative.    Neurological: Positive for light-headedness.   Psychiatric/Behavioral: Negative.            Personal History     Past Medical History:   Past Medical History:   Diagnosis Date   • ADHD (attention deficit hyperactivity disorder)    • Anxiety    • Back pain    • DDD (degenerative disc disease), lumbar     mild   • Hyperlipidemia    • Hypothyroidism    • Splenic artery aneurysm (HCC)     coiled since 19       Surgical History:      Past Surgical History:   Procedure Laterality Date   • ABDOMINAL SURGERY      coiling of spenic aneurysm   •  SECTION  2016 and 8/2018    x2   • ENDOSCOPY N/A 2021    Procedure: ESOPHAGOGASTRODUODENOSCOPY;  Surgeon: Jung Bob MD;  [facility];  Service: Gastroenterology;  Laterality: N/A;  reflux, esophagitis, gastroparesis   • LAPAROSCOPIC SPLENECTOMY      artery aneurysym repair   • RADIOFREQUENCY ABLATION  2019   • TONSILLECTOMY  2018   • TONSILLECTOMY             Family History: family history includes Cancer in her maternal grandmother; Depression in her brother and sister; Drug abuse in her brother; Heart disease in her paternal grandmother; Hyperlipidemia in her paternal grandmother; Hypertension in her paternal grandmother. Otherwise pertinent FHx was reviewed and unremarkable.     Social History:  reports that she has never smoked. She has never used smokeless tobacco. She reports that she does not drink alcohol and does not use drugs.      Medications:  Prior to Admission medications    Medication Sig Start Date End Date Taking? Authorizing Provider   amitriptyline (ELAVIL) 75 MG tablet Take 75 mg by mouth At Night As Needed for Sleep.    Damari Sifuentes MD   clonazePAM (KlonoPIN) 1 MG tablet Take 1 mg by mouth As Needed. 21   Damari Sifuentes MD    Cyanocobalamin (Vitamin B-12) 1000 MCG sublingual tablet Place 1 tablet under the tongue Daily. 2/21/22   Wili Banks MD   DULoxetine (CYMBALTA) 20 MG capsule Take 20 mg by mouth every night at bedtime. 3/25/22   Damari Sifuentes MD   EPINEPHrine (EPIPEN) 0.3 MG/0.3ML solution auto-injector injection See Admin Instructions. 3/15/22   Damari Sifuentes MD   famotidine (PEPCID) 40 MG tablet Take 40 mg by mouth every night at bedtime. 2/10/22   Damari Sifuentes MD   medroxyPROGESTERone (PROVERA) 5 MG tablet Take 10 mg by mouth 2 (Two) Times a Day. 12/26/21   Damari Sifuentes MD   propranolol (INDERAL) 10 MG tablet Take 1 tablet by mouth 2 (Two) Times a Day As Needed (anxiety). 8/23/21   Ashley Meeks PA-C   Vyvanse 50 MG capsule Take 50 mg by mouth As Needed   8/20/21   Damari Sifuentes MD       Allergies:    Allergies   Allergen Reactions   • Atorvastatin Other (See Comments)   • Paxil [Paroxetine] Unknown - Low Severity     EPS   • Latex Rash       Objective   Objective     Vital Signs  Temp:  [98 °F (36.7 °C)] 98 °F (36.7 °C)  Heart Rate:  [57-90] 63  Resp:  [16] 16  BP: (83-94)/(53-65) 87/55  SpO2:  [94 %-100 %] 100 %  on   ;   Device (Oxygen Therapy): room air  Body mass index is 29.05 kg/m².    Physical Exam  Vitals reviewed.   Constitutional:       General: She is not in acute distress.     Appearance: Normal appearance. She is normal weight. She is not ill-appearing, toxic-appearing or diaphoretic.   HENT:      Head: Normocephalic.      Right Ear: External ear normal.      Left Ear: External ear normal.      Nose: Nose normal.      Mouth/Throat:      Mouth: Mucous membranes are moist.   Eyes:      Extraocular Movements: Extraocular movements intact.   Cardiovascular:      Rate and Rhythm: Normal rate and regular rhythm.      Pulses: Normal pulses.   Pulmonary:      Effort: Pulmonary effort is normal.      Breath sounds: Normal breath sounds.   Abdominal:      General: Bowel  sounds are normal.      Palpations: Abdomen is soft.   Musculoskeletal:      Cervical back: Normal range of motion.      Right lower leg: No edema.      Left lower leg: No edema.   Skin:     General: Skin is warm and dry.      Capillary Refill: Capillary refill takes less than 2 seconds.   Neurological:      General: No focal deficit present.      Mental Status: She is alert.   Psychiatric:         Mood and Affect: Mood normal.         Behavior: Behavior normal.         Thought Content: Thought content normal.         Judgment: Judgment normal.           Results Review:  I have personally reviewed most recent cardiac tracings, lab results and radiology images and interpretations and agree with findings, most notably: Troponin, CK, proBNP, CMP, CBC, urine pregnancy test, lipase, magnesium, TSH, UA, urine tox, CT of head, chest x-ray and EKG.    Results from last 7 days   Lab Units 07/02/22  1246   WBC 10*3/mm3 8.90   HEMOGLOBIN g/dL 13.3   HEMATOCRIT % 40.6   PLATELETS 10*3/mm3 248     Results from last 7 days   Lab Units 07/02/22  1246   SODIUM mmol/L 139   POTASSIUM mmol/L 3.6   CHLORIDE mmol/L 101   CO2 mmol/L 24.0   BUN mg/dL 18   CREATININE mg/dL 1.01*   GLUCOSE mg/dL 102*   CALCIUM mg/dL 8.9   ALT (SGPT) U/L 14   AST (SGOT) U/L 20   TROPONIN T ng/mL <0.010   PROBNP pg/mL 65.7     Estimated Creatinine Clearance: 81.3 mL/min (A) (by C-G formula based on SCr of 1.01 mg/dL (H)).  Brief Urine Lab Results  (Last result in the past 365 days)      Color   Clarity   Blood   Leuk Est   Nitrite   Protein   CREAT   Urine HCG        07/02/22 1306 Yellow   Clear   Moderate (2+)   Negative   Negative   Trace                 Microbiology Results (last 10 days)     Procedure Component Value - Date/Time    COVID-19,CEPHEID/DWAINE,COR/SHANITA/PAD/JULIETA IN-HOUSE(OR EMERGENT/ADD-ON),NP SWAB IN TRANSPORT MEDIA 3-4 HR TAT, RT-PCR - Swab, Nasopharynx [[id number]]  (Normal) Collected: 07/02/22 1247    Lab Status: Final result Specimen: Swab  from Nasopharynx Updated: 07/02/22 1315     COVID19 Not Detected    Narrative:      Fact sheet for providers: https://www.fda.gov/media/643559/download     Fact sheet for patients: https://www.fda.gov/media/099530/download  Fact sheet for providers: https://www.fda.gov/media/216693/download    Fact sheet for patients: https://www.fda.gov/media/873450/download    Test performed by PCR.          ECG/EMG Results (most recent)     Procedure Component Value Units Date/Time    ECG 12 Lead [[id number]] Collected: 07/02/22 1150     Updated: 07/02/22 1151     QT Interval 397 ms     Narrative:      HEART RATE= 72  bpm  RR Interval= 840  ms  RI Interval= 161  ms  P Horizontal Axis= 35  deg  P Front Axis= 26  deg  QRSD Interval= 88  ms  QT Interval= 397  ms  QRS Axis= 53  deg  T Wave Axis= 33  deg  - NORMAL ECG -  Sinus rhythm  When compared with ECG of 02-Nov-2020 16:52:26,  Significant rate decrease  Electronically Signed By:   Date and Time of Study: 2022-07-02 11:50:19                  CT Head Without Contrast    Result Date: 7/2/2022  No acute intracranial abnormality. Brain MRI is more sensitive to evaluate for acute or subacute infarcts and to evaluate for intracranial metastatic disease.  Electronically Signed By-Marsha Ladd MD On:7/2/2022 2:04 PM This report was finalized on [id number] by  Marsha Ladd MD.    XR Chest 1 View    Result Date: 7/2/2022  No acute cardiopulmonary abnormality.  Electronically Signed By-Marsha Ladd MD On:7/2/2022 1:21 PM This report was finalized on [id number] by  Marsha Ladd MD.        Estimated Creatinine Clearance: 81.3 mL/min (A) (by C-G formula based on SCr of 1.01 mg/dL (H)).    Assessment & Plan   Assessment/Plan       Active Hospital Problems    Diagnosis  POA   • Syncope [R55]  Yes      Resolved Hospital Problems   No resolved problems to display.     Syncope  -EKG shows sinus rhythm at 72 without obvious acute ST changes or ectopy and a QTC of 433 ms  -CT of head shows no  acute intracranial abnormalities  -Troponin: Less than 0.010  -proBNP: 65.7  -TSH: 1.530 with a normal magnesium of 2.1  -UA shows 0-2 WBCs, RBCs, trace protein and 2+ blood but appears to be contaminated with 3 gastric squamous epithelial cells, urine tox positive for amphetamines  -In the ED patient given 2 L fluid bolus  -Echocardiogram ordered  -Continue cardiac monitoring    MARTI  -Mild with an elevated serum creatinine of 1.01 and a BUN of 18 (likely prerenal)  -Patient given 2 L fluid bolus in ED  -Monitor BMP while admitted    Anxiety/depression  -Continue home meds once med rec verified    GERD  -Famotidine        VTE Prophylaxis -   Mechanical Order History:     None      Pharmalogical Order History:     None          CODE STATUS:    There are no questions and answers to display.       This patient has been examined wearing personal protective equipment.     I discussed the patient's findings and my recommendations with patient and nursing staff.      Signature:Electronically signed by Andrea Alford PA-C, 07/02/22, 4:44 PM EDT.

## 2022-07-02 NOTE — PLAN OF CARE
Problem: Fall Injury Risk  Goal: Absence of Fall and Fall-Related Injury  Outcome: Ongoing, Progressing  Intervention: Identify and Manage Contributors  Recent Flowsheet Documentation  Taken 7/2/2022 1737 by Rubi Roy RN  Medication Review/Management: medications reviewed  Intervention: Promote Injury-Free Environment  Recent Flowsheet Documentation  Taken 7/2/2022 1737 by Rubi Roy, RN  Safety Promotion/Fall Prevention: safety round/check completed   Goal Outcome Evaluation:  Plan of Care Reviewed With: patient        Progress: no change  Outcome Evaluation: New admit for syncope. Pt c/o dizziness when sttanding up. Will ctm.

## 2022-07-02 NOTE — ED PROVIDER NOTES
Subjective   History of Present Illness  Syncope  36-year-old female states she had syncopal episode this morning while she was standing.  She reports having dry mouth and feeling lightheaded and near syncopal and having had postural syncopal episodes over the last couple of weeks.  She reports no abrupt headaches or focal numbness or weakness or chest pain or shortness of breath.  She reports no fevers or chills or vomiting or diarrhea.  Review of Systems   Constitutional: Negative for fever.   HENT: Negative.    Eyes: Negative.    Respiratory: Negative.    Cardiovascular: Negative.    Gastrointestinal: Negative.    Genitourinary: Negative.    Musculoskeletal: Negative.    Skin: Negative.    Neurological: Positive for syncope and light-headedness. Negative for headaches.   Psychiatric/Behavioral: Negative.        Past Medical History:   Diagnosis Date   • ADHD (attention deficit hyperactivity disorder)    • Anxiety    • Back pain    • DDD (degenerative disc disease), lumbar     mild   • Hyperlipidemia    • Hypothyroidism    • Splenic artery aneurysm (HCC)     coiled since 19       Allergies   Allergen Reactions   • Atorvastatin Other (See Comments)   • Paxil [Paroxetine] Unknown - Low Severity     EPS   • Latex Rash       Past Surgical History:   Procedure Laterality Date   • ABDOMINAL SURGERY      coiling of spenic aneurysm   •  SECTION  2016 and 8/2018    x2   • ENDOSCOPY N/A 2021    Procedure: ESOPHAGOGASTRODUODENOSCOPY;  Surgeon: Jung Bob MD;  Location: [facility] ENDOSCOPY;  Service: Gastroenterology;  Laterality: N/A;  reflux, esophagitis, gastroparesis   • LAPAROSCOPIC SPLENECTOMY      artery aneurysym repair   • RADIOFREQUENCY ABLATION  2019   • TONSILLECTOMY  2018   • TONSILLECTOMY         Family History   Problem Relation Age of Onset   • Cancer Maternal Grandmother    • Hypertension Paternal Grandmother    • Heart disease Paternal Grandmother    • Hyperlipidemia  "Paternal Grandmother    • Depression Sister    • Depression Brother    • Drug abuse Brother        Social History     Socioeconomic History   • Marital status:    • Number of children: 8   • Highest education level: Master's degree (e.g., MA, MS, Jaclyn, MEd, MSW, VENKAT)   Tobacco Use   • Smoking status: Never Smoker   • Smokeless tobacco: Never Used   Vaping Use   • Vaping Use: Never used   Substance and Sexual Activity   • Alcohol use: No   • Drug use: No   • Sexual activity: Defer       Prior to Admission medications    Medication Sig Start Date End Date Taking? Authorizing Provider   amitriptyline (ELAVIL) 75 MG tablet Take 75 mg by mouth At Night As Needed for Sleep.    Damari Sifuentes MD   clonazePAM (KlonoPIN) 1 MG tablet Take 1 mg by mouth As Needed. 8/20/21   Damari Sifuentes MD   Cyanocobalamin (Vitamin B-12) 1000 MCG sublingual tablet Place 1 tablet under the tongue Daily. 2/21/22   Wili Banks MD   DULoxetine (CYMBALTA) 20 MG capsule Take 20 mg by mouth every night at bedtime. 3/25/22   Damari Sifuentes MD   EPINEPHrine (EPIPEN) 0.3 MG/0.3ML solution auto-injector injection See Admin Instructions. 3/15/22   Damari Sifuetnes MD   famotidine (PEPCID) 40 MG tablet Take 40 mg by mouth every night at bedtime. 2/10/22   Damari Sifuentes MD   medroxyPROGESTERone (PROVERA) 5 MG tablet Take 10 mg by mouth 2 (Two) Times a Day. 12/26/21   Damari Sifuentes MD   propranolol (INDERAL) 10 MG tablet Take 1 tablet by mouth 2 (Two) Times a Day As Needed (anxiety). 8/23/21   Ashley Meeks PA-C   Vyvanse 50 MG capsule Take 50 mg by mouth As Needed   8/20/21   Damari Sifuentes MD     BP (!) 83/53   Pulse 64   Temp 98 °F (36.7 °C) (Oral)   Resp 16   Ht 167.6 cm (66\")   Wt 81.6 kg (180 lb)   LMP 06/26/2022   SpO2 98%   BMI 29.05 kg/m²   I examined the patient using the appropriate personal protective equipment.        Objective   Physical Exam  General: Well-developed " well-appearing, no acute distress, alert and appropriate  Eyes: Pupils round and reactive, sclera nonicteric  HEENT: Mucous membranes dry, no mucosal swelling  Neck: Supple, no nuchal rigidity,   Respirations: Respirations nonlabored, equal breath sounds bilaterally, clear lungs  Heart regular rate and rhythm, no murmurs rubs or gallops,   Abdomen soft nontender nondistended, no hepatosplenomegaly, no hernia, no mass, normal bowel sounds, no CVA tenderness  Extremities no clubbing cyanosis or edema, calves are symmetric and nontender  Neuro cranial nerves II through XII intact , normal sensory/motor function and strength in four extremities, no slurred speech, no facial droop, normal finger to nose, normal heel to shin, no nuchal rigidity  Psych oriented, cooperative  Skin no rash, brisk cap refill  Procedures           ED Course      Results for orders placed or performed during the hospital encounter of 07/02/22   COVID-19,CEPHEID/DWAINE,COR/SHANITA/PAD/JULIETA IN-HOUSE(OR EMERGENT/ADD-ON),NP SWAB IN TRANSPORT MEDIA 3-4 HR TAT, RT-PCR - Swab, Nasopharynx    Specimen: Nasopharynx; Swab   Result Value Ref Range    COVID19 Not Detected Not Detected - Ref. Range   Comprehensive Metabolic Panel    Specimen: Blood   Result Value Ref Range    Glucose 102 (H) 65 - 99 mg/dL    BUN 18 6 - 20 mg/dL    Creatinine 1.01 (H) 0.57 - 1.00 mg/dL    Sodium 139 136 - 145 mmol/L    Potassium 3.6 3.5 - 5.2 mmol/L    Chloride 101 98 - 107 mmol/L    CO2 24.0 22.0 - 29.0 mmol/L    Calcium 8.9 8.6 - 10.5 mg/dL    Total Protein 7.8 6.0 - 8.5 g/dL    Albumin 4.70 3.50 - 5.20 g/dL    ALT (SGPT) 14 1 - 33 U/L    AST (SGOT) 20 1 - 32 U/L    Alkaline Phosphatase 59 39 - 117 U/L    Total Bilirubin 0.3 0.0 - 1.2 mg/dL    Globulin 3.1 gm/dL    A/G Ratio 1.5 g/dL    BUN/Creatinine Ratio 17.8 7.0 - 25.0    Anion Gap 14.0 5.0 - 15.0 mmol/L    eGFR 73.2 >60.0 mL/min/1.73   Lipase    Specimen: Blood   Result Value Ref Range    Lipase 28 13 - 60 U/L   hCG, Serum,  Qualitative    Specimen: Blood   Result Value Ref Range    HCG Qualitative Negative Negative   Urinalysis With Culture If Indicated - Urine, Clean Catch    Specimen: Urine, Clean Catch   Result Value Ref Range    Color, UA Yellow Yellow, Straw    Appearance, UA Clear Clear    pH, UA 8.0 5.0 - 8.0    Specific Gravity, UA 1.009 1.005 - 1.030    Glucose, UA Negative Negative    Ketones, UA Negative Negative    Bilirubin, UA Negative Negative    Blood, UA Moderate (2+) (A) Negative    Protein, UA Trace (A) Negative    Leuk Esterase, UA Negative Negative    Nitrite, UA Negative Negative    Urobilinogen, UA 0.2 E.U./dL 0.2 - 1.0 E.U./dL   Troponin    Specimen: Blood   Result Value Ref Range    Troponin T <0.010 0.000 - 0.030 ng/mL   BNP    Specimen: Blood   Result Value Ref Range    proBNP 65.7 0.0 - 450.0 pg/mL   Ethanol    Specimen: Blood   Result Value Ref Range    Ethanol % <0.010 %   Urine Drug Screen - Urine, Clean Catch    Specimen: Urine, Clean Catch   Result Value Ref Range    Amphet/Methamphet, Screen Positive (A) Negative    Barbiturates Screen, Urine Negative Negative    Benzodiazepine Screen, Urine Negative Negative    Cocaine Screen, Urine Negative Negative    Opiate Screen Negative Negative    THC, Screen, Urine Negative Negative    Methadone Screen, Urine Negative Negative    Oxycodone Screen, Urine Negative Negative   CK    Specimen: Blood   Result Value Ref Range    Creatine Kinase 56 20 - 180 U/L   Magnesium    Specimen: Blood   Result Value Ref Range    Magnesium 2.1 1.6 - 2.6 mg/dL   TSH    Specimen: Blood   Result Value Ref Range    TSH 1.530 0.270 - 4.200 uIU/mL   CBC Auto Differential    Specimen: Blood   Result Value Ref Range    WBC 8.90 3.40 - 10.80 10*3/mm3    RBC 4.53 3.77 - 5.28 10*6/mm3    Hemoglobin 13.3 12.0 - 15.9 g/dL    Hematocrit 40.6 34.0 - 46.6 %    MCV 89.7 79.0 - 97.0 fL    MCH 29.4 26.6 - 33.0 pg    MCHC 32.8 31.5 - 35.7 g/dL    RDW 14.5 12.3 - 15.4 %    RDW-SD 45.5 37.0 - 54.0 fl     MPV 9.1 6.0 - 12.0 fL    Platelets 248 140 - 450 10*3/mm3    Neutrophil % 60.1 42.7 - 76.0 %    Lymphocyte % 27.7 19.6 - 45.3 %    Monocyte % 5.9 5.0 - 12.0 %    Eosinophil % 5.5 0.3 - 6.2 %    Basophil % 0.8 0.0 - 1.5 %    Neutrophils, Absolute 5.40 1.70 - 7.00 10*3/mm3    Lymphocytes, Absolute 2.50 0.70 - 3.10 10*3/mm3    Monocytes, Absolute 0.50 0.10 - 0.90 10*3/mm3    Eosinophils, Absolute 0.50 (H) 0.00 - 0.40 10*3/mm3    Basophils, Absolute 0.10 0.00 - 0.20 10*3/mm3    nRBC 0.0 0.0 - 0.2 /100 WBC   Urinalysis, Microscopic Only - Urine, Clean Catch    Specimen: Urine, Clean Catch   Result Value Ref Range    RBC, UA 0-2 (A) None Seen /HPF    WBC, UA 0-2 (A) None Seen /HPF    Bacteria, UA None Seen None Seen /HPF    Squamous Epithelial Cells, UA 3-6 (A) None Seen, 0-2 /HPF    Hyaline Casts, UA 0-2 None Seen /LPF    Methodology Automated Microscopy    ECG 12 Lead   Result Value Ref Range    QT Interval 397 ms   Type & Screen    Specimen: Blood   Result Value Ref Range    ABO Type A     RH type Positive     Antibody Screen Negative     T&S Expiration Date 7/5/2022 11:59:59 PM    Light Blue Top   Result Value Ref Range    Extra Tube Hold for add-ons.      CT Head Without Contrast    Result Date: 7/2/2022  No acute intracranial abnormality. Brain MRI is more sensitive to evaluate for acute or subacute infarcts and to evaluate for intracranial metastatic disease.  Electronically Signed By-Marsha Ladd MD On:7/2/2022 2:04 PM This report was finalized on [id number] by  Marsha Ladd MD.    XR Chest 1 View    Result Date: 7/2/2022  No acute cardiopulmonary abnormality.  Electronically Signed By-Marsha Ladd MD On:7/2/2022 1:21 PM This report was finalized on [id number] by  Marsha Ladd MD.                                         MDM  My EKG interpretation sinus rhythm rate of 72, no acute ST or T wave abnormality.  Patient presents having had a syncopal episode today and states she has had other syncopal episodes  over the last few weeks feeling lightheaded when she stands.  She did have some orthostatic hypotension on today's evaluation.  She was ordered some IV fluid she does appear dehydrated with dry mucous membranes.  Her laboratory work-up was essentially normal.  Notably she is in no respiratory distress.  Is having no chest pain or shortness of breath.  EKG shows no ischemic change, her initial troponin is normal.  Patient was resting comfortably on reexamination she has a benign abdominal exam with no tenderness, she has no focal neurologic deficits to suggest stroke.  She will be placed in hospital observation for further monitoring, IV fluids and echocardiogram.  Final diagnoses:   Dehydration   Syncope, unspecified syncope type       ED Disposition  ED Disposition     ED Disposition   Decision to Admit    Condition   --    Comment   --             No follow-up provider specified.       Medication List      No changes were made to your prescriptions during this visit.          Bismark Stevens MD  07/02/22 8675

## 2022-07-02 NOTE — ED NOTES
"Pt states has been not feeling well for a few weeks now. Pt states that she has been \"passed out\" multiple times this week.   "

## 2022-07-03 ENCOUNTER — APPOINTMENT (OUTPATIENT)
Dept: CARDIOLOGY | Facility: HOSPITAL | Age: 37
End: 2022-07-03

## 2022-07-03 ENCOUNTER — APPOINTMENT (OUTPATIENT)
Dept: RESPIRATORY THERAPY | Facility: HOSPITAL | Age: 37
End: 2022-07-03

## 2022-07-03 VITALS
TEMPERATURE: 98.1 F | RESPIRATION RATE: 16 BRPM | SYSTOLIC BLOOD PRESSURE: 97 MMHG | OXYGEN SATURATION: 99 % | HEIGHT: 66 IN | HEART RATE: 80 BPM | WEIGHT: 185 LBS | BODY MASS INDEX: 29.73 KG/M2 | DIASTOLIC BLOOD PRESSURE: 65 MMHG

## 2022-07-03 LAB
ANION GAP SERPL CALCULATED.3IONS-SCNC: 10 MMOL/L (ref 5–15)
BASOPHILS # BLD AUTO: 0.1 10*3/MM3 (ref 0–0.2)
BASOPHILS NFR BLD AUTO: 0.8 % (ref 0–1.5)
BUN SERPL-MCNC: 12 MG/DL (ref 6–20)
BUN/CREAT SERPL: 15.8 (ref 7–25)
CALCIUM SPEC-SCNC: 7.6 MG/DL (ref 8.6–10.5)
CHLORIDE SERPL-SCNC: 111 MMOL/L (ref 98–107)
CO2 SERPL-SCNC: 22 MMOL/L (ref 22–29)
CREAT SERPL-MCNC: 0.76 MG/DL (ref 0.57–1)
DEPRECATED RDW RBC AUTO: 46.4 FL (ref 37–54)
EGFRCR SERPLBLD CKD-EPI 2021: 103 ML/MIN/1.73
EOSINOPHIL # BLD AUTO: 0.5 10*3/MM3 (ref 0–0.4)
EOSINOPHIL NFR BLD AUTO: 7.3 % (ref 0.3–6.2)
ERYTHROCYTE [DISTWIDTH] IN BLOOD BY AUTOMATED COUNT: 14.6 % (ref 12.3–15.4)
GLUCOSE SERPL-MCNC: 98 MG/DL (ref 65–99)
HCT VFR BLD AUTO: 29.6 % (ref 34–46.6)
HCT VFR BLD AUTO: 32.1 % (ref 34–46.6)
HCT VFR BLD AUTO: 32.5 % (ref 34–46.6)
HGB BLD-MCNC: 10.5 G/DL (ref 12–15.9)
HGB BLD-MCNC: 10.7 G/DL (ref 12–15.9)
HGB BLD-MCNC: 9.7 G/DL (ref 12–15.9)
LYMPHOCYTES # BLD AUTO: 2.5 10*3/MM3 (ref 0.7–3.1)
LYMPHOCYTES NFR BLD AUTO: 37.7 % (ref 19.6–45.3)
MAGNESIUM SERPL-MCNC: 1.8 MG/DL (ref 1.6–2.6)
MCH RBC QN AUTO: 29.4 PG (ref 26.6–33)
MCHC RBC AUTO-ENTMCNC: 32.6 G/DL (ref 31.5–35.7)
MCV RBC AUTO: 90.2 FL (ref 79–97)
MONOCYTES # BLD AUTO: 0.4 10*3/MM3 (ref 0.1–0.9)
MONOCYTES NFR BLD AUTO: 6 % (ref 5–12)
NEUTROPHILS NFR BLD AUTO: 3.1 10*3/MM3 (ref 1.7–7)
NEUTROPHILS NFR BLD AUTO: 48.2 % (ref 42.7–76)
NRBC BLD AUTO-RTO: 0 /100 WBC (ref 0–0.2)
OSMOLALITY SERPL: 290 MOSM/KG (ref 275–295)
PLATELET # BLD AUTO: 169 10*3/MM3 (ref 140–450)
PMV BLD AUTO: 9.4 FL (ref 6–12)
POTASSIUM SERPL-SCNC: 3.6 MMOL/L (ref 3.5–5.2)
QT INTERVAL: 397 MS
RBC # BLD AUTO: 3.28 10*6/MM3 (ref 3.77–5.28)
SODIUM SERPL-SCNC: 143 MMOL/L (ref 136–145)
WBC NRBC COR # BLD: 6.5 10*3/MM3 (ref 3.4–10.8)

## 2022-07-03 PROCEDURE — 83930 ASSAY OF BLOOD OSMOLALITY: CPT | Performed by: PHYSICIAN ASSISTANT

## 2022-07-03 PROCEDURE — G0378 HOSPITAL OBSERVATION PER HR: HCPCS

## 2022-07-03 PROCEDURE — 80048 BASIC METABOLIC PNL TOTAL CA: CPT | Performed by: PHYSICIAN ASSISTANT

## 2022-07-03 PROCEDURE — 85018 HEMOGLOBIN: CPT | Performed by: PHYSICIAN ASSISTANT

## 2022-07-03 PROCEDURE — 83735 ASSAY OF MAGNESIUM: CPT | Performed by: PHYSICIAN ASSISTANT

## 2022-07-03 PROCEDURE — 85014 HEMATOCRIT: CPT | Performed by: EMERGENCY MEDICINE

## 2022-07-03 PROCEDURE — 96374 THER/PROPH/DIAG INJ IV PUSH: CPT

## 2022-07-03 PROCEDURE — 85014 HEMATOCRIT: CPT | Performed by: PHYSICIAN ASSISTANT

## 2022-07-03 PROCEDURE — 99203 OFFICE O/P NEW LOW 30 MIN: CPT | Performed by: INTERNAL MEDICINE

## 2022-07-03 PROCEDURE — 85018 HEMOGLOBIN: CPT | Performed by: EMERGENCY MEDICINE

## 2022-07-03 PROCEDURE — 96361 HYDRATE IV INFUSION ADD-ON: CPT

## 2022-07-03 PROCEDURE — 93270 REMOTE 30 DAY ECG REV/REPORT: CPT | Performed by: INTERNAL MEDICINE

## 2022-07-03 PROCEDURE — 93306 TTE W/DOPPLER COMPLETE: CPT

## 2022-07-03 PROCEDURE — 85025 COMPLETE CBC W/AUTO DIFF WBC: CPT | Performed by: PHYSICIAN ASSISTANT

## 2022-07-03 RX ORDER — PANTOPRAZOLE SODIUM 40 MG/10ML
40 INJECTION, POWDER, LYOPHILIZED, FOR SOLUTION INTRAVENOUS ONCE
Status: COMPLETED | OUTPATIENT
Start: 2022-07-03 | End: 2022-07-03

## 2022-07-03 RX ORDER — FAMOTIDINE 20 MG/1
40 TABLET, FILM COATED ORAL NIGHTLY
Status: DISCONTINUED | OUTPATIENT
Start: 2022-07-03 | End: 2022-07-03

## 2022-07-03 RX ORDER — PANTOPRAZOLE SODIUM 40 MG/10ML
40 INJECTION, POWDER, LYOPHILIZED, FOR SOLUTION INTRAVENOUS
Status: DISCONTINUED | OUTPATIENT
Start: 2022-07-03 | End: 2022-07-03

## 2022-07-03 RX ORDER — ASPIRIN 81 MG/1
81 TABLET, CHEWABLE ORAL DAILY
Status: DISCONTINUED | OUTPATIENT
Start: 2022-07-03 | End: 2022-07-03 | Stop reason: HOSPADM

## 2022-07-03 RX ORDER — DULOXETIN HYDROCHLORIDE 20 MG/1
20 CAPSULE, DELAYED RELEASE ORAL NIGHTLY
Status: DISCONTINUED | OUTPATIENT
Start: 2022-07-03 | End: 2022-07-03 | Stop reason: HOSPADM

## 2022-07-03 RX ORDER — LANOLIN ALCOHOL/MO/W.PET/CERES
1000 CREAM (GRAM) TOPICAL DAILY
Refills: 4 | Status: DISCONTINUED | OUTPATIENT
Start: 2022-07-03 | End: 2022-07-03 | Stop reason: HOSPADM

## 2022-07-03 RX ADMIN — SODIUM CHLORIDE, POTASSIUM CHLORIDE, SODIUM LACTATE AND CALCIUM CHLORIDE 125 ML/HR: 600; 310; 30; 20 INJECTION, SOLUTION INTRAVENOUS at 08:13

## 2022-07-03 RX ADMIN — SODIUM CHLORIDE, POTASSIUM CHLORIDE, SODIUM LACTATE AND CALCIUM CHLORIDE 125 ML/HR: 600; 310; 30; 20 INJECTION, SOLUTION INTRAVENOUS at 00:12

## 2022-07-03 RX ADMIN — ASPIRIN 81 MG: 81 TABLET, CHEWABLE ORAL at 08:38

## 2022-07-03 RX ADMIN — CYANOCOBALAMIN TAB 1000 MCG 1000 MCG: 1000 TAB at 08:38

## 2022-07-03 RX ADMIN — PANTOPRAZOLE SODIUM 40 MG: 40 INJECTION, POWDER, FOR SOLUTION INTRAVENOUS at 08:38

## 2022-07-03 NOTE — CONSULTS
Referring Provider: Dr. Jose A Stevens  Reason for Consultation: Presyncope    Chief complaint syncope    Cardiology assessment and plan      Presyncope and syncope  Normal LV systolic function  Normal troponin normal proBNP  Splenic aneurysm status post coiling  Orthostatic hypotension  Gastroparesis  Possible hypercoagulable state  Multiple nonspecific symptoms  Fatigue and weakness  Syncope with loss of consciousness    Echocardiogram with normal LV systolic function  T-max is 98.1 pulse is 80 respirations are 16 blood pressure is 98/65 sats are 99%  Discussed with patient at length at bedside  Had some cardiac evaluation work-up in the past including a stress test  Likely will benefit from a tilt table testing at this point  Patient is also advised to follow-up with endocrinology for further evaluation treatment options  Avoid dehydration  Close monitoring of blood pressure and heart rate at home  Extended Holter monitor for 4 weeks at the time of discharge  Follow-up with primary cardiologist in office next week  Need for close monitoring and follow-up reviewed and discussed with patient          History of present illness:  Renae Tang is a 36 y.o. female who presents with past medical history that is significant for history of depression and history of splenic artery aneurysm status post coiling procedure history of gastroparesis history of fatigue and weakness had extensive work-up and evaluation in the past    Presented to the hospital with complaints of fatigue weakness and also episodes of syncope and loss of consciousness  Patient was orthostatic that has improved with IV fluids  Patient denies any chest discomfort  No palpitations  No orthopnea no PND no weight gain  Complaining of excessive thirst and increased fluid intake           Review of Systems  Review of Systems   Constitutional: Negative for chills, decreased appetite and malaise/fatigue.   HENT: Positive for nosebleeds. Negative for  congestion.    Eyes: Negative for blurred vision and double vision.   Cardiovascular: Positive for near-syncope and syncope. Negative for chest pain, dyspnea on exertion, irregular heartbeat and leg swelling.   Respiratory: Negative for cough and shortness of breath.    Hematologic/Lymphatic: Negative for adenopathy. Does not bruise/bleed easily.   Skin: Negative for rash.   Musculoskeletal: Negative for back pain and joint pain.   Gastrointestinal: Negative for bloating, abdominal pain, hematemesis and hematochezia.   Genitourinary: Negative for flank pain and hematuria.   Neurological: Negative for dizziness and focal weakness.   Psychiatric/Behavioral: Negative for altered mental status. The patient has insomnia.        Past Medical History  Past Medical History:   Diagnosis Date   • ADHD (attention deficit hyperactivity disorder)    • Anxiety    • Back pain    • DDD (degenerative disc disease), lumbar     mild   • Hyperlipidemia    • Hypothyroidism    • Splenic artery aneurysm (HCC)     coiled since 19    and   Past Surgical History:   Procedure Laterality Date   • ABDOMINAL SURGERY      coiling of spenic aneurysm   •  SECTION  2016 and 8/2018    x2   • ENDOSCOPY N/A 2021    Procedure: ESOPHAGOGASTRODUODENOSCOPY;  Surgeon: Jung Bob MD;  Location: [facility] ENDOSCOPY;  Service: Gastroenterology;  Laterality: N/A;  reflux, esophagitis, gastroparesis   • LAPAROSCOPIC SPLENECTOMY      artery aneurysym repair   • RADIOFREQUENCY ABLATION  2019   • TONSILLECTOMY  2018   • TONSILLECTOMY         Family History  Family History   Problem Relation Age of Onset   • Cancer Maternal Grandmother    • Hypertension Paternal Grandmother    • Heart disease Paternal Grandmother    • Hyperlipidemia Paternal Grandmother    • Depression Sister    • Depression Brother    • Drug abuse Brother        Social History  Social History     Socioeconomic History   • Marital status:    • Number of  "children: 8   • Highest education level: Master's degree (e.g., MA, MS, Jaclyn, MEd, MSW, VENKAT)   Tobacco Use   • Smoking status: Never Smoker   • Smokeless tobacco: Never Used   Vaping Use   • Vaping Use: Never used   Substance and Sexual Activity   • Alcohol use: No   • Drug use: No   • Sexual activity: Defer       Objective     Physical Exam:  Constitutional:       Appearance: Well-developed.   Eyes:      Conjunctiva/sclera: Conjunctivae normal.      Pupils: Pupils are equal, round, and reactive to light.   HENT:      Head: Normocephalic and atraumatic.   Neck:      Thyroid: No thyromegaly.   Pulmonary:      Effort: Pulmonary effort is normal.      Breath sounds: Normal breath sounds.   Cardiovascular:      Normal rate. Regular rhythm.   Pulses:     Intact distal pulses.   Abdominal:      General: Bowel sounds are normal.      Palpations: Abdomen is soft.   Musculoskeletal:      Cervical back: Normal range of motion and neck supple. Skin:     General: Skin is warm.   Neurological:      Mental Status: Alert and oriented to person, place, and time.         Vital Signs  Vitals:    07/03/22 1217 07/03/22 1230 07/03/22 1242 07/03/22 1355   BP: 95/60 95/60 95/60 101/66   BP Location:    Left arm   Patient Position:    Lying   Pulse:       Resp:    16   Temp:    98.1 °F (36.7 °C)   TempSrc:    Oral   SpO2:    99%   Weight: 83.9 kg (185 lb) 83.9 kg (185 lb) 83.9 kg (185 lb)    Height: 167.6 cm (66\") 167.6 cm (66\") 167.6 cm (66\")        Weight  Flowsheet Rows    Flowsheet Row First Filed Value   Admission Height 167.6 cm (66\") Documented at 07/02/2022 1146   Admission Weight 81.6 kg (180 lb) Documented at 07/02/2022 1146              Results Review:  Lab Results (last 24 hours)     Procedure Component Value Units Date/Time    Osmolality, Serum [[id number]]  (Normal) Collected: 07/03/22 1449    Specimen: Blood from Foot, Right Updated: 07/03/22 1515     Osmolality 290 mOsm/kg     Hemoglobin & Hematocrit, Blood [[id number]]  " (Abnormal) Collected: 07/03/22 1449    Specimen: Blood from Foot, Right Updated: 07/03/22 1503     Hemoglobin 10.5 g/dL      Hematocrit 32.1 %     Hemoglobin & Hematocrit, Blood [[id number]]  (Abnormal) Collected: 07/03/22 0822    Specimen: Blood from Foot, Right Updated: 07/03/22 0847     Hemoglobin 10.7 g/dL      Hematocrit 32.5 %     CBC & Differential [[id number]]  (Abnormal) Collected: 07/03/22 0639    Specimen: Blood Updated: 07/03/22 0723    Narrative:      The following orders were created for panel order CBC & Differential.  Procedure                               Abnormality         Status                     ---------                               -----------         ------                     CBC Auto Differential[[id number]]        Abnormal            Final result                 Please view results for these tests on the individual orders.    CBC Auto Differential [[id number]]  (Abnormal) Collected: 07/03/22 0639    Specimen: Blood Updated: 07/03/22 0723     WBC 6.50 10*3/mm3      RBC 3.28 10*6/mm3      Hemoglobin 9.7 g/dL      Comment: Result checked         Hematocrit 29.6 %      MCV 90.2 fL      MCH 29.4 pg      MCHC 32.6 g/dL      RDW 14.6 %      RDW-SD 46.4 fl      MPV 9.4 fL      Platelets 169 10*3/mm3      Neutrophil % 48.2 %      Lymphocyte % 37.7 %      Monocyte % 6.0 %      Eosinophil % 7.3 %      Basophil % 0.8 %      Neutrophils, Absolute 3.10 10*3/mm3      Lymphocytes, Absolute 2.50 10*3/mm3      Monocytes, Absolute 0.40 10*3/mm3      Eosinophils, Absolute 0.50 10*3/mm3      Basophils, Absolute 0.10 10*3/mm3      nRBC 0.0 /100 WBC     Basic Metabolic Panel [[id number]]  (Abnormal) Collected: 07/03/22 0639    Specimen: Blood Updated: 07/03/22 0709     Glucose 98 mg/dL      BUN 12 mg/dL      Creatinine 0.76 mg/dL      Sodium 143 mmol/L      Potassium 3.6 mmol/L      Chloride 111 mmol/L      CO2 22.0 mmol/L      Calcium 7.6 mg/dL      BUN/Creatinine Ratio 15.8     Anion Gap 10.0 mmol/L      eGFR  103.0 mL/min/1.73      Comment: National Kidney Foundation and American Society of Nephrology (ASN) Task Force recommended calculation based on the Chronic Kidney Disease Epidemiology Collaboration (CKD-EPI) equation refit without adjustment for race.       Narrative:      GFR Normal >60  Chronic Kidney Disease <60  Kidney Failure <15      Magnesium [[id number]]  (Normal) Collected: 07/03/22 0639    Specimen: Blood Updated: 07/03/22 0709     Magnesium 1.8 mg/dL     Extra Tubes [[id number]] Collected: 07/02/22 2017    Specimen: Blood from Arm, Right Updated: 07/02/22 2133    Narrative:      The following orders were created for panel order Extra Tubes.  Procedure                               Abnormality         Status                     ---------                               -----------         ------                     Lavender Top[[id number]]                                     Final result               Light Blue Top[[id number]]                                   Final result                 Please view results for these tests on the individual orders.    Lavender Top [[id number]] Collected: 07/02/22 2017    Specimen: Blood from Arm, Right Updated: 07/02/22 2133     Extra Tube hold for add-on     Comment: Auto resulted       Light Blue Top [[id number]] Collected: 07/02/22 2017    Specimen: Blood from Arm, Right Updated: 07/02/22 2133     Extra Tube Hold for add-ons.     Comment: Auto resulted       Troponin [[id number]]  (Normal) Collected: 07/02/22 2017    Specimen: Blood from Arm, Right Updated: 07/02/22 2054     Troponin T <0.010 ng/mL     Narrative:      Troponin T Reference Range:  <= 0.03 ng/mL-   Negative for AMI  >0.03 ng/mL-     Abnormal for myocardial necrosis.  Clinicians would have to utilize clinical acumen, EKG, Troponin and serial changes to determine if it is an Acute Myocardial Infarction or myocardial injury due to an underlying chronic condition.       Results may be falsely decreased if patient  taking Biotin.          Imaging Results (Last 72 Hours)     Procedure Component Value Units Date/Time    CT Head Without Contrast [[id number]] Collected: 07/02/22 1402     Updated: 07/02/22 1406    Narrative:      CT HEAD WO CONTRAST-     Date of Exam: 7/2/2022 1:58 PM     Indication: syncope.  Headache.     Comparison: None available.     Technique:  Without contrast, contiguous axial CT images of the head  were obtained from skull base to vertex.  Coronal and sagittal  reconstructions were performed.  Automated exposure control and  iterative reconstruction methods were used.     FINDINGS  No evidence of intracranial hemorrhage, mass or midline shift. Sulci and  ventricles are symmetric. Brain volume is appropriate for patient's age.  The gray-white matter differentiation is intact. The mastoid air cells  and paranasal sinuses are well aerated. Globes and extraocular muscles  are normal. No displaced or depressed skull fractures. No suspicious  lytic or sclerotic osseous lesions.       Impression:      No acute intracranial abnormality.  Brain MRI is more sensitive to evaluate for acute or subacute infarcts  and to evaluate for intracranial metastatic disease.     Electronically Signed By-Marsha Ladd MD On:7/2/2022 2:04 PM  This report was finalized on [id number] by  Marsha Ladd MD.    XR Chest 1 View [[id number]] Collected: 07/02/22 1320     Updated: 07/02/22 1323    Narrative:      DATE OF EXAM:  7/2/2022 12:30 PM     PROCEDURE:  XR CHEST 1 VW-     INDICATIONS:  syncope     COMPARISON:  11/02/2020 chest x-ray     TECHNIQUE:   Single radiographic view of the chest was obtained.     FINDINGS:  Lungs are normally expanded. Heart size is normal. No pneumothorax or  pleural effusion or focal pulmonary parenchymal opacity. Pulmonary  vessels are distinct. Bones and soft tissues are normal.       Impression:      No acute cardiopulmonary abnormality.     Electronically Signed By-Marsha Ladd MD On:7/2/2022 1:21  PM  This report was finalized on [id number] by  Marsha Ladd MD.              Medication Review  Scheduled Meds:aspirin, 81 mg, Oral, Daily  DULoxetine, 20 mg, Oral, Nightly  sodium chloride, 10 mL, Intravenous, Q12H  vitamin B-12, 1,000 mcg, Oral, Daily      Continuous Infusions:lactated ringers, 75 mL/hr, Last Rate: Stopped (07/03/22 1130)      PRN Meds:.ondansetron **OR** ondansetron  •  [COMPLETED] Insert peripheral IV **AND** sodium chloride  •  sodium chloride    Assessment & Plan       Syncope      Patient Active Problem List   Diagnosis   • COVID-19 virus infection   • History of gestational hypertension   • Obesity   • Generalized anxiety disorder   • Mild episode of recurrent major depressive disorder (HCC)   • Abdominal bloating   • Abnormal liver enzymes   • Aneurysm of splenic artery (HCC)   • Asthma   • Binge eating disorder   • Depressive disorder   • Fatigue   • Hyperglycemia   • Low back pain   • Mixed hyperlipidemia   • Palpitations   • Recurrent anxiety   • Recurrent major depression (HCC)   • Supraventricular tachycardia (HCC)   • Vitamin D deficiency   • COVID-19   • Abnormal thyroid function test   • Weight gain   • Irregular periods/menstrual cycles   • Abnormal liver function test   • Syncope           Alok Thompson MD  07/03/22  15:20 EDT

## 2022-07-03 NOTE — PLAN OF CARE
Goal Outcome Evaluation:           Progress: improving  Outcome Evaluation: Patient stable and discharging with a monitor.

## 2022-07-03 NOTE — PLAN OF CARE
Problem: Fall Injury Risk  Goal: Absence of Fall and Fall-Related Injury  Outcome: Ongoing, Progressing  Intervention: Promote Injury-Free Environment  Recent Flowsheet Documentation  Taken 7/3/2022 0400 by Angie Falcon RN  Safety Promotion/Fall Prevention: safety round/check completed  Taken 7/3/2022 0200 by Angie Falcon RN  Safety Promotion/Fall Prevention: safety round/check completed  Taken 7/3/2022 0000 by Angie Falcon RN  Safety Promotion/Fall Prevention: safety round/check completed  Taken 7/2/2022 2200 by Angie Falcon RN  Safety Promotion/Fall Prevention: safety round/check completed  Taken 7/2/2022 2000 by Angie Falcon RN  Safety Promotion/Fall Prevention: safety round/check completed  Taken 7/2/2022 1935 by Angie Falcon RN  Safety Promotion/Fall Prevention:   safety round/check completed   nonskid shoes/slippers when out of bed     Problem: Adult Inpatient Plan of Care  Goal: Plan of Care Review  Outcome: Ongoing, Progressing  Goal: Patient-Specific Goal (Individualized)  Outcome: Ongoing, Progressing  Goal: Absence of Hospital-Acquired Illness or Injury  Outcome: Ongoing, Progressing  Intervention: Identify and Manage Fall Risk  Recent Flowsheet Documentation  Taken 7/3/2022 0400 by Angie Falcon RN  Safety Promotion/Fall Prevention: safety round/check completed  Taken 7/3/2022 0200 by Angie Falcon RN  Safety Promotion/Fall Prevention: safety round/check completed  Taken 7/3/2022 0000 by Angie Falcon RN  Safety Promotion/Fall Prevention: safety round/check completed  Taken 7/2/2022 2200 by Angie Falcon RN  Safety Promotion/Fall Prevention: safety round/check completed  Taken 7/2/2022 2000 by Angie Falcon RN  Safety Promotion/Fall Prevention: safety round/check completed  Taken 7/2/2022 1935 by Angie Falcon RN  Safety Promotion/Fall Prevention:   safety round/check completed   nonskid shoes/slippers when out of bed  Intervention: Prevent Skin Injury  Recent Flowsheet  Documentation  Taken 7/2/2022 1935 by Angie Falcon RN  Body Position:   side-lying   left  Goal: Optimal Comfort and Wellbeing  Outcome: Ongoing, Progressing  Intervention: Monitor Pain and Promote Comfort  Recent Flowsheet Documentation  Taken 7/2/2022 1935 by Angie Falcon RN  Pain Management Interventions:   no interventions per patient request   quiet environment facilitated  Intervention: Provide Person-Centered Care  Recent Flowsheet Documentation  Taken 7/2/2022 1935 by Angie Falcon RN  Trust Relationship/Rapport:   care explained   choices provided   emotional support provided   empathic listening provided   questions answered   questions encouraged   reassurance provided   thoughts/feelings acknowledged  Goal: Readiness for Transition of Care  Outcome: Ongoing, Progressing     Problem: Asthma Comorbidity  Goal: Maintenance of Asthma Control  Outcome: Ongoing, Progressing     Problem: Behavioral Health Comorbidity  Goal: Maintenance of Behavioral Health Symptom Control  Outcome: Ongoing, Progressing     Problem: COPD (Chronic Obstructive Pulmonary Disease) Comorbidity  Goal: Maintenance of COPD Symptom Control  Outcome: Ongoing, Progressing     Problem: Diabetes Comorbidity  Goal: Blood Glucose Level Within Targeted Range  Outcome: Ongoing, Progressing     Problem: Heart Failure Comorbidity  Goal: Maintenance of Heart Failure Symptom Control  Outcome: Ongoing, Progressing     Problem: Hypertension Comorbidity  Goal: Blood Pressure in Desired Range  Outcome: Ongoing, Progressing     Problem: Obstructive Sleep Apnea Risk or Actual Comorbidity Management  Goal: Unobstructed Breathing During Sleep  Outcome: Ongoing, Progressing     Problem: Osteoarthritis Comorbidity  Goal: Maintenance of Osteoarthritis Symptom Control  Outcome: Ongoing, Progressing     Problem: Pain Chronic (Persistent) (Comorbidity Management)  Goal: Acceptable Pain Control and Functional Ability  Outcome: Ongoing,  Progressing  Intervention: Develop Pain Management Plan  Recent Flowsheet Documentation  Taken 7/2/2022 1935 by Angie Falcon RN  Pain Management Interventions:   no interventions per patient request   quiet environment facilitated     Problem: Seizure Disorder Comorbidity  Goal: Maintenance of Seizure Control  Outcome: Ongoing, Progressing   Goal Outcome Evaluation:

## 2022-07-05 LAB
BH CV ECHO MEAS - LA A2CS (ATRIAL LENGTH): 2.8 CM
MAXIMAL PREDICTED HEART RATE: 182 BPM
STRESS TARGET HR: 155 BPM

## 2022-07-05 NOTE — CASE MANAGEMENT/SOCIAL WORK
Case Management Discharge Note      Final Note: Discharged prior to CM assessment.  Transportation Services  Private: Car    Final Discharge Disposition Code: 01 - home or self-care

## 2022-08-03 LAB
Lab: 14
TOAL ENROLLMENT DAYS: 28

## 2022-08-03 PROCEDURE — 93272 ECG/REVIEW INTERPRET ONLY: CPT | Performed by: INTERNAL MEDICINE

## 2022-11-02 ENCOUNTER — APPOINTMENT (OUTPATIENT)
Dept: GENERAL RADIOLOGY | Facility: HOSPITAL | Age: 37
End: 2022-11-02

## 2022-11-02 ENCOUNTER — HOSPITAL ENCOUNTER (EMERGENCY)
Facility: HOSPITAL | Age: 37
Discharge: HOME | End: 2022-11-02
Attending: EMERGENCY MEDICINE | Admitting: EMERGENCY MEDICINE

## 2022-11-02 VITALS
HEART RATE: 66 BPM | BODY MASS INDEX: 27.03 KG/M2 | HEIGHT: 66 IN | RESPIRATION RATE: 16 BRPM | OXYGEN SATURATION: 100 % | TEMPERATURE: 97.5 F | WEIGHT: 168.21 LBS | DIASTOLIC BLOOD PRESSURE: 70 MMHG | SYSTOLIC BLOOD PRESSURE: 100 MMHG

## 2022-11-02 DIAGNOSIS — M79.641 RIGHT HAND PAIN: Primary | ICD-10-CM

## 2022-11-02 LAB
ALBUMIN SERPL-MCNC: 4.4 G/DL (ref 3.5–5.2)
ALBUMIN/GLOB SERPL: 1.7 G/DL
ALP SERPL-CCNC: 57 U/L (ref 39–117)
ALT SERPL W P-5'-P-CCNC: 6 U/L (ref 1–33)
ANION GAP SERPL CALCULATED.3IONS-SCNC: 9 MMOL/L (ref 5–15)
AST SERPL-CCNC: 10 U/L (ref 1–32)
BASOPHILS # BLD AUTO: 0.1 10*3/MM3 (ref 0–0.2)
BASOPHILS NFR BLD AUTO: 1 % (ref 0–1.5)
BILIRUB SERPL-MCNC: 0.2 MG/DL (ref 0–1.2)
BUN SERPL-MCNC: 13 MG/DL (ref 6–20)
BUN/CREAT SERPL: 14.6 (ref 7–25)
CALCIUM SPEC-SCNC: 9.2 MG/DL (ref 8.6–10.5)
CHLORIDE SERPL-SCNC: 99 MMOL/L (ref 98–107)
CO2 SERPL-SCNC: 31 MMOL/L (ref 22–29)
CREAT SERPL-MCNC: 0.89 MG/DL (ref 0.57–1)
CRP SERPL-MCNC: <0.3 MG/DL (ref 0–0.5)
DEPRECATED RDW RBC AUTO: 46.8 FL (ref 37–54)
EGFRCR SERPLBLD CKD-EPI 2021: 85.2 ML/MIN/1.73
EOSINOPHIL # BLD AUTO: 0.3 10*3/MM3 (ref 0–0.4)
EOSINOPHIL NFR BLD AUTO: 4.5 % (ref 0.3–6.2)
ERYTHROCYTE [DISTWIDTH] IN BLOOD BY AUTOMATED COUNT: 14.2 % (ref 12.3–15.4)
ERYTHROCYTE [SEDIMENTATION RATE] IN BLOOD: 14 MM/HR (ref 0–20)
GLOBULIN UR ELPH-MCNC: 2.6 GM/DL
GLUCOSE SERPL-MCNC: 114 MG/DL (ref 65–99)
HCT VFR BLD AUTO: 35.4 % (ref 34–46.6)
HGB BLD-MCNC: 11.6 G/DL (ref 12–15.9)
HOLD SPECIMEN: NORMAL
LYMPHOCYTES # BLD AUTO: 2.9 10*3/MM3 (ref 0.7–3.1)
LYMPHOCYTES NFR BLD AUTO: 39.4 % (ref 19.6–45.3)
MCH RBC QN AUTO: 30.9 PG (ref 26.6–33)
MCHC RBC AUTO-ENTMCNC: 32.7 G/DL (ref 31.5–35.7)
MCV RBC AUTO: 94.4 FL (ref 79–97)
MONOCYTES # BLD AUTO: 0.4 10*3/MM3 (ref 0.1–0.9)
MONOCYTES NFR BLD AUTO: 5.8 % (ref 5–12)
NEUTROPHILS NFR BLD AUTO: 3.7 10*3/MM3 (ref 1.7–7)
NEUTROPHILS NFR BLD AUTO: 49.3 % (ref 42.7–76)
NRBC BLD AUTO-RTO: 0 /100 WBC (ref 0–0.2)
PLATELET # BLD AUTO: 262 10*3/MM3 (ref 140–450)
PMV BLD AUTO: 8.9 FL (ref 6–12)
POTASSIUM SERPL-SCNC: 3.8 MMOL/L (ref 3.5–5.2)
PROT SERPL-MCNC: 7 G/DL (ref 6–8.5)
RBC # BLD AUTO: 3.75 10*6/MM3 (ref 3.77–5.28)
SODIUM SERPL-SCNC: 139 MMOL/L (ref 136–145)
WBC NRBC COR # BLD: 7.4 10*3/MM3 (ref 3.4–10.8)

## 2022-11-02 PROCEDURE — 86140 C-REACTIVE PROTEIN: CPT | Performed by: EMERGENCY MEDICINE

## 2022-11-02 PROCEDURE — 36415 COLL VENOUS BLD VENIPUNCTURE: CPT | Performed by: EMERGENCY MEDICINE

## 2022-11-02 PROCEDURE — 73110 X-RAY EXAM OF WRIST: CPT

## 2022-11-02 PROCEDURE — 73130 X-RAY EXAM OF HAND: CPT

## 2022-11-02 PROCEDURE — 36415 COLL VENOUS BLD VENIPUNCTURE: CPT

## 2022-11-02 PROCEDURE — 85025 COMPLETE CBC W/AUTO DIFF WBC: CPT | Performed by: EMERGENCY MEDICINE

## 2022-11-02 PROCEDURE — 80053 COMPREHEN METABOLIC PANEL: CPT | Performed by: EMERGENCY MEDICINE

## 2022-11-02 PROCEDURE — 99282 EMERGENCY DEPT VISIT SF MDM: CPT

## 2022-11-02 PROCEDURE — 85652 RBC SED RATE AUTOMATED: CPT | Performed by: EMERGENCY MEDICINE

## 2022-11-02 RX ORDER — MELOXICAM 15 MG/1
15 TABLET ORAL DAILY
Qty: 10 TABLET | Refills: 0 | Status: SHIPPED | OUTPATIENT
Start: 2022-11-02 | End: 2023-01-03

## 2022-11-02 NOTE — DISCHARGE INSTRUCTIONS
May use ice or heat to hand.  Follow-up with hand surgeon for repeat evaluation, return new or worsening symptoms

## 2022-11-02 NOTE — ED PROVIDER NOTES
Subjective   History of Present Illness  36-year-old female presents with right hand pain and swelling.  She states she has had this for about a month.  She states she has used ice and heat to her hand she has tried different antibiotics without improvement.  She states she used antibiotics that she had at home.  She reports no injury.  She denies any drug use.  Patient is noted to be poor historian when trying to obtain factual information about her history  Review of Systems  Negative fever other complaints of pain or swelling.  Past Medical History:   Diagnosis Date   • ADHD (attention deficit hyperactivity disorder)    • Anxiety    • Back pain    • DDD (degenerative disc disease), lumbar     mild   • Hyperlipidemia    • Hypothyroidism    • Splenic artery aneurysm (HCC)     coiled since 19       Allergies   Allergen Reactions   • Atorvastatin Other (See Comments)   • Paxil [Paroxetine] Unknown - Low Severity     EPS   • Latex Rash       Past Surgical History:   Procedure Laterality Date   • ABDOMINAL SURGERY      coiling of spenic aneurysm   •  SECTION  2016 and 8/2018    x2   • ENDOSCOPY N/A 2021    Procedure: ESOPHAGOGASTRODUODENOSCOPY;  Surgeon: Jung Bob MD;  Location: [facility] ENDOSCOPY;  Service: Gastroenterology;  Laterality: N/A;  reflux, esophagitis, gastroparesis   • LAPAROSCOPIC SPLENECTOMY      artery aneurysym repair   • RADIOFREQUENCY ABLATION  2019   • TONSILLECTOMY  2018   • TONSILLECTOMY         Family History   Problem Relation Age of Onset   • Cancer Maternal Grandmother    • Hypertension Paternal Grandmother    • Heart disease Paternal Grandmother    • Hyperlipidemia Paternal Grandmother    • Depression Sister    • Depression Brother    • Drug abuse Brother        Social History     Socioeconomic History   • Marital status:    • Number of children: 8   • Highest education level: Master's degree (e.g., MA, MS, Jaclyn, MEd, MSW, VENKAT)   Tobacco Use   •  Smoking status: Never   • Smokeless tobacco: Never   Vaping Use   • Vaping Use: Never used   Substance and Sexual Activity   • Alcohol use: No   • Drug use: No   • Sexual activity: Defer     Prior to Admission medications    Medication Sig Start Date End Date Taking? Authorizing Provider   aspirin 81 MG chewable tablet Chew 81 mg Daily.    Damari Sifuentes MD   clonazePAM (KlonoPIN) 1 MG tablet Take 1 mg by mouth As Needed. 8/20/21   Damari Sifuentes MD   Cyanocobalamin (Vitamin B-12) 1000 MCG sublingual tablet Place 1 tablet under the tongue Daily. 2/21/22   Wili Banks MD   DULoxetine (CYMBALTA) 20 MG capsule Take 20 mg by mouth every night at bedtime. 3/25/22   Damari Sifuentes MD   EPINEPHrine (EPIPEN) 0.3 MG/0.3ML solution auto-injector injection See Admin Instructions. 3/15/22   Damari Sifuentes MD   famotidine (PEPCID) 40 MG tablet Take 40 mg by mouth every night at bedtime. 2/10/22   Damari Sifuentes MD   medroxyPROGESTERone (PROVERA) 5 MG tablet Take 10 mg by mouth 2 (Two) Times a Day. 12/26/21   Damari Sifuentes MD   propranolol (INDERAL) 10 MG tablet Take 1 tablet by mouth 2 (Two) Times a Day As Needed (anxiety). 8/23/21   Ashley Meeks, BRANDON   Vyvanse 50 MG capsule Take 50 mg by mouth As Needed   8/20/21   Damari Sifuentes MD           Objective   Physical Exam  36-year-old female awake sleepy.  generally well-developed well-nourished.  Chest clear equal breath sounds.  Cardiovascular regular rate and rhythm without murmur gallop appreciated.  Abdomen soft nontender.  Examination of right arm she has area of bruising to right antecubital region.  She states that was from attempted blood draw.  She has several wounds over the dorsal aspect of both hands she reports those are also from blood draws.  She states that she is always a difficult stick.  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  Examination of right hand reveals some soft tissue swelling over dorsal aspect of hand some to the proximal aspect of fingers.  There is no warmth there is no mass.  She has full range of motion without difficulty neurovascular intact.  No other extremity complaints are noted.  Procedures           ED Course      Results for orders placed or performed during the hospital encounter of 11/02/22   Comprehensive Metabolic Panel    Specimen: Arm, Left; Blood   Result Value Ref Range    Glucose 114 (H) 65 - 99 mg/dL    BUN 13 6 - 20 mg/dL    Creatinine 0.89 0.57 - 1.00 mg/dL    Sodium 139 136 - 145 mmol/L    Potassium 3.8 3.5 - 5.2 mmol/L    Chloride 99 98 - 107 mmol/L    CO2 31.0 (H) 22.0 - 29.0 mmol/L    Calcium 9.2 8.6 - 10.5 mg/dL    Total Protein 7.0 6.0 - 8.5 g/dL    Albumin 4.40 3.50 - 5.20 g/dL    ALT (SGPT) 6 1 - 33 U/L    AST (SGOT) 10 1 - 32 U/L    Alkaline Phosphatase 57 39 - 117 U/L    Total Bilirubin 0.2 0.0 - 1.2 mg/dL    Globulin 2.6 gm/dL    A/G Ratio 1.7 g/dL    BUN/Creatinine Ratio 14.6 7.0 - 25.0    Anion Gap 9.0 5.0 - 15.0 mmol/L    eGFR 85.2 >60.0 mL/min/1.73   Sedimentation Rate    Specimen: Arm, Left; Blood   Result Value Ref Range    Sed Rate 14 0 - 20 mm/hr   C-reactive Protein    Specimen: Arm, Left; Blood   Result Value Ref Range    C-Reactive Protein <0.30 0.00 - 0.50 mg/dL   CBC Auto Differential    Specimen: Arm, Left; Blood   Result Value Ref Range    WBC 7.40 3.40 - 10.80 10*3/mm3    RBC 3.75 (L) 3.77 - 5.28 10*6/mm3    Hemoglobin 11.6 (L) 12.0 - 15.9 g/dL    Hematocrit 35.4 34.0 - 46.6 %    MCV 94.4 79.0 - 97.0 fL    MCH 30.9 26.6 - 33.0 pg    MCHC 32.7 31.5 - 35.7 g/dL    RDW 14.2 12.3 - 15.4 %    RDW-SD 46.8 37.0 - 54.0 fl    MPV 8.9 6.0 - 12.0 fL    Platelets 262 140 - 450 10*3/mm3    Neutrophil % 49.3 42.7 - 76.0 %    Lymphocyte % 39.4 19.6 - 45.3 %    Monocyte % 5.8 5.0 - 12.0 %    Eosinophil % 4.5 0.3 - 6.2 %    Basophil % 1.0 0.0 - 1.5 %    Neutrophils,  "Absolute 3.70 1.70 - 7.00 10*3/mm3    Lymphocytes, Absolute 2.90 0.70 - 3.10 10*3/mm3    Monocytes, Absolute 0.40 0.10 - 0.90 10*3/mm3    Eosinophils, Absolute 0.30 0.00 - 0.40 10*3/mm3    Basophils, Absolute 0.10 0.00 - 0.20 10*3/mm3    nRBC 0.0 0.0 - 0.2 /100 WBC   Gold Top - SST   Result Value Ref Range    Extra Tube Hold for add-ons.      XR Wrist 3+ View Right    Result Date: 11/2/2022  No acute findings.  Electronically Signed By-Dean Grover MD On:11/2/2022 12:57 PM This report was finalized on [id number] by  Dean Grover MD.    XR Hand 3+ View Right    Result Date: 11/2/2022  No acute osseous abnormality. Mild dorsal soft tissue swelling is present.  Electronically Signed By-Venecia Crowley MD On:11/2/2022 1:38 PM This report was finalized on [id number] by  Venecia Crowley MD.    Medications - No data to display  BP 99/63   Pulse 67   Temp 97.8 °F (36.6 °C) (Oral)   Resp 15   Ht 167.6 cm (66\")   Wt 76.3 kg (168 lb 3.4 oz)   LMP 10/26/2022 (Approximate)   SpO2 100%   Breastfeeding No   BMI 27.15 kg/m²                                        MDM  Chart review: She had endocrine evaluation in April of this year for fatigue and tiredness.  She had dexamethasone suppression test TSH free T4 vitamin D and B12 all normal.  Recommendation was for good sleep hygiene.  Comorbidity: As per past history  Differential: Localized soft tissue swelling, osteomyelitis,  My EKG interpretation:   Lab: White count 7.4 with hemoglobin 11.6 platelet count 262 49 segs 39 lymphs no bands sed rate normal at 14, CRP less than 0.3.  Comprehensive metabolic panel remarkable for glucose of 114 and a CO2 of 31  Radiology: Reviewed x-rays.  X-ray of right wrist found to be normal.  Hand x-ray was also normal other than some dorsal soft tissue swelling  Discussion/treatment: Patient has numerous punctures to both hands.  She reports they are all from blood draws.  She adamantly denies any drug use.  Patient's findings were " discussed with her.  There is no sign of infection or bony abnormality.  She was placed on Mobic.  She was given hand surgery referral.  To return new or worsening symptoms.  Patient was evaluated using appropriate PPE      Final diagnoses:   Right hand pain       ED Disposition  ED Disposition     ED Disposition   Discharge    Condition   Stable    Comment   --             Napoleon Rousseau MD  [address] [place] [address] [phone]    Schedule an appointment as soon as possible for a visit            Medication List      New Prescriptions    meloxicam 15 MG tablet  Commonly known as: MOBIC  Take 1 tablet by mouth Daily.           Where to Get Your Medications      These medications were sent to [facility] - [place], [place] -[id number] - [place], [place] - [address] [place] [address] - [phone]  - [phone] [address] [place] [address]    Phone: [phone]   · meloxicam 15 MG tablet          Bebeto Ureña MD  11/02/22 [id number]

## 2022-11-30 NOTE — DISCHARGE SUMMARY
[facility]    Art Parker MD    CHIEF COMPLAINT:     Syncope    HISTORY OF PRESENT ILLNESS:    Obtained from admitting physician HPI on 7/2/2022:  36-year-old female states she had syncopal episode this morning while she was standing.  She reports having dry mouth and feeling lightheaded and near syncopal and having had postural syncopal episodes over the last couple of weeks.  She reports no abrupt headaches or focal numbness or weakness or chest pain or shortness of breath.  She reports no fevers or chills or vomiting or diarrhea      7/2/2022 (postobservation admission): Patient Chang HPI noted above including presently 1 week history of multiple episodes of lightheadedness which occur often time with position changes but can occur even while at rest with 2 distinct episodes of syncopal episodes the second which was witnessed by her son who note some mild jerking movements but no obvious seizure-like activity.  Episodes are noted is lasting only a short duration with no postictal period following them.  Initially she notes that over the past 1 to 2 weeks she has had profound increase in thirst though no change in urination or hunger that she has noted.  No dyspnea, cough, fever, nausea or vomiting, peripheral edema or diaphoresis are present.  Patient does not smoke or drink alcohol and does not note any recent changes to medications.    7/3/2022: Patient reports she did generally well throughout the night without recurrence of any symptoms.  Following administration of IV fluid boluses as well as infusion since admission she has had multiple episodes of urination without complaints of dysuria.        Past Medical History:   Diagnosis Date   • ADHD (attention deficit hyperactivity disorder)    • Anxiety    • Back pain    • DDD (degenerative disc disease), lumbar     mild   • Hyperlipidemia    • Hypothyroidism    • Splenic artery aneurysm (HCC)     coiled since 9/17/21  What Type Of Note Output Would You Prefer (Optional)?: Bullet Format     Past Surgical History:   Procedure Laterality Date   • ABDOMINAL SURGERY      coiling of spenic aneurysm   •  SECTION  2016 and 8/2018    x2   • ENDOSCOPY N/A 2021    Procedure: ESOPHAGOGASTRODUODENOSCOPY;  Surgeon: Jung Bob MD;  Location: [facility] ENDOSCOPY;  Service: Gastroenterology;  Laterality: N/A;  reflux, esophagitis, gastroparesis   • LAPAROSCOPIC SPLENECTOMY      artery aneurysym repair   • RADIOFREQUENCY ABLATION  2019   • TONSILLECTOMY  2018   • TONSILLECTOMY       Family History   Problem Relation Age of Onset   • Cancer Maternal Grandmother    • Hypertension Paternal Grandmother    • Heart disease Paternal Grandmother    • Hyperlipidemia Paternal Grandmother    • Depression Sister    • Depression Brother    • Drug abuse Brother      Social History     Tobacco Use   • Smoking status: Never Smoker   • Smokeless tobacco: Never Used   Vaping Use   • Vaping Use: Never used   Substance Use Topics   • Alcohol use: No   • Drug use: No     Medications Prior to Admission   Medication Sig Dispense Refill Last Dose   • aspirin 81 MG chewable tablet Chew 81 mg Daily.   Past Week at Unknown time   • clonazePAM (KlonoPIN) 1 MG tablet Take 1 mg by mouth As Needed.   Past Week at Unknown time   • Cyanocobalamin (Vitamin B-12) 1000 MCG sublingual tablet Place 1 tablet under the tongue Daily. 100 each 4 Past Week at Unknown time   • DULoxetine (CYMBALTA) 20 MG capsule Take 20 mg by mouth every night at bedtime.   Past Week at Unknown time   • famotidine (PEPCID) 40 MG tablet Take 40 mg by mouth every night at bedtime.   Past Week at Unknown time   • Vyvanse 50 MG capsule Take 50 mg by mouth As Needed     2022 at Unknown time   • EPINEPHrine (EPIPEN) 0.3 MG/0.3ML solution auto-injector injection See Admin Instructions.      • medroxyPROGESTERone (PROVERA) 5 MG tablet Take 10 mg by mouth 2 (Two) Times a Day.   More than a month at Unknown time   • propranolol (INDERAL) 10 MG  How Severe Is Your Skin Lesion?: moderate Has Your Skin Lesion Been Treated?: not been treated tablet Take 1 tablet by mouth 2 (Two) Times a Day As Needed (anxiety). 60 tablet 2      Allergies:  Atorvastatin, Paxil [paroxetine], and Latex    Immunization History   Administered Date(s) Administered   • Tdap 12/10/2015           REVIEW OF SYSTEMS:    Constitutional: Negative.   HENT: Negative.    Eyes: Negative.    Cardiovascular: Positive for near-syncope and syncope. Negative for chest pain, dyspnea on exertion, irregular heartbeat, leg swelling and palpitations.   Respiratory: Negative.    Endocrine: Positive for polydipsia. Negative for polyphagia and polyuria.   Skin: Negative.    Musculoskeletal: Negative.    Gastrointestinal: Negative.    Genitourinary: Negative.    Neurological: Positive for light-headedness.   Psychiatric/Behavioral: Negative.      Vital Signs  Temp:  [97.9 °F (36.6 °C)-98.1 °F (36.7 °C)] 98.1 °F (36.7 °C)  Heart Rate:  [52-80] 80  Resp:  [16-18] 16  BP: ()/(51-80) 97/65          Physical Exam:  Physical Exam  Constitutional:       General: She is not in acute distress.     Appearance: Normal appearance. She is not ill-appearing, toxic-appearing or diaphoretic.   HENT:      Head: Normocephalic.      Right Ear: External ear normal.      Left Ear: External ear normal.      Nose: Nose normal.      Mouth/Throat:      Mouth: Mucous membranes are moist.   Eyes:      Extraocular Movements: Extraocular movements intact.   Cardiovascular:      Rate and Rhythm: Normal rate and regular rhythm.      Pulses: Normal pulses.   Pulmonary:      Effort: Pulmonary effort is normal.      Breath sounds: Normal breath sounds.   Abdominal:      General: Bowel sounds are normal.      Palpations: Abdomen is soft.   Musculoskeletal:         General: Normal range of motion.      Cervical back: Normal range of motion.      Right lower leg: No edema.      Left lower leg: No edema.   Skin:     General: Skin is warm and dry.      Capillary Refill: Capillary refill takes less than 2 seconds.   Neurological:       General: No focal deficit present.      Mental Status: She is alert.   Psychiatric:         Mood and Affect: Mood normal.         Behavior: Behavior normal.         Thought Content: Thought content normal.         Judgment: Judgment normal.         Emotional Behavior:   Normal   Debilities:  None  Results Review:    I reviewed the patient's new clinical results.  Lab Results (most recent)     Procedure Component Value Units Date/Time    Hemoglobin & Hematocrit, Blood [[id number]]  (Abnormal) Collected: 07/03/22 0822    Specimen: Blood from Foot, Right Updated: 07/03/22 0847     Hemoglobin 10.7 g/dL      Hematocrit 32.5 %     CBC & Differential [[id number]]  (Abnormal) Collected: 07/03/22 0639    Specimen: Blood Updated: 07/03/22 0723    Narrative:      The following orders were created for panel order CBC & Differential.  Procedure                               Abnormality         Status                     ---------                               -----------         ------                     CBC Auto Differential[[id number]]        Abnormal            Final result                 Please view results for these tests on the individual orders.    CBC Auto Differential [[id number]]  (Abnormal) Collected: 07/03/22 0639    Specimen: Blood Updated: 07/03/22 0723     WBC 6.50 10*3/mm3      RBC 3.28 10*6/mm3      Hemoglobin 9.7 g/dL      Comment: Result checked         Hematocrit 29.6 %      MCV 90.2 fL      MCH 29.4 pg      MCHC 32.6 g/dL      RDW 14.6 %      RDW-SD 46.4 fl      MPV 9.4 fL      Platelets 169 10*3/mm3      Neutrophil % 48.2 %      Lymphocyte % 37.7 %      Monocyte % 6.0 %      Eosinophil % 7.3 %      Basophil % 0.8 %      Neutrophils, Absolute 3.10 10*3/mm3      Lymphocytes, Absolute 2.50 10*3/mm3      Monocytes, Absolute 0.40 10*3/mm3      Eosinophils, Absolute 0.50 10*3/mm3      Basophils, Absolute 0.10 10*3/mm3      nRBC 0.0 /100 WBC     Basic Metabolic Panel [[id number]]  (Abnormal) Collected: 07/03/22 0639  Is This A New Presentation, Or A Follow-Up?: Skin Lesion    Specimen: Blood Updated: 07/03/22 0709     Glucose 98 mg/dL      BUN 12 mg/dL      Creatinine 0.76 mg/dL      Sodium 143 mmol/L      Potassium 3.6 mmol/L      Chloride 111 mmol/L      CO2 22.0 mmol/L      Calcium 7.6 mg/dL      BUN/Creatinine Ratio 15.8     Anion Gap 10.0 mmol/L      eGFR 103.0 mL/min/1.73      Comment: National Kidney Foundation and American Society of Nephrology (ASN) Task Force recommended calculation based on the Chronic Kidney Disease Epidemiology Collaboration (CKD-EPI) equation refit without adjustment for race.       Narrative:      GFR Normal >60  Chronic Kidney Disease <60  Kidney Failure <15      Magnesium [[id number]]  (Normal) Collected: 07/03/22 0639    Specimen: Blood Updated: 07/03/22 0709     Magnesium 1.8 mg/dL     Extra Tubes [[id number]] Collected: 07/02/22 2017    Specimen: Blood from Arm, Right Updated: 07/02/22 2133    Narrative:      The following orders were created for panel order Extra Tubes.  Procedure                               Abnormality         Status                     ---------                               -----------         ------                     Lavender Top[[id number]]                                     Final result               Light Blue Top[[id number]]                                   Final result                 Please view results for these tests on the individual orders.    Lavender Top [[id number]] Collected: 07/02/22 2017    Specimen: Blood from Arm, Right Updated: 07/02/22 2133     Extra Tube hold for add-on     Comment: Auto resulted       Light Blue Top [[id number]] Collected: 07/02/22 2017    Specimen: Blood from Arm, Right Updated: 07/02/22 2133     Extra Tube Hold for add-ons.     Comment: Auto resulted       Troponin [[id number]]  (Normal) Collected: 07/02/22 2017    Specimen: Blood from Arm, Right Updated: 07/02/22 2054     Troponin T <0.010 ng/mL     Narrative:      Troponin T Reference Range:  <= 0.03 ng/mL-   Negative for AMI  >0.03  ng/mL-     Abnormal for myocardial necrosis.  Clinicians would have to utilize clinical acumen, EKG, Troponin and serial changes to determine if it is an Acute Myocardial Infarction or myocardial injury due to an underlying chronic condition.       Results may be falsely decreased if patient taking Biotin.      Extra Tubes [[id number]] Collected: 07/02/22 1246    Specimen: Blood, Venous Line Updated: 07/02/22 1403    Narrative:      The following orders were created for panel order Extra Tubes.  Procedure                               Abnormality         Status                     ---------                               -----------         ------                     Light Blue Top[[id number]]                                   Final result                 Please view results for these tests on the individual orders.    Light Blue Top [[id number]] Collected: 07/02/22 1246    Specimen: Blood Updated: 07/02/22 1403     Extra Tube Hold for add-ons.     Comment: Auto resulted       Urine Drug Screen - Urine, Clean Catch [[id number]]  (Abnormal) Collected: 07/02/22 1306    Specimen: Urine, Clean Catch Updated: 07/02/22 1338     Amphet/Methamphet, Screen Positive     Barbiturates Screen, Urine Negative     Benzodiazepine Screen, Urine Negative     Cocaine Screen, Urine Negative     Opiate Screen Negative     THC, Screen, Urine Negative     Methadone Screen, Urine Negative     Oxycodone Screen, Urine Negative    Narrative:      Negative Thresholds Per Drugs Screened:    Amphetamines                 500 ng/ml  Barbiturates                 200 ng/ml  Benzodiazepines              100 ng/ml  Cocaine                      300 ng/ml  Methadone                    300 ng/ml  Opiates                      300 ng/ml  Oxycodone                    100 ng/ml  THC                           50 ng/ml    The Normal Value for all drugs tested is negative. This report includes final unconfirmed screening results to be used for medical treatment  purposes only. Unconfirmed results must not be used for non-medical purposes such as employment or legal testing. Clinical consideration should be applied to any drug of abuse test, particularly when unconfirmed results are used.          All urine drugs of abuse requests without chain of custody are for medical screening purposes only.  False positives are possible.      Troponin [[id number]]  (Normal) Collected: 07/02/22 1246    Specimen: Blood Updated: 07/02/22 1327     Troponin T <0.010 ng/mL     Narrative:      Troponin T Reference Range:  <= 0.03 ng/mL-   Negative for AMI  >0.03 ng/mL-     Abnormal for myocardial necrosis.  Clinicians would have to utilize clinical acumen, EKG, Troponin and serial changes to determine if it is an Acute Myocardial Infarction or myocardial injury due to an underlying chronic condition.       Results may be falsely decreased if patient taking Biotin.      BNP [[id number]]  (Normal) Collected: 07/02/22 1246    Specimen: Blood Updated: 07/02/22 1327     proBNP 65.7 pg/mL     Narrative:      Among patients with dyspnea, NT-proBNP is highly sensitive for the detection of acute congestive heart failure. In addition NT-proBNP of <300 pg/ml effectively rules out acute congestive heart failure with 99% negative predictive value.    Results may be falsely decreased if patient taking Biotin.      TSH [[id number]]  (Normal) Collected: 07/02/22 1246    Specimen: Blood Updated: 07/02/22 1327     TSH 1.530 uIU/mL     Comprehensive Metabolic Panel [[id number]]  (Abnormal) Collected: 07/02/22 1246    Specimen: Blood Updated: 07/02/22 1324     Glucose 102 mg/dL      BUN 18 mg/dL      Creatinine 1.01 mg/dL      Sodium 139 mmol/L      Potassium 3.6 mmol/L      Comment: Slight hemolysis detected by analyzer. Results may be affected.        Chloride 101 mmol/L      CO2 24.0 mmol/L      Calcium 8.9 mg/dL      Total Protein 7.8 g/dL      Albumin 4.70 g/dL      ALT (SGPT) 14 U/L      AST (SGOT) 20 U/L       Alkaline Phosphatase 59 U/L      Total Bilirubin 0.3 mg/dL      Globulin 3.1 gm/dL      A/G Ratio 1.5 g/dL      BUN/Creatinine Ratio 17.8     Anion Gap 14.0 mmol/L      eGFR 73.2 mL/min/1.73      Comment: National Kidney Foundation and American Society of Nephrology (ASN) Task Force recommended calculation based on the Chronic Kidney Disease Epidemiology Collaboration (CKD-EPI) equation refit without adjustment for race.       Narrative:      GFR Normal >60  Chronic Kidney Disease <60  Kidney Failure <15      Lipase [[id number]]  (Normal) Collected: 07/02/22 1246    Specimen: Blood Updated: 07/02/22 1324     Lipase 28 U/L     Ethanol [[id number]] Collected: 07/02/22 1246    Specimen: Blood Updated: 07/02/22 1324     Ethanol % <0.010 %     Narrative:      Plasma Ethanol Clinical Symptoms:    ETOH (%)               Clinical Symptom  .01-.05              No apparent influence  .03-.12              Euphoria, Diminished judgment and attention   .09-.25              Impaired comprehension, Muscle incoordination  .18-.30              Confusion, Staggered gait, Slurred speech  .25-.40              Markedly decreased response to stimuli, unable to stand or                        walk, vomitting, sleep or stupor  .35-.50              Comatose, Anesthesia, Subnormal body temperature        CK [[id number]]  (Normal) Collected: 07/02/22 1246    Specimen: Blood Updated: 07/02/22 1324     Creatine Kinase 56 U/L     Magnesium [[id number]]  (Normal) Collected: 07/02/22 1246    Specimen: Blood Updated: 07/02/22 1324     Magnesium 2.1 mg/dL     Urinalysis, Microscopic Only - Urine, Clean Catch [[id number]]  (Abnormal) Collected: 07/02/22 1306    Specimen: Urine, Clean Catch Updated: 07/02/22 1322     RBC, UA 0-2 /HPF      WBC, UA 0-2 /HPF      Comment: Urine culture not indicated.        Bacteria, UA None Seen /HPF      Squamous Epithelial Cells, UA 3-6 /HPF      Hyaline Casts, UA 0-2 /LPF      Methodology Automated Microscopy     Urinalysis With Culture If Indicated - Urine, Clean Catch [[id number]]  (Abnormal) Collected: 07/02/22 1306    Specimen: Urine, Clean Catch Updated: 07/02/22 1322     Color, UA Yellow     Appearance, UA Clear     pH, UA 8.0     Specific Gravity, UA 1.009     Glucose, UA Negative     Ketones, UA Negative     Bilirubin, UA Negative     Blood, UA Moderate (2+)     Protein, UA Trace     Leuk Esterase, UA Negative     Nitrite, UA Negative     Urobilinogen, UA 0.2 E.U./dL    Narrative:      In absence of clinical symptoms, the presence of pyuria, bacteria, and/or nitrites on the urinalysis result does not correlate with infection.    COVID-19,CEPHEID/DWAINE,COR/SHANITA/PAD/JULIETA IN-HOUSE(OR EMERGENT/ADD-ON),NP SWAB IN TRANSPORT MEDIA 3-4 HR TAT, RT-PCR - Swab, Nasopharynx [[id number]]  (Normal) Collected: 07/02/22 1247    Specimen: Swab from Nasopharynx Updated: 07/02/22 1315     COVID19 Not Detected    Narrative:      Fact sheet for providers: https://www.fda.gov/media/689926/download     Fact sheet for patients: https://www.fda.gov/media/601811/download  Fact sheet for providers: https://www.fda.gov/media/416922/download    Fact sheet for patients: https://www.fda.gov/media/360334/download    Test performed by PCR.    hCG, Serum, Qualitative [[id number]]  (Normal) Collected: 07/02/22 1246    Specimen: Blood Updated: 07/02/22 1314     HCG Qualitative Negative    CBC & Differential [[id number]]  (Abnormal) Collected: 07/02/22 1246    Specimen: Blood Updated: 07/02/22 1300    Narrative:      The following orders were created for panel order CBC & Differential.  Procedure                               Abnormality         Status                     ---------                               -----------         ------                     CBC Auto Differential[[id number]]        Abnormal            Final result                 Please view results for these tests on the individual orders.    CBC Auto Differential [[id number]]  (Abnormal)  Collected: 07/02/22 1246    Specimen: Blood Updated: 07/02/22 1300     WBC 8.90 10*3/mm3      RBC 4.53 10*6/mm3      Hemoglobin 13.3 g/dL      Hematocrit 40.6 %      MCV 89.7 fL      MCH 29.4 pg      MCHC 32.8 g/dL      RDW 14.5 %      RDW-SD 45.5 fl      MPV 9.1 fL      Platelets 248 10*3/mm3      Neutrophil % 60.1 %      Lymphocyte % 27.7 %      Monocyte % 5.9 %      Eosinophil % 5.5 %      Basophil % 0.8 %      Neutrophils, Absolute 5.40 10*3/mm3      Lymphocytes, Absolute 2.50 10*3/mm3      Monocytes, Absolute 0.50 10*3/mm3      Eosinophils, Absolute 0.50 10*3/mm3      Basophils, Absolute 0.10 10*3/mm3      nRBC 0.0 /100 WBC           Imaging Results (Most Recent)     Procedure Component Value Units Date/Time    CT Head Without Contrast [[id number]] Collected: 07/02/22 1402     Updated: 07/02/22 1406    Narrative:      CT HEAD WO CONTRAST-     Date of Exam: 7/2/2022 1:58 PM     Indication: syncope.  Headache.     Comparison: None available.     Technique:  Without contrast, contiguous axial CT images of the head  were obtained from skull base to vertex.  Coronal and sagittal  reconstructions were performed.  Automated exposure control and  iterative reconstruction methods were used.     FINDINGS  No evidence of intracranial hemorrhage, mass or midline shift. Sulci and  ventricles are symmetric. Brain volume is appropriate for patient's age.  The gray-white matter differentiation is intact. The mastoid air cells  and paranasal sinuses are well aerated. Globes and extraocular muscles  are normal. No displaced or depressed skull fractures. No suspicious  lytic or sclerotic osseous lesions.       Impression:      No acute intracranial abnormality.  Brain MRI is more sensitive to evaluate for acute or subacute infarcts  and to evaluate for intracranial metastatic disease.     Electronically Signed By-Marsha Ladd MD On:7/2/2022 2:04 PM  This report was finalized on [id number] by  Marsha Ladd MD.    XR Chest 1 View  [[id number]] Collected: 07/02/22 1320     Updated: 07/02/22 1323    Narrative:      DATE OF EXAM:  7/2/2022 12:30 PM     PROCEDURE:  XR CHEST 1 VW-     INDICATIONS:  syncope     COMPARISON:  11/02/2020 chest x-ray     TECHNIQUE:   Single radiographic view of the chest was obtained.     FINDINGS:  Lungs are normally expanded. Heart size is normal. No pneumothorax or  pleural effusion or focal pulmonary parenchymal opacity. Pulmonary  vessels are distinct. Bones and soft tissues are normal.       Impression:      No acute cardiopulmonary abnormality.     Electronically Signed By-Marsha Ladd MD On:7/2/2022 1:21 PM  This report was finalized on [id number] by  Marsha Ladd MD.        reviewed    ECG/EMG Results (most recent)     Procedure Component Value Units Date/Time    Adult Transthoracic Echo Complete W/ Cont if Necessary Per Protocol [[id number]] Resulted: 07/03/22 0712     Updated: 07/03/22 0743     Target HR (85%) 155 bpm      Max. Pred. HR (100%) 182 bpm     ECG 12 Lead [[id number]] Collected: 07/02/22 1150     Updated: 07/03/22 1035     QT Interval 397 ms     Narrative:      HEART RATE= 72  bpm  RR Interval= 840  ms  AR Interval= 161  ms  P Horizontal Axis= 35  deg  P Front Axis= 26  deg  QRSD Interval= 88  ms  QT Interval= 397  ms  QRS Axis= 53  deg  T Wave Axis= 33  deg  - BORDERLINE ECG -  Sinus rhythm  Borderline T wave abnormalities  Electronically Signed By: Bismark Stevens (Shanita) 03-Jul-2022 10:35:10  Date and Time of Study: 2022-07-02 11:50:19        reviewed            Microbiology Results (last 10 days)     Procedure Component Value - Date/Time    COVID-19,CEPHEID/DWAINE,COR/SHANITA/PAD/JULIETA IN-HOUSE(OR EMERGENT/ADD-ON),NP SWAB IN TRANSPORT MEDIA 3-4 HR TAT, RT-PCR - Swab, Nasopharynx [[id number]]  (Normal) Collected: 07/02/22 1247    Lab Status: Final result Specimen: Swab from Nasopharynx Updated: 07/02/22 1315     COVID19 Not Detected    Narrative:      Fact sheet for providers:  https://www.fda.gov/media/043142/download     Fact sheet for patients: https://www.fda.gov/media/713560/download  Fact sheet for providers: https://www.fda.gov/media/096312/download    Fact sheet for patients: https://www.fda.gov/media/230717/download    Test performed by PCR.          Assessment & Plan     Syncope       Syncope  -EKG shows sinus rhythm at 72 without obvious acute ST changes or ectopy and a QTC of 433 ms  -CT of head shows no acute intracranial abnormalities  -Troponin: Less than 0.010  -proBNP: 65.7  -TSH: 1.530 with a normal magnesium of 2.1  -UA shows 0-2 WBCs, RBCs, trace protein and 2+ blood but appears to be contaminated with 3 gastric squamous epithelial cells, urine tox positive for amphetamines  -In the ED patient given 2 L fluid bolus  -Echocardiogram reported by interpreting physician is normal  -Continue cardiac monitoring  -A significant decrease in hemoglobin was noted from 13.3 on admission to 9.7 on the morning following with repeat at that time showing an increased to 10.7 and subsequent recheck of 6 hours reported as 10.5.  Patient reported no new symptoms and has not noted any obvious bleeding including melena or hematochezia with only mild to moderate bleeding associated with her menstrual period present.  May be delusional given the amount of IV fluid she is received.  She will follow-up for continued monitoring with PCP  -Cardiology consulted who evaluated patient recommended 4-week Holter monitoring with likely tilt table testing on an outpatient basis    MARTI  -Mild with an elevated serum creatinine of 1.01 and a BUN of 18 (likely prerenal)  -Patient given 2 L fluid bolus in ED  -Monitor BMP while admitted  -Resolved with most recent creatinine of 0.76, BUN of 12 and a BUN/creatinine ratio of 15.8     Anxiety/depression  -Continue home meds once med rec verified     GERD  -Famotidine    I discussed the patients findings and my recommendations with patient and nursing staff.      Discharge Diagnosis:      Syncope      Hospital Course  Patient is a 36 y.o. female presented with multiple near syncopal/syncopal episodes with HPI noted above.  Serial troponins were assessed and found to be less than 0.010 with proBNP noted at 65.7 and CK of 56 on admission.  Her creatinine was somewhat elevated at 1.01 with a BUN of 18 and a BUN/creatinine ratio of 17.8 with remainder of CBC and CMP remaining generally unremarkable.  On the morning following admission patient's hemoglobin showed a decrease from 13.3 in the ED to 9.7.  Recollect was ordered which showed hemoglobin had improved to 10.7 with subsequent finding at 6 hours of 10.5.  She was given 2 L fluid bolus followed by infusion throughout her admission and notes no further symptoms.  Blood pressures have remained fairly consistently in the mid to high 90s following admission from the ED.  Echocardiogram was ordered and reported as normal by interpreting physician.  Cardiology was consulted who evaluated patient and recommended 4-week ambulatory monitoring with follow-up on an outpatient basis and likely tilt table testing.  At this time patient is felt to be in good condition for discharge with close follow-up with her PCP and cardiology on an outpatient basis.  Her full testing/results and plan were discussed with patient along with concerning/alarm symptoms which to call 911/return to the ED.  She is also given instructions to ensure adequate hydration and monitor urine as an indicator of adequacy.  All questions were answered and she verbalizes her understanding and agreement.    Past Medical History:     Past Medical History:   Diagnosis Date   • ADHD (attention deficit hyperactivity disorder)    • Anxiety    • Back pain    • DDD (degenerative disc disease), lumbar     mild   • Hyperlipidemia    • Hypothyroidism    • Splenic artery aneurysm (HCC)     coiled since 9/17/21       Past Surgical History:     Past Surgical History:   Procedure  Laterality Date   • ABDOMINAL SURGERY      coiling of spenic aneurysm   •  SECTION  2016 and 8/2018    x2   • ENDOSCOPY N/A 2021    Procedure: ESOPHAGOGASTRODUODENOSCOPY;  Surgeon: Jung Bob MD;  Location: [facility] ENDOSCOPY;  Service: Gastroenterology;  Laterality: N/A;  reflux, esophagitis, gastroparesis   • LAPAROSCOPIC SPLENECTOMY      artery aneurysym repair   • RADIOFREQUENCY ABLATION  2019   • TONSILLECTOMY  2018   • TONSILLECTOMY         Social History:   Social History     Socioeconomic History   • Marital status:    • Number of children: 8   • Highest education level: Master's degree (e.g., MA, MS, Jaclyn, MEd, MSW, VENKAT)   Tobacco Use   • Smoking status: Never Smoker   • Smokeless tobacco: Never Used   Vaping Use   • Vaping Use: Never used   Substance and Sexual Activity   • Alcohol use: No   • Drug use: No   • Sexual activity: Defer       Procedures Performed         Consults:   Consults     Date and Time Order Name Status Description    7/3/2022  2:43 PM Inpatient Cardiology Consult            Condition on Discharge:     Stable    Discharge Disposition  Home or Self Care    Discharge Medications     Discharge Medications      Continue These Medications      Instructions Start Date   aspirin 81 MG chewable tablet   81 mg, Oral, Daily      clonazePAM 1 MG tablet  Commonly known as: KlonoPIN   1 mg, Oral, As Needed      DULoxetine 20 MG capsule  Commonly known as: CYMBALTA   20 mg, Oral, Every Night at Bedtime      EPINEPHrine 0.3 MG/0.3ML solution auto-injector injection  Commonly known as: EPIPEN   See Admin Instructions      famotidine 40 MG tablet  Commonly known as: PEPCID   40 mg, Oral, Every Night at Bedtime      medroxyPROGESTERone 5 MG tablet  Commonly known as: PROVERA   10 mg, Oral, 2 Times Daily      propranolol 10 MG tablet  Commonly known as: INDERAL   10 mg, Oral, 2 Times Daily PRN      Vitamin B-12 1000 MCG sublingual tablet   1,000 mcg, Sublingual,  Daily      Vyvanse 50 MG capsule  Generic drug: lisdexamfetamine   50 mg, Oral, As Needed             Discharge Diet:     Activity at Discharge:     Follow-up Appointments  No future appointments.  Additional Instructions for the Follow-ups that You Need to Schedule     Discharge Follow-up with PCP   As directed       Currently Documented PCP:    Art Parker MD    PCP Phone Number:    [phone]     Follow Up Details: 5 to 7 days         Discharge Follow-up with Specified Provider: Cardiology; 1 Month   As directed      To: Cardiology    Follow Up: 1 Month               Test Results Pending at Discharge       Risk for Readmission (LACE) Score: 3 (7/3/2022  6:00 AM)          Andrea Alford PA-C  07/03/22  17:02 EDT

## 2022-12-30 ENCOUNTER — TELEPHONE (OUTPATIENT)
Dept: ENDOCRINOLOGY | Facility: CLINIC | Age: 37
End: 2022-12-30
Payer: MEDICAID

## 2022-12-30 NOTE — TELEPHONE ENCOUNTER
Per Jannette- add pt on Tuesday. Sw pt and scheduled appt for 1/3. Pt advised that if she feels any worse or has any recurring chest pain or SOB to go to the ER. Will get discharge summary from [place].

## 2022-12-30 NOTE — TELEPHONE ENCOUNTER
Pt called stating that she was recently admitted to [place] ICU with chest pain, abdominal pain, hypoxia, hypotension and bradycardia. Pt states that during her stay she was found to have low cortisol and was told that she may have addisons. Pt states that they wanted to keep her longer and do and ACTH stimulation test before discharging her but she asked to be discharged and do ACTH stimulation test as out pt. Pt states that her BP and HR are still a little low and she was not sent home on any meds. Pt would like to know if you will order ACTH stimulation test to be done next week?

## 2023-01-03 ENCOUNTER — OFFICE VISIT (OUTPATIENT)
Dept: ENDOCRINOLOGY | Facility: CLINIC | Age: 38
End: 2023-01-03
Payer: MEDICAID

## 2023-01-03 ENCOUNTER — APPOINTMENT (OUTPATIENT)
Dept: GENERAL RADIOLOGY | Facility: HOSPITAL | Age: 38
End: 2023-01-03
Payer: MEDICAID

## 2023-01-03 ENCOUNTER — HOSPITAL ENCOUNTER (OUTPATIENT)
Facility: HOSPITAL | Age: 38
Setting detail: OBSERVATION
Discharge: HOME | End: 2023-01-04
Attending: EMERGENCY MEDICINE | Admitting: EMERGENCY MEDICINE
Payer: MEDICAID

## 2023-01-03 VITALS
BODY MASS INDEX: 25.55 KG/M2 | HEIGHT: 66 IN | WEIGHT: 159 LBS | OXYGEN SATURATION: 95 % | SYSTOLIC BLOOD PRESSURE: 100 MMHG | DIASTOLIC BLOOD PRESSURE: 70 MMHG | HEART RATE: 107 BPM

## 2023-01-03 DIAGNOSIS — Z86.39 HISTORY OF ADDISON'S DISEASE: ICD-10-CM

## 2023-01-03 DIAGNOSIS — E83.51 HYPOCALCEMIA: ICD-10-CM

## 2023-01-03 DIAGNOSIS — I95.89 HYPOTENSION DUE TO HYPOVOLEMIA: Primary | ICD-10-CM

## 2023-01-03 DIAGNOSIS — E86.1 HYPOTENSION DUE TO HYPOVOLEMIA: Primary | ICD-10-CM

## 2023-01-03 DIAGNOSIS — D64.89 ANEMIA DUE TO OTHER CAUSE, NOT CLASSIFIED: ICD-10-CM

## 2023-01-03 DIAGNOSIS — I95.1 ORTHOSTATIC HYPOTENSION: Primary | ICD-10-CM

## 2023-01-03 PROBLEM — E86.0 DEHYDRATION: Status: ACTIVE | Noted: 2023-01-03

## 2023-01-03 PROBLEM — R09.02 HYPOXIA: Status: ACTIVE | Noted: 2022-12-29

## 2023-01-03 PROBLEM — R00.1 BRADYCARDIA: Status: ACTIVE | Noted: 2022-12-28

## 2023-01-03 PROBLEM — I95.9 HYPOTENSION: Status: ACTIVE | Noted: 2022-12-29

## 2023-01-03 LAB
ALBUMIN SERPL-MCNC: 5 G/DL (ref 3.5–5.2)
ALBUMIN/GLOB SERPL: 1.6 G/DL
ALP SERPL-CCNC: 70 U/L (ref 39–117)
ALT SERPL W P-5'-P-CCNC: 15 U/L (ref 1–33)
ANION GAP SERPL CALCULATED.3IONS-SCNC: 16 MMOL/L (ref 5–15)
AST SERPL-CCNC: 16 U/L (ref 1–32)
B-HCG UR QL: NEGATIVE
BASOPHILS # BLD AUTO: 0 10*3/MM3 (ref 0–0.2)
BASOPHILS NFR BLD AUTO: 0.4 % (ref 0–1.5)
BILIRUB SERPL-MCNC: 0.3 MG/DL (ref 0–1.2)
BILIRUB UR QL STRIP: NEGATIVE
BUN SERPL-MCNC: 18 MG/DL (ref 6–20)
BUN/CREAT SERPL: 13.6 (ref 7–25)
CALCIUM SPEC-SCNC: 9.9 MG/DL (ref 8.6–10.5)
CHLORIDE SERPL-SCNC: 99 MMOL/L (ref 98–107)
CLARITY UR: CLEAR
CO2 SERPL-SCNC: 24 MMOL/L (ref 22–29)
COLOR UR: YELLOW
CORTIS SERPL-MCNC: 10.69 MCG/DL
CREAT SERPL-MCNC: 1.32 MG/DL (ref 0.57–1)
D DIMER PPP FEU-MCNC: 0.31 MG/L (FEU) (ref 0–0.5)
DEPRECATED RDW RBC AUTO: 48.6 FL (ref 37–54)
EGFRCR SERPLBLD CKD-EPI 2021: 52.8 ML/MIN/1.73
EOSINOPHIL # BLD AUTO: 0.3 10*3/MM3 (ref 0–0.4)
EOSINOPHIL NFR BLD AUTO: 2.5 % (ref 0.3–6.2)
ERYTHROCYTE [DISTWIDTH] IN BLOOD BY AUTOMATED COUNT: 14.4 % (ref 12.3–15.4)
GLOBULIN UR ELPH-MCNC: 3.1 GM/DL
GLUCOSE SERPL-MCNC: 98 MG/DL (ref 65–99)
GLUCOSE UR STRIP-MCNC: NEGATIVE MG/DL
HCT VFR BLD AUTO: 47.2 % (ref 34–46.6)
HGB BLD-MCNC: 14.8 G/DL (ref 12–15.9)
HGB UR QL STRIP.AUTO: NEGATIVE
KETONES UR QL STRIP: NEGATIVE
LEUKOCYTE ESTERASE UR QL STRIP.AUTO: NEGATIVE
LYMPHOCYTES # BLD AUTO: 2.8 10*3/MM3 (ref 0.7–3.1)
LYMPHOCYTES NFR BLD AUTO: 25.5 % (ref 19.6–45.3)
MAGNESIUM SERPL-MCNC: 1.9 MG/DL (ref 1.6–2.6)
MCH RBC QN AUTO: 30 PG (ref 26.6–33)
MCHC RBC AUTO-ENTMCNC: 31.3 G/DL (ref 31.5–35.7)
MCV RBC AUTO: 96 FL (ref 79–97)
MONOCYTES # BLD AUTO: 0.6 10*3/MM3 (ref 0.1–0.9)
MONOCYTES NFR BLD AUTO: 5.2 % (ref 5–12)
NEUTROPHILS NFR BLD AUTO: 66.4 % (ref 42.7–76)
NEUTROPHILS NFR BLD AUTO: 7.4 10*3/MM3 (ref 1.7–7)
NITRITE UR QL STRIP: NEGATIVE
NRBC BLD AUTO-RTO: 0 /100 WBC (ref 0–0.2)
NT-PROBNP SERPL-MCNC: 57.3 PG/ML (ref 0–450)
PH UR STRIP.AUTO: <=5 [PH] (ref 5–8)
PLATELET # BLD AUTO: 294 10*3/MM3 (ref 140–450)
PMV BLD AUTO: 9.7 FL (ref 6–12)
POTASSIUM SERPL-SCNC: 4.1 MMOL/L (ref 3.5–5.2)
PROT SERPL-MCNC: 8.1 G/DL (ref 6–8.5)
PROT UR QL STRIP: NEGATIVE
RBC # BLD AUTO: 4.92 10*6/MM3 (ref 3.77–5.28)
SODIUM SERPL-SCNC: 139 MMOL/L (ref 136–145)
SP GR UR STRIP: 1.01 (ref 1–1.03)
T4 FREE SERPL-MCNC: 1.24 NG/DL (ref 0.93–1.7)
TROPONIN T SERPL-MCNC: <0.01 NG/ML (ref 0–0.03)
TSH SERPL DL<=0.05 MIU/L-ACNC: 2.25 UIU/ML (ref 0.27–4.2)
UROBILINOGEN UR QL STRIP: NORMAL
WBC NRBC COR # BLD: 11.2 10*3/MM3 (ref 3.4–10.8)

## 2023-01-03 PROCEDURE — 96361 HYDRATE IV INFUSION ADD-ON: CPT

## 2023-01-03 PROCEDURE — 93005 ELECTROCARDIOGRAM TRACING: CPT | Performed by: NURSE PRACTITIONER

## 2023-01-03 PROCEDURE — 82533 TOTAL CORTISOL: CPT | Performed by: NURSE PRACTITIONER

## 2023-01-03 PROCEDURE — 84439 ASSAY OF FREE THYROXINE: CPT | Performed by: NURSE PRACTITIONER

## 2023-01-03 PROCEDURE — 99214 OFFICE O/P EST MOD 30 MIN: CPT | Performed by: INTERNAL MEDICINE

## 2023-01-03 PROCEDURE — 96360 HYDRATION IV INFUSION INIT: CPT

## 2023-01-03 PROCEDURE — G0378 HOSPITAL OBSERVATION PER HR: HCPCS

## 2023-01-03 PROCEDURE — 85379 FIBRIN DEGRADATION QUANT: CPT | Performed by: NURSE PRACTITIONER

## 2023-01-03 PROCEDURE — 99285 EMERGENCY DEPT VISIT HI MDM: CPT

## 2023-01-03 PROCEDURE — 83880 ASSAY OF NATRIURETIC PEPTIDE: CPT | Performed by: NURSE PRACTITIONER

## 2023-01-03 PROCEDURE — 84484 ASSAY OF TROPONIN QUANT: CPT | Performed by: NURSE PRACTITIONER

## 2023-01-03 PROCEDURE — 1160F RVW MEDS BY RX/DR IN RCRD: CPT | Performed by: INTERNAL MEDICINE

## 2023-01-03 PROCEDURE — 80050 GENERAL HEALTH PANEL: CPT | Performed by: NURSE PRACTITIONER

## 2023-01-03 PROCEDURE — 1159F MED LIST DOCD IN RCRD: CPT | Performed by: INTERNAL MEDICINE

## 2023-01-03 PROCEDURE — 71045 X-RAY EXAM CHEST 1 VIEW: CPT

## 2023-01-03 PROCEDURE — 81025 URINE PREGNANCY TEST: CPT | Performed by: NURSE PRACTITIONER

## 2023-01-03 PROCEDURE — 83735 ASSAY OF MAGNESIUM: CPT | Performed by: NURSE PRACTITIONER

## 2023-01-03 PROCEDURE — 81003 URINALYSIS AUTO W/O SCOPE: CPT | Performed by: NURSE PRACTITIONER

## 2023-01-03 RX ORDER — COSYNTROPIN 0.25 MG/ML
0.25 INJECTION, POWDER, FOR SOLUTION INTRAMUSCULAR; INTRAVENOUS ONCE
Status: COMPLETED | OUTPATIENT
Start: 2023-01-04 | End: 2023-01-04

## 2023-01-03 RX ORDER — LAMOTRIGINE 100 MG/1
100 TABLET ORAL DAILY
COMMUNITY
Start: 2022-12-02 | End: 2023-01-03

## 2023-01-03 RX ORDER — LETROZOLE 2.5 MG/1
2.5 TABLET, FILM COATED ORAL DAILY
COMMUNITY
Start: 2022-10-27 | End: 2023-01-03

## 2023-01-03 RX ORDER — MELOXICAM 15 MG/1
15 TABLET ORAL AS NEEDED
COMMUNITY

## 2023-01-03 RX ORDER — FAMOTIDINE 40 MG/1
40 TABLET, FILM COATED ORAL DAILY
COMMUNITY

## 2023-01-03 RX ORDER — SODIUM CHLORIDE 0.9 % (FLUSH) 0.9 %
10 SYRINGE (ML) INJECTION AS NEEDED
Status: DISCONTINUED | OUTPATIENT
Start: 2023-01-03 | End: 2023-01-04 | Stop reason: HOSPADM

## 2023-01-03 RX ORDER — BISACODYL 5 MG/1
5 TABLET, DELAYED RELEASE ORAL DAILY PRN
Status: DISCONTINUED | OUTPATIENT
Start: 2023-01-03 | End: 2023-01-04 | Stop reason: HOSPADM

## 2023-01-03 RX ORDER — BISACODYL 10 MG
10 SUPPOSITORY, RECTAL RECTAL DAILY PRN
Status: DISCONTINUED | OUTPATIENT
Start: 2023-01-03 | End: 2023-01-04 | Stop reason: HOSPADM

## 2023-01-03 RX ORDER — ACETAMINOPHEN 325 MG/1
650 TABLET ORAL EVERY 4 HOURS PRN
Status: DISCONTINUED | OUTPATIENT
Start: 2023-01-03 | End: 2023-01-04 | Stop reason: HOSPADM

## 2023-01-03 RX ORDER — ONDANSETRON 2 MG/ML
4 INJECTION INTRAMUSCULAR; INTRAVENOUS EVERY 6 HOURS PRN
Status: DISCONTINUED | OUTPATIENT
Start: 2023-01-03 | End: 2023-01-04 | Stop reason: HOSPADM

## 2023-01-03 RX ORDER — CLINDAMYCIN HYDROCHLORIDE 300 MG/1
300 CAPSULE ORAL 4 TIMES DAILY
COMMUNITY
Start: 2022-11-11 | End: 2023-01-03

## 2023-01-03 RX ORDER — FAMOTIDINE 20 MG/1
40 TABLET, FILM COATED ORAL ONCE
Status: COMPLETED | OUTPATIENT
Start: 2023-01-03 | End: 2023-01-03

## 2023-01-03 RX ORDER — ONDANSETRON 4 MG/1
4 TABLET, FILM COATED ORAL EVERY 6 HOURS PRN
Status: DISCONTINUED | OUTPATIENT
Start: 2023-01-03 | End: 2023-01-04 | Stop reason: HOSPADM

## 2023-01-03 RX ORDER — CLONAZEPAM 1 MG/1
1 TABLET ORAL ONCE
Status: COMPLETED | OUTPATIENT
Start: 2023-01-03 | End: 2023-01-03

## 2023-01-03 RX ORDER — SODIUM CHLORIDE, SODIUM LACTATE, POTASSIUM CHLORIDE, CALCIUM CHLORIDE 600; 310; 30; 20 MG/100ML; MG/100ML; MG/100ML; MG/100ML
125 INJECTION, SOLUTION INTRAVENOUS CONTINUOUS
Status: DISCONTINUED | OUTPATIENT
Start: 2023-01-03 | End: 2023-01-04 | Stop reason: HOSPADM

## 2023-01-03 RX ORDER — ENOXAPARIN SODIUM 100 MG/ML
INJECTION SUBCUTANEOUS
COMMUNITY
Start: 2022-12-21 | End: 2023-01-03

## 2023-01-03 RX ORDER — POLYETHYLENE GLYCOL 3350 17 G/17G
17 POWDER, FOR SOLUTION ORAL DAILY PRN
Status: DISCONTINUED | OUTPATIENT
Start: 2023-01-03 | End: 2023-01-04 | Stop reason: HOSPADM

## 2023-01-03 RX ORDER — PROGESTERONE 100 MG/1
100 CAPSULE ORAL 2 TIMES DAILY
COMMUNITY
Start: 2022-12-21 | End: 2023-01-03

## 2023-01-03 RX ORDER — TOPIRAMATE 50 MG/1
50 TABLET, FILM COATED ORAL 2 TIMES DAILY
COMMUNITY
Start: 2022-11-16 | End: 2023-01-03

## 2023-01-03 RX ORDER — MEDROXYPROGESTERONE ACETATE 5 MG/1
10 TABLET ORAL AS NEEDED
COMMUNITY

## 2023-01-03 RX ORDER — SODIUM CHLORIDE 0.9 % (FLUSH) 0.9 %
10 SYRINGE (ML) INJECTION EVERY 12 HOURS SCHEDULED
Status: DISCONTINUED | OUTPATIENT
Start: 2023-01-03 | End: 2023-01-04 | Stop reason: HOSPADM

## 2023-01-03 RX ORDER — CLONAZEPAM 1 MG/1
1 TABLET ORAL 2 TIMES DAILY PRN
COMMUNITY

## 2023-01-03 RX ADMIN — SODIUM CHLORIDE, POTASSIUM CHLORIDE, SODIUM LACTATE AND CALCIUM CHLORIDE 125 ML/HR: 600; 310; 30; 20 INJECTION, SOLUTION INTRAVENOUS at 16:36

## 2023-01-03 RX ADMIN — CLONAZEPAM 1 MG: 1 TABLET ORAL at 21:01

## 2023-01-03 RX ADMIN — FAMOTIDINE 40 MG: 20 TABLET, FILM COATED ORAL at 21:01

## 2023-01-03 RX ADMIN — SODIUM CHLORIDE, POTASSIUM CHLORIDE, SODIUM LACTATE AND CALCIUM CHLORIDE 1000 ML: 600; 310; 30; 20 INJECTION, SOLUTION INTRAVENOUS at 13:36

## 2023-01-03 NOTE — PROGRESS NOTES
Endocrine Progress Note Outpatient     Patient Care Team:  Art Parker MD as PCP - General (Family Medicine)    Chief Complaint: Follow-up weight loss and dizziness          HPI: This is a 37-year-old female who has been having issues with dizziness along with some nausea and weight loss was recently hospitalized at [facility].  Her random cortisol was low at 3.9 and she was advised to undergo ACTH stimulation test however she wanted to get discharged and follow-up with us as an outpatient to get that thing done.  She has been feeling like this for last 6 months.  She has not received any steroids recently.    Past Medical History:   Diagnosis Date   • ADHD (attention deficit hyperactivity disorder)    • Anxiety    • Back pain    • DDD (degenerative disc disease), lumbar     mild   • Hyperlipidemia    • Hypothyroidism    • Splenic artery aneurysm (HCC)     coiled since 9/17/21       Social History     Socioeconomic History   • Marital status:    • Number of children: 8   • Highest education level: Master's degree (e.g., MA, MS, Jaclyn, MEd, MSW, VENKAT)   Tobacco Use   • Smoking status: Never   • Smokeless tobacco: Never   Vaping Use   • Vaping Use: Never used   Substance and Sexual Activity   • Alcohol use: No   • Drug use: No   • Sexual activity: Defer       Family History   Problem Relation Age of Onset   • Cancer Maternal Grandmother    • Hypertension Paternal Grandmother    • Heart disease Paternal Grandmother    • Hyperlipidemia Paternal Grandmother    • Depression Sister    • Depression Brother    • Drug abuse Brother        Allergies   Allergen Reactions   • Atorvastatin Other (See Comments)   • Paxil [Paroxetine] Unknown - Low Severity     EPS   • Latex Rash       ROS:   Constitutional:  Admit fatigue, tiredness.    Eyes:  Denies change in visual acuity   HENT:  Denies nasal congestion or sore throat   Respiratory: denies cough, Admit shortness of breath.   Cardiovascular:  denies  chest pain, edema   GI:  Denies abdominal pain, Admit nausea, denies vomiting.   Musculoskeletal:  Denies back pain or joint pain   Integument:  Denies dry skin and rash   Neurologic:  Denies headache, focal weakness or sensory changes   Endocrine:  Denies polyuria or polydipsia   Psychiatric:   Admit anxiety and irritability      Vitals:    01/03/23 0913   BP: 100/70   Pulse: 107   SpO2: 95%   Orthostatic vitals:  Laying pulse was 97/min with blood pressure 102/68  Sitting pulse was 107/min with blood pressure 100/70  A standing pulse was 138/min with blood pressure 95/70.  Body mass index is 25.66 kg/m².     Physical Exam:  GEN: NAD, conversant  EYES: EOMI, PERRL, no conjunctival erythema  NECK: no thyromegaly, full ROM   CV: RRR, no murmurs/rubs/gallops, no peripheral edema  LUNG: CTAB, no wheezes/rales/ronchi  SKIN: no rashes, no acanthosis  MSK: no deformities, full ROM of all extremities  NEURO: no tremors, DTR normal  PSYCH: AOX3, appropriate mood, affect normal      Results Review:     I reviewed the patient's new clinical results.      Lab Results   Component Value Date    GLUCOSE 114 (H) 11/02/2022    BUN 13 11/02/2022    CREATININE 0.89 11/02/2022    EGFRIFNONA 61 02/23/2022    BCR 14.6 11/02/2022    K 3.8 11/02/2022    CO2 31.0 (H) 11/02/2022    CALCIUM 9.2 11/02/2022    ALBUMIN 4.40 11/02/2022    LABIL2 0.9 (L) 07/19/2018    AST 10 11/02/2022    ALT 6 11/02/2022     Lab Results   Component Value Date    TSH 1.530 07/02/2022    FREET4 0.94 02/23/2022    THYROIDAB <8 02/23/2022       Medication Review: Reviewed.       Current Outpatient Medications:   •  aspirin 81 MG chewable tablet, Chew 81 mg Daily., Disp: , Rfl:   •  clonazePAM (KlonoPIN) 1 MG tablet, Take 1 mg by mouth As Needed., Disp: , Rfl:   •  Cyanocobalamin (Vitamin B-12) 1000 MCG sublingual tablet, Place 1 tablet under the tongue Daily., Disp: 100 each, Rfl: 4  •  EPINEPHrine (EPIPEN) 0.3 MG/0.3ML solution auto-injector injection, See Admin  Instructions., Disp: , Rfl:   •  famotidine (PEPCID) 40 MG tablet, Take 40 mg by mouth Daily., Disp: , Rfl:   •  medroxyPROGESTERone (PROVERA) 5 MG tablet, Take 10 mg by mouth 2 (Two) Times a Day., Disp: , Rfl:   •  meloxicam (MOBIC) 15 MG tablet, Take 1 tablet by mouth Daily., Disp: 10 tablet, Rfl: 0  •  Vyvanse 50 MG capsule, Take 50 mg by mouth As Needed  , Disp: , Rfl:           Assessment and plan:  Orthostatic hypotension: This is a 37-year-old female who has been having dizziness with weight loss and nausea and orthostatic hypotension.  Her serum cortisol was low random when she was hospitalized in December at [facility].  We do need to rule out Mandeep's disease.  I will do her ACTH stim test.  Also will check CBC and a CMP along with TSH, free T4, magnesium and phosphorus for further evaluation.    Anemia: We will check ferritin level as well as B12 level and folate level.  And follow CBC.    Hypocalcemia: Check CMP, Po4, Mag and Vit D.     Patient looks lethargic and with significant tachycardia especially orthostatic hypotension and significant reflex tachycardia, discussed with her in detail and it will be better for her to be evaluated today in the emergency room then sending her home and doing the work-up as an outpatient.  She lives in [place].  I spoke with the hospitalist on-call Dr. Carl who told me that hospital is full and baby few hours before bed can be available therefore we could not do a direct admission.  I spoke to the emergency room physician assistant and case discussed with her patient will be sent to the emergency room for evaluation and possible admission.  Patient verbalized understanding and agrees with the plan.  Patient did not want ambulance to take her from our office to the emergency room so she signed a waiver and will be driving herself to the emergency room.     Wili Banks MD FACE.

## 2023-01-03 NOTE — ED PROVIDER NOTES
Subjective   History of Present Illness  Patient is a 37-year-old female who presents today with a longstanding history of fatigue.  She reports recent hospitalization at [place] for bradycardia, hypotension and hypothermia.  She reported to her endocrinologist today for a follow-up appointment, and he wanted to direct admit her.  There were no beds, so she presented to the ED.  She denies any new symptoms and just reports her chronic fatigue.  Fatigue is worse with activity.  She states that she is on Vyvanse for her history of fatigue.  This use to improve her symptoms, but is no longer working.  In addition to fatigue, she has been experiencing chest pain intermittently over the past few months.  She reports an intermittent midsternal pressure.    1. Location: Generalized  2. Quality: Fatigue  3. Severity: Unable to complete normal activities  4. Worsening factors: Activity  5. Alleviating factors: Vyvanse used to improve symptoms however no longer does.  6. Onset: Months ago  7. Radiation: N/A  8. Frequency: Constant worsening  9. Co-morbidities: Past Medical History:  No date: ADHD (attention deficit hyperactivity disorder)  No date: Anxiety  No date: Back pain  No date: DDD (degenerative disc disease), lumbar      Comment:  mild  No date: Hyperlipidemia  No date: Hypothyroidism  No date: Splenic artery aneurysm (HCC)      Comment:  coiled since 9/17/21      History provided by:  Patient   used: No        Review of Systems    Past Medical History:   Diagnosis Date   • ADHD (attention deficit hyperactivity disorder)    • Anxiety    • Back pain    • DDD (degenerative disc disease), lumbar     mild   • Hyperlipidemia    • Hypothyroidism    • Splenic artery aneurysm (HCC)     coiled since 9/17/21       Allergies   Allergen Reactions   • Atorvastatin Other (See Comments)   • Paxil [Paroxetine] Unknown - Low Severity     EPS   • Latex Rash       Past Surgical History:   Procedure Laterality Date    • ABDOMINAL SURGERY      coiling of spenic aneurysm   •  SECTION  2016 and 8/2018    x2   • ENDOSCOPY N/A 2021    Procedure: ESOPHAGOGASTRODUODENOSCOPY;  Surgeon: Jung Bob MD;  Location: [facility] ENDOSCOPY;  Service: Gastroenterology;  Laterality: N/A;  reflux, esophagitis, gastroparesis   • LAPAROSCOPIC SPLENECTOMY      artery aneurysym repair   • RADIOFREQUENCY ABLATION  2019   • TONSILLECTOMY  2018   • TONSILLECTOMY         Family History   Problem Relation Age of Onset   • Cancer Maternal Grandmother    • Hypertension Paternal Grandmother    • Heart disease Paternal Grandmother    • Hyperlipidemia Paternal Grandmother    • Depression Sister    • Depression Brother    • Drug abuse Brother        Social History     Socioeconomic History   • Marital status:    • Number of children: 8   • Highest education level: Master's degree (e.g., MA, MS, Jaclyn, MEd, MSW, VENKAT)   Tobacco Use   • Smoking status: Never   • Smokeless tobacco: Never   Vaping Use   • Vaping Use: Never used   Substance and Sexual Activity   • Alcohol use: No   • Drug use: No   • Sexual activity: Defer           Objective   Physical Exam  Vitals and nursing note reviewed.   Constitutional:       General: She is awake. She is not in acute distress.     Appearance: She is well-developed and normal weight. She is ill-appearing. She is not toxic-appearing or diaphoretic.   HENT:      Head: Normocephalic and atraumatic.      Nose: Nose normal.      Mouth/Throat:      Mouth: Mucous membranes are moist.   Eyes:      Extraocular Movements: Extraocular movements intact.      Conjunctiva/sclera: Conjunctivae normal.      Pupils: Pupils are equal, round, and reactive to light.   Neck:      Thyroid: No thyromegaly.      Vascular: No JVD.      Trachea: No tracheal deviation.   Cardiovascular:      Rate and Rhythm: Normal rate and regular rhythm.      Pulses: Normal pulses.           Radial pulses are 2+ on the right side  and 2+ on the left side.        Popliteal pulses are 2+ on the right side and 2+ on the left side.        Dorsalis pedis pulses are 2+ on the right side and 2+ on the left side.      Heart sounds: Normal heart sounds, S1 normal and S2 normal. No murmur heard.    No friction rub. No gallop.   Pulmonary:      Effort: Pulmonary effort is normal. No respiratory distress.      Breath sounds: Normal breath sounds. No wheezing or rales.   Chest:      Chest wall: No tenderness.   Abdominal:      General: Bowel sounds are normal. There is no distension.      Palpations: Abdomen is soft. There is no mass.      Tenderness: There is no abdominal tenderness. There is no guarding or rebound.      Hernia: No hernia is present.   Musculoskeletal:      Cervical back: Normal range of motion and neck supple.   Skin:     General: Skin is warm and dry.      Capillary Refill: Capillary refill takes less than 2 seconds.   Neurological:      Mental Status: She is alert and oriented to person, place, and time.      GCS: GCS eye subscore is 4. GCS verbal subscore is 5. GCS motor subscore is 6.      Sensory: Sensation is intact.      Motor: Motor function is intact.      Coordination: Coordination is intact.   Psychiatric:         Behavior: Behavior normal. Behavior is cooperative.         Thought Content: Thought content normal.         Judgment: Judgment normal.         Procedures     Sinus rhythm with small inferior lateral Q waves rate 92.  Compared to previous EKG with no significant changes.  7/2/2022 sinus rhythm borderline T wave abnormalities rate of 72.  Interpreted by Dr. Ureña and reviewed by me.      ED Course  ED Course as of 01/03/23 1606   Tue Jan 03, 2023   1512 Awaiting callback from Dr. Banks. [AL]      ED Course User Index  [AL] Marlen Merrill, PAUL      XR Chest 1 View    Result Date: 1/3/2023  Impression: No acute cardiopulmonary process. Electronically Signed: Edgar Manley  1/3/2023 1:01 PM EST  Workstation ID:  [id number]    Medications   sodium chloride 0.9 % flush 10 mL (has no administration in time range)   lactated ringers bolus 1,000 mL (0 mL Intravenous Stopped 1/3/23 1606)     Labs Reviewed   COMPREHENSIVE METABOLIC PANEL - Abnormal; Notable for the following components:       Result Value    Creatinine 1.32 (*)     Anion Gap 16.0 (*)     eGFR 52.8 (*)     All other components within normal limits    Narrative:     GFR Normal >60  Chronic Kidney Disease <60  Kidney Failure <15     CBC WITH AUTO DIFFERENTIAL - Abnormal; Notable for the following components:    WBC 11.20 (*)     Hematocrit 47.2 (*)     MCHC 31.3 (*)     Neutrophils, Absolute 7.40 (*)     All other components within normal limits   TROPONIN (IN-HOUSE) - Normal    Narrative:     Troponin T Reference Range:  <= 0.03 ng/mL-   Negative for AMI  >0.03 ng/mL-     Abnormal for myocardial necrosis.  Clinicians would have to utilize clinical acumen, EKG, Troponin and serial changes to determine if it is an Acute Myocardial Infarction or myocardial injury due to an underlying chronic condition.       Results may be falsely decreased if patient taking Biotin.     BNP (IN-HOUSE) - Normal    Narrative:     Among patients with dyspnea, NT-proBNP is highly sensitive for the detection of acute congestive heart failure. In addition NT-proBNP of <300 pg/ml effectively rules out acute congestive heart failure with 99% negative predictive value.     D-DIMER, QUANTITATIVE - Normal    Narrative:     According to the assay 's published package insert, a normal (<0.50 mg/L (FEU)) D-dimer result in conjunction with a non-high clinical probability assessment, excludes deep vein thrombosis (DVT) and pulmonary embolism (PE) with high sensitivity.    D-dimer values increase with age and this can make VTE exclusion of an older population difficult. To address this, the American College of Physicians, based on best available evidence and recent guidelines, recommends that  clinicians use age-adjusted D-dimer thresholds in patients greater than 50 years of age with: a) a low probability of PE who do not meet all Pulmonary Embolism Rule Out Criteria, or b) in those with intermediate probability of PE.   The formula for an age-adjusted D-dimer cut-off is \"age/100\".  For example, a 60 year old patient would have an age-adjusted cut-off of 0.60 mg/L (FEU) and an 80 year old 0.80 mg/L (FEU).   TSH - Normal   MAGNESIUM - Normal   T4, FREE - Normal    Narrative:     Results may be falsely increased if patient taking Biotin.     PREGNANCY, URINE - Normal   URINALYSIS W/ MICROSCOPIC IF INDICATED (NO CULTURE) - Normal    Narrative:     Urine microscopic not indicated.   CORTISOL    Narrative:     Cortisol Reference Ranges:    Cortisol 6AM - 10AM Range: 6.02-18.40 mcg/dl  Cortisol 4PM - 8PM Range: 2.68-10.50 mcg/dl      Results may be falsely increased if patient taking Biotin.     CBC AND DIFFERENTIAL    Narrative:     The following orders were created for panel order CBC & Differential.  Procedure                               Abnormality         Status                     ---------                               -----------         ------                     CBC Auto Differential[[id number]]        Abnormal            Final result                 Please view results for these tests on the individual orders.                                          Medical Decision Making  Chart Review: Today, 1/3/2023 patient was seen at endocrinology and was sent to the ER for direct admit.  See note below.  12/28/2022 patient was admitted at [place], see note below.  Comorbidity: Past Medical History:  No date: ADHD (attention deficit hyperactivity disorder)  No date: Anxiety  No date: Back pain  No date: DDD (degenerative disc disease), lumbar      Comment:  mild  No date: Hyperlipidemia  No date: Hypothyroidism  No date: Splenic artery aneurysm (HCC)      Comment:  coiled since 9/17/21  Imaging: Was  interpreted by physician and reviewed by myself: XR Chest 1 View   Final Result    Impression:    No acute cardiopulmonary process.        Electronically Signed: Edgar Vineet      1/3/2023 1:01 PM EST      Workstation ID: [id number]       Patient undressed and placed in gown for exam.  Appropriate PPE worn during patient exam.  Patient is alert and oriented x3.  No acute distress.  Regular rate and rhythm.  Lungs are clear to auscultation bilaterally IV established and labs obtained.  Cardiac work-up initiated and chest x-ray obtained with the above findings.Labs reviewed.  CBC with white count 11.2, hemoglobin 14.8, platelets 294.  CBC with creatinine of 1.32 and GFR 52.8.  BNP, cortisol, TSH, T4, cardiac enzymes, magnesium D-dimer all within normal limits.  UA unremarkable.  EKG as above.  No acute findings on chest x-ray. Patient will be placed in the ED observation unit for rehydration and recheck labs in the AM.     Disposition/Treatment: Discussed results with patient, verbalized understanding.   Agreeable with plan of care.  Patient was stable upon being placed in ED observation unit.      Differential Diagnoses, not all-inclusive and does not constitute entirety of all causes: Dehydration, Thiells's crisis, PE.    Part of this note may be an electronic transcription/translation of spoken language to printed text using the Dragon Dictation System.       History of Thiells's disease: acute illness or injury  Hypotension due to hypovolemia: acute illness or injury  Amount and/or Complexity of Data Reviewed  External Data Reviewed: notes.     Details: ASSESSMENT & PLAN   Principal Problem:  Bradycardia (12/28/2022) POA: Unknown    Bradycardia, hypotension, hypothermia  - concern for BB poisoning, but I am skeptical.   - check TSH, cortisol  - IVF bolus and mIVF  - glucagon prn as she previously responded to this    Abdominal cramping - imaging unremarkable at [place].  -oral pain medication for now,  monitor    Weight loss - possibly related to jorge, hypotension? W/u as above    Suicidal ideation? I do not think she is suicidal. She is future oriented, has no thoughts or indication of self harm. OK to stop precautions.    DVT ppx: scd  FEN: DIET NPO Now - Order Details:  LDA: piv  Dispo / DC planning: ICU  Code: No Order    Myles Tolbert MD  12/28/2022  10:34 PM  [phone]    Labs: ordered. Decision-making details documented in ED Course.  Radiology: ordered. Decision-making details documented in ED Course.  ECG/medicine tests: ordered. Decision-making details documented in ED Course.  Discussion of management or test interpretation with external provider(s): Spoke with Dr. Banks, Endocrinologist who would like patient admitted for IVFs due to orthostatic hypotension in the office.     Risk  Prescription drug management.  Decision regarding hospitalization.          Final diagnoses:   Hypotension due to hypovolemia   History of Mandeep's disease       ED Disposition  ED Disposition     ED Disposition   Decision to Admit    Condition   --    Comment   --             No follow-up provider specified.       Medication List      No changes were made to your prescriptions during this visit.          Marlen Merrill, APRN  01/03/23 1606

## 2023-01-03 NOTE — NURSING NOTE
PICC Team Note:  Ultrasound guided 20g PIV placed in left upper arm x1 attempt without incident. Patient tolerated well. RN notified.

## 2023-01-03 NOTE — TELEPHONE ENCOUNTER
Pt seen & sent to [facility] ER. Stim test ordered. Will send for scheduling when pt discharged.

## 2023-01-03 NOTE — CASE MANAGEMENT/SOCIAL WORK
Discharge Planning Assessment  [facility]     Patient Name: Renae Tang  MRN: [id number]  Today's Date: 1/3/2023    Admit Date: 1/3/2023    Plan: Return Home   Discharge Needs Assessment     Row Name 01/03/23 1619       Living Environment    People in Home significant other    Current Living Arrangements home    Primary Care Provided by spouse/significant other    Provides Primary Care For no one    Family Caregiver if Needed spouse    Quality of Family Relationships helpful;involved;supportive    Able to Return to Prior Arrangements yes       Resource/Environmental Concerns    Resource/Environmental Concerns none    Transportation Concerns none       Transition Planning    Patient/Family Anticipates Transition to home with family    Transportation Anticipated family or friend will provide  spouse       Discharge Needs Assessment    Readmission Within the Last 30 Days no previous admission in last 30 days    Equipment Currently Used at Home none    Concerns to be Addressed no discharge needs identified               Discharge Plan     Row Name 01/03/23 1621       Plan    Plan Return Home    Plan Comments Spoke with patient at bedside,IADL's, Confirmed PCP and Pharmacy. No transportation or prescription coverage issues.                 Demographic Summary     Row Name 01/03/23 1619       General Information    Admission Type observation    Arrived From emergency department    Reason for Consult discharge planning    Preferred Language English               Functional Status     Row Name 01/03/23 1619       Functional Status    Usual Activity Tolerance good    Current Activity Tolerance good       Functional Status, IADL    Medications independent    Meal Preparation independent    Housekeeping independent    Laundry independent    Shopping independent       Mental Status    General Appearance WDL WDL            Met with patient in room wearing PPE: mask, face shield/goggles, gloves, gown.      Maintained distance  greater than six feet and spent less than 15 minutes in the room.        Savanah Sanchez RN

## 2023-01-04 VITALS
RESPIRATION RATE: 15 BRPM | TEMPERATURE: 98.4 F | HEIGHT: 66 IN | BODY MASS INDEX: 26.04 KG/M2 | DIASTOLIC BLOOD PRESSURE: 72 MMHG | SYSTOLIC BLOOD PRESSURE: 106 MMHG | OXYGEN SATURATION: 99 % | WEIGHT: 162.04 LBS | HEART RATE: 74 BPM

## 2023-01-04 LAB
ANION GAP SERPL CALCULATED.3IONS-SCNC: 12 MMOL/L (ref 5–15)
BASOPHILS # BLD AUTO: 0 10*3/MM3 (ref 0–0.2)
BASOPHILS NFR BLD AUTO: 0.4 % (ref 0–1.5)
BUN SERPL-MCNC: 19 MG/DL (ref 6–20)
BUN/CREAT SERPL: 16.2 (ref 7–25)
CALCIUM SPEC-SCNC: 8.8 MG/DL (ref 8.6–10.5)
CHLORIDE SERPL-SCNC: 101 MMOL/L (ref 98–107)
CO2 SERPL-SCNC: 25 MMOL/L (ref 22–29)
CORTIS SERPL-MCNC: 10.63 MCG/DL
CORTIS SERPL-MCNC: 23.48 MCG/DL
CORTIS SERPL-MCNC: 28.46 MCG/DL
CREAT SERPL-MCNC: 1.17 MG/DL (ref 0.57–1)
DEPRECATED RDW RBC AUTO: 43.8 FL (ref 37–54)
EGFRCR SERPLBLD CKD-EPI 2021: 61 ML/MIN/1.73
EOSINOPHIL # BLD AUTO: 0.2 10*3/MM3 (ref 0–0.4)
EOSINOPHIL NFR BLD AUTO: 3.2 % (ref 0.3–6.2)
ERYTHROCYTE [DISTWIDTH] IN BLOOD BY AUTOMATED COUNT: 13.4 % (ref 12.3–15.4)
GLUCOSE SERPL-MCNC: 105 MG/DL (ref 65–99)
HCT VFR BLD AUTO: 38.6 % (ref 34–46.6)
HGB BLD-MCNC: 12.3 G/DL (ref 12–15.9)
LYMPHOCYTES # BLD AUTO: 3 10*3/MM3 (ref 0.7–3.1)
LYMPHOCYTES NFR BLD AUTO: 39.7 % (ref 19.6–45.3)
MCH RBC QN AUTO: 30 PG (ref 26.6–33)
MCHC RBC AUTO-ENTMCNC: 31.9 G/DL (ref 31.5–35.7)
MCV RBC AUTO: 93.9 FL (ref 79–97)
MONOCYTES # BLD AUTO: 0.4 10*3/MM3 (ref 0.1–0.9)
MONOCYTES NFR BLD AUTO: 5.9 % (ref 5–12)
NEUTROPHILS NFR BLD AUTO: 3.8 10*3/MM3 (ref 1.7–7)
NEUTROPHILS NFR BLD AUTO: 50.8 % (ref 42.7–76)
NRBC BLD AUTO-RTO: 0.1 /100 WBC (ref 0–0.2)
PLATELET # BLD AUTO: 263 10*3/MM3 (ref 140–450)
PMV BLD AUTO: 8.9 FL (ref 6–12)
POTASSIUM SERPL-SCNC: 3.6 MMOL/L (ref 3.5–5.2)
RBC # BLD AUTO: 4.11 10*6/MM3 (ref 3.77–5.28)
SODIUM SERPL-SCNC: 138 MMOL/L (ref 136–145)
TROPONIN T SERPL-MCNC: <0.01 NG/ML (ref 0–0.03)
WBC NRBC COR # BLD: 7.5 10*3/MM3 (ref 3.4–10.8)

## 2023-01-04 PROCEDURE — 25010000002 INFLUENZA VAC SPLIT QUAD 0.5 ML SUSPENSION PREFILLED SYRINGE: Performed by: INTERNAL MEDICINE

## 2023-01-04 PROCEDURE — G0378 HOSPITAL OBSERVATION PER HR: HCPCS

## 2023-01-04 PROCEDURE — 82533 TOTAL CORTISOL: CPT | Performed by: INTERNAL MEDICINE

## 2023-01-04 PROCEDURE — 80048 BASIC METABOLIC PNL TOTAL CA: CPT | Performed by: NURSE PRACTITIONER

## 2023-01-04 PROCEDURE — 36415 COLL VENOUS BLD VENIPUNCTURE: CPT | Performed by: INTERNAL MEDICINE

## 2023-01-04 PROCEDURE — 84484 ASSAY OF TROPONIN QUANT: CPT | Performed by: PHYSICIAN ASSISTANT

## 2023-01-04 PROCEDURE — 90686 IIV4 VACC NO PRSV 0.5 ML IM: CPT | Performed by: INTERNAL MEDICINE

## 2023-01-04 PROCEDURE — G0008 ADMIN INFLUENZA VIRUS VAC: HCPCS | Performed by: INTERNAL MEDICINE

## 2023-01-04 PROCEDURE — 82024 ASSAY OF ACTH: CPT | Performed by: INTERNAL MEDICINE

## 2023-01-04 PROCEDURE — 99214 OFFICE O/P EST MOD 30 MIN: CPT | Performed by: INTERNAL MEDICINE

## 2023-01-04 PROCEDURE — 85025 COMPLETE CBC W/AUTO DIFF WBC: CPT | Performed by: NURSE PRACTITIONER

## 2023-01-04 PROCEDURE — 25010000002 COSYNTROPIN PER 0.25 MG: Performed by: INTERNAL MEDICINE

## 2023-01-04 RX ORDER — FAMOTIDINE 20 MG/1
40 TABLET, FILM COATED ORAL DAILY
Status: DISCONTINUED | OUTPATIENT
Start: 2023-01-04 | End: 2023-01-04 | Stop reason: HOSPADM

## 2023-01-04 RX ORDER — ASPIRIN 81 MG/1
81 TABLET, CHEWABLE ORAL DAILY
Status: DISCONTINUED | OUTPATIENT
Start: 2023-01-04 | End: 2023-01-04 | Stop reason: HOSPADM

## 2023-01-04 RX ORDER — CLONAZEPAM 1 MG/1
1 TABLET ORAL 2 TIMES DAILY PRN
Status: DISCONTINUED | OUTPATIENT
Start: 2023-01-04 | End: 2023-01-04

## 2023-01-04 RX ORDER — HYDROXYZINE HYDROCHLORIDE 25 MG/1
50 TABLET, FILM COATED ORAL 3 TIMES DAILY PRN
Status: DISCONTINUED | OUTPATIENT
Start: 2023-01-04 | End: 2023-01-04 | Stop reason: HOSPADM

## 2023-01-04 RX ADMIN — COSYNTROPIN 0.25 MG: 0.25 INJECTION, POWDER, LYOPHILIZED, FOR SOLUTION INTRAVENOUS at 08:35

## 2023-01-04 RX ADMIN — INFLUENZA VIRUS VACCINE 0.5 ML: 15; 15; 15; 15 SUSPENSION INTRAMUSCULAR at 18:30

## 2023-01-04 RX ADMIN — ASPIRIN 81 MG: 81 TABLET, CHEWABLE ORAL at 10:36

## 2023-01-04 RX ADMIN — FAMOTIDINE 40 MG: 20 TABLET, FILM COATED ORAL at 10:36

## 2023-01-04 RX ADMIN — SODIUM CHLORIDE, POTASSIUM CHLORIDE, SODIUM LACTATE AND CALCIUM CHLORIDE 125 ML/HR: 600; 310; 30; 20 INJECTION, SOLUTION INTRAVENOUS at 09:38

## 2023-01-04 NOTE — PLAN OF CARE
Goal Outcome Evaluation:  Plan of Care Reviewed With: patient        Progress: no change  Outcome Evaluation: Pt rested throughout the night without complaints continues on IVF's Pt is scheduled to have ACTH test this am will await further orders

## 2023-01-04 NOTE — PLAN OF CARE
Goal Outcome Evaluation:           Progress: improving  Outcome Evaluation: Patient had a cortisol test this morning. IV fluids running, patient stable, awaiting Dr. Banks to see patient to see if she can d/c. Will continue to monitor.

## 2023-01-04 NOTE — CASE MANAGEMENT/SOCIAL WORK
Continued Stay Note   [facility]     Patient Name: Renae Tang  MRN: [id number]  Today's Date: 1/4/2023    Admit Date: 1/3/2023    Plan: DC Plan: Anticipate Routine Home with Family.   Discharge Plan     Row Name 01/04/23 1147       Plan    Plan DC Plan: Anticipate Routine Home with Family.    Provided Post Acute Provider List? N/A    Provided Post Acute Provider Quality & Resource List? N/A    Plan Comments CM reviewed chart documentation to obtain clinical updates. Plan for ACTH test today. Dishcharge plan pending those results. CM will continue to follow for any further needs and adjust discharge plan accordingly. DC Barriers: Lab test results.           Expected Discharge Date and Time     Expected Discharge Date Expected Discharge Time    Jan 4, 2023         Phone communication or documentation only- no physical contact with patient or family.      Daphnie Rangel RN     Office Phone: [phone]  Office Cell:     [phone]

## 2023-01-04 NOTE — DISCHARGE SUMMARY
[facility]    Art Parker MD    CHIEF COMPLAINT:     Fatigue, lightheadedness and weight loss    HISTORY OF PRESENT ILLNESS:    Obtained from ED provider HPI on 1/3/2023:  History of Present Illness  Patient is a 37-year-old female who presents today with a longstanding history of fatigue.  She reports recent hospitalization at [facility] for bradycardia, hypotension and hypothermia.  She reported to her endocrinologist today for a follow-up appointment, and he wanted to direct admit her.  There were no beds, so she presented to the ED.  She denies any new symptoms and just reports her chronic fatigue.  Fatigue is worse with activity.  She states that she is on Vyvanse for her history of fatigue.  This use to improve her symptoms, but is no longer working.  In addition to fatigue, she has been experiencing chest pain intermittently over the past few months.  She reports an intermittent midsternal pressure.    1/4/2023:  Patient confirms the HPI noted above including several week history of increasing fatigue as well as lightheadedness especially with position changes and approximately 60 pound weight loss over the past year.  She notes her fatigue is somewhat chronic though worsening and she was recently admitted to [facility] and discharged with plans for outpatient follow-up with endocrinology.  She went to her endocrinologist office on 1/3/2023 and was noted to have significant orthostatic hypotension and reactive tachycardia in addition to the symptoms noted above.  She was subsequently sent to the ED emergency department for further evaluation some mild left upper quadrant as well as left chest pain is reported described as a stabbing pain as well as some dyspnea with exertion.  Patient denies any nausea, vomiting, cough, fever, peripheral edema or palpitations        Past Medical History:   Diagnosis Date   • ADHD (attention deficit hyperactivity disorder)    • Anxiety     • Back pain    • DDD (degenerative disc disease), lumbar     mild   • Hyperlipidemia    • Hypothyroidism    • Splenic artery aneurysm (HCC)     coiled since 19     Past Surgical History:   Procedure Laterality Date   • ABDOMINAL SURGERY      coiling of spenic aneurysm   •  SECTION  2016 and 8/2018    x2   • ENDOSCOPY N/A 2021    Procedure: ESOPHAGOGASTRODUODENOSCOPY;  Surgeon: Jung Bob MD;  Location: [facility] ENDOSCOPY;  Service: Gastroenterology;  Laterality: N/A;  reflux, esophagitis, gastroparesis   • LAPAROSCOPIC SPLENECTOMY      artery aneurysym repair   • RADIOFREQUENCY ABLATION  2019   • TONSILLECTOMY  2018   • TONSILLECTOMY       Family History   Problem Relation Age of Onset   • Cancer Maternal Grandmother    • Hypertension Paternal Grandmother    • Heart disease Paternal Grandmother    • Hyperlipidemia Paternal Grandmother    • Depression Sister    • Depression Brother    • Drug abuse Brother      Social History     Tobacco Use   • Smoking status: Never   • Smokeless tobacco: Never   Vaping Use   • Vaping Use: Never used   Substance Use Topics   • Alcohol use: No   • Drug use: No     Medications Prior to Admission   Medication Sig Dispense Refill Last Dose   • aspirin 81 MG chewable tablet Chew 81 mg Daily.   Past Week   • clonazePAM (KlonoPIN) 1 MG tablet Take 1 mg by mouth 2 (Two) Times a Day As Needed.   2023   • Cyanocobalamin (Vitamin B-12) 1000 MCG sublingual tablet Place 1 tablet under the tongue Daily. 100 each 4 Past Month   • famotidine (PEPCID) 40 MG tablet Take 40 mg by mouth Daily.   2023   • meloxicam (MOBIC) 15 MG tablet Take 15 mg by mouth As Needed.   Past Week   • Vyvanse 50 MG capsule Take 50 mg by mouth As Needed     2023   • EPINEPHrine (EPIPEN) 0.3 MG/0.3ML solution auto-injector injection See Admin Instructions.   More than a month   • medroxyPROGESTERone (PROVERA) 5 MG tablet Take 10 mg by mouth As Needed.   More than a month      Allergies:  Atorvastatin, Paxil [paroxetine], and Latex    Immunization History   Administered Date(s) Administered   • Tdap 12/10/2015           REVIEW OF SYSTEMS:    Review of Systems   Constitutional: Positive for malaise/fatigue and weight loss.   HENT: Negative.    Eyes: Negative.    Cardiovascular: Positive for chest pain and dyspnea on exertion.   Respiratory: Negative.    Skin: Negative.    Musculoskeletal: Negative.    Gastrointestinal: Negative.    Genitourinary: Negative.    Neurological: Positive for light-headedness.   Psychiatric/Behavioral: Negative.        Vital Signs  Temp:  [97.6 °F (36.4 °C)-98.1 °F (36.7 °C)] 98.1 °F (36.7 °C)  Heart Rate:  [60-75] 60  Resp:  [15-16] 15  BP: ()/(50-70) 90/50          Physical Exam:  Physical Exam  Constitutional:       General: She is not in acute distress.     Appearance: Normal appearance. She is not ill-appearing, toxic-appearing or diaphoretic.   HENT:      Head: Normocephalic.      Right Ear: External ear normal.      Left Ear: External ear normal.      Nose: Nose normal.      Mouth/Throat:      Mouth: Mucous membranes are moist.   Eyes:      Extraocular Movements: Extraocular movements intact.   Cardiovascular:      Rate and Rhythm: Normal rate and regular rhythm.      Pulses: Normal pulses.   Pulmonary:      Effort: Pulmonary effort is normal.      Breath sounds: Normal breath sounds.   Abdominal:      General: Bowel sounds are normal.      Palpations: Abdomen is soft.   Musculoskeletal:      Cervical back: Normal range of motion.      Right lower leg: No edema.      Left lower leg: No edema.   Skin:     General: Skin is warm and dry.      Capillary Refill: Capillary refill takes less than 2 seconds.   Neurological:      General: No focal deficit present.      Mental Status: She is alert.   Psychiatric:         Mood and Affect: Mood normal.         Behavior: Behavior normal.         Thought Content: Thought content normal.         Judgment:  Judgment normal.         Emotional Behavior:   Normal   Debilities:  None  Results Review:    I reviewed the patient's new clinical results.  Lab Results (most recent)     Procedure Component Value Units Date/Time    Cortisol [[id number]] Collected: 01/04/23 1010    Specimen: Blood Updated: 01/04/23 1018    Cortisol [[id number]] Collected: 01/04/23 0931    Specimen: Blood Updated: 01/04/23 0947    Basic Metabolic Panel [[id number]]  (Abnormal) Collected: 01/04/23 0833    Specimen: Blood from Arm, Right Updated: 01/04/23 0911     Glucose 105 mg/dL      BUN 19 mg/dL      Creatinine 1.17 mg/dL      Sodium 138 mmol/L      Potassium 3.6 mmol/L      Chloride 101 mmol/L      CO2 25.0 mmol/L      Calcium 8.8 mg/dL      BUN/Creatinine Ratio 16.2     Anion Gap 12.0 mmol/L      eGFR 61.0 mL/min/1.73      Comment: National Kidney Foundation and American Society of Nephrology (ASN) Task Force recommended calculation based on the Chronic Kidney Disease Epidemiology Collaboration (CKD-EPI) equation refit without adjustment for race.       Narrative:      GFR Normal >60  Chronic Kidney Disease <60  Kidney Failure <15      CBC & Differential [[id number]]  (Normal) Collected: 01/04/23 0833    Specimen: Blood from Arm, Right Updated: 01/04/23 0856    Narrative:      The following orders were created for panel order CBC & Differential.  Procedure                               Abnormality         Status                     ---------                               -----------         ------                     CBC Auto Differential[[id number]]        Normal              Final result                 Please view results for these tests on the individual orders.    CBC Auto Differential [[id number]]  (Normal) Collected: 01/04/23 0833    Specimen: Blood from Arm, Right Updated: 01/04/23 0856     WBC 7.50 10*3/mm3      RBC 4.11 10*6/mm3      Hemoglobin 12.3 g/dL      Hematocrit 38.6 %      MCV 93.9 fL      MCH 30.0 pg      MCHC 31.9 g/dL      RDW  13.4 %      RDW-SD 43.8 fl      MPV 8.9 fL      Platelets 263 10*3/mm3      Neutrophil % 50.8 %      Lymphocyte % 39.7 %      Monocyte % 5.9 %      Eosinophil % 3.2 %      Basophil % 0.4 %      Neutrophils, Absolute 3.80 10*3/mm3      Lymphocytes, Absolute 3.00 10*3/mm3      Monocytes, Absolute 0.40 10*3/mm3      Eosinophils, Absolute 0.20 10*3/mm3      Basophils, Absolute 0.00 10*3/mm3      nRBC 0.1 /100 WBC     ACTH [[id number]] Collected: 01/04/23 0833    Specimen: Blood from Arm, Right Updated: 01/04/23 0844    Comprehensive Metabolic Panel [[id number]]  (Abnormal) Collected: 01/03/23 1336    Specimen: Blood Updated: 01/03/23 1427     Glucose 98 mg/dL      BUN 18 mg/dL      Creatinine 1.32 mg/dL      Sodium 139 mmol/L      Potassium 4.1 mmol/L      Comment: Slight hemolysis detected by analyzer. Results may be affected.        Chloride 99 mmol/L      CO2 24.0 mmol/L      Calcium 9.9 mg/dL      Total Protein 8.1 g/dL      Albumin 5.0 g/dL      ALT (SGPT) 15 U/L      AST (SGOT) 16 U/L      Comment: Slight hemolysis detected by analyzer. Results may be affected.        Alkaline Phosphatase 70 U/L      Total Bilirubin 0.3 mg/dL      Globulin 3.1 gm/dL      A/G Ratio 1.6 g/dL      BUN/Creatinine Ratio 13.6     Anion Gap 16.0 mmol/L      eGFR 52.8 mL/min/1.73      Comment: National Kidney Foundation and American Society of Nephrology (ASN) Task Force recommended calculation based on the Chronic Kidney Disease Epidemiology Collaboration (CKD-EPI) equation refit without adjustment for race.       Narrative:      GFR Normal >60  Chronic Kidney Disease <60  Kidney Failure <15      BNP [[id number]]  (Normal) Collected: 01/03/23 1336    Specimen: Blood Updated: 01/03/23 1414     proBNP 57.3 pg/mL     Narrative:      Among patients with dyspnea, NT-proBNP is highly sensitive for the detection of acute congestive heart failure. In addition NT-proBNP of <300 pg/ml effectively rules out acute congestive heart failure with 99%  negative predictive value.      TSH [[id number]]  (Normal) Collected: 01/03/23 1336    Specimen: Blood Updated: 01/03/23 1414     TSH 2.250 uIU/mL     Troponin [[id number]]  (Normal) Collected: 01/03/23 1336    Specimen: Blood Updated: 01/03/23 1414     Troponin T <0.010 ng/mL     Narrative:      Troponin T Reference Range:  <= 0.03 ng/mL-   Negative for AMI  >0.03 ng/mL-     Abnormal for myocardial necrosis.  Clinicians would have to utilize clinical acumen, EKG, Troponin and serial changes to determine if it is an Acute Myocardial Infarction or myocardial injury due to an underlying chronic condition.       Results may be falsely decreased if patient taking Biotin.      T4, Free [[id number]]  (Normal) Collected: 01/03/23 1336    Specimen: Blood Updated: 01/03/23 1414     Free T4 1.24 ng/dL     Narrative:      Results may be falsely increased if patient taking Biotin.      Magnesium [[id number]]  (Normal) Collected: 01/03/23 1336    Specimen: Blood Updated: 01/03/23 1410     Magnesium 1.9 mg/dL     D-dimer, Quantitative [[id number]]  (Normal) Collected: 01/03/23 1336    Specimen: Blood Updated: 01/03/23 1355     D-Dimer, Quantitative 0.31 mg/L (FEU)     Narrative:      According to the assay 's published package insert, a normal (<0.50 mg/L (FEU)) D-dimer result in conjunction with a non-high clinical probability assessment, excludes deep vein thrombosis (DVT) and pulmonary embolism (PE) with high sensitivity.    D-dimer values increase with age and this can make VTE exclusion of an older population difficult. To address this, the American College of Physicians, based on best available evidence and recent guidelines, recommends that clinicians use age-adjusted D-dimer thresholds in patients greater than 50 years of age with: a) a low probability of PE who do not meet all Pulmonary Embolism Rule Out Criteria, or b) in those with intermediate probability of PE.   The formula for an age-adjusted D-dimer cut-off  is \"age/100\".  For example, a 60 year old patient would have an age-adjusted cut-off of 0.60 mg/L (FEU) and an 80 year old 0.80 mg/L (FEU).    CBC & Differential [[id number]]  (Abnormal) Collected: 01/03/23 1254    Specimen: Blood Updated: 01/03/23 1305    Narrative:      The following orders were created for panel order CBC & Differential.  Procedure                               Abnormality         Status                     ---------                               -----------         ------                     CBC Auto Differential[[id number]]        Abnormal            Final result                 Please view results for these tests on the individual orders.    CBC Auto Differential [[id number]]  (Abnormal) Collected: 01/03/23 1254    Specimen: Blood Updated: 01/03/23 1305     WBC 11.20 10*3/mm3      RBC 4.92 10*6/mm3      Hemoglobin 14.8 g/dL      Hematocrit 47.2 %      MCV 96.0 fL      MCH 30.0 pg      MCHC 31.3 g/dL      RDW 14.4 %      RDW-SD 48.6 fl      MPV 9.7 fL      Platelets 294 10*3/mm3      Neutrophil % 66.4 %      Lymphocyte % 25.5 %      Monocyte % 5.2 %      Eosinophil % 2.5 %      Basophil % 0.4 %      Neutrophils, Absolute 7.40 10*3/mm3      Lymphocytes, Absolute 2.80 10*3/mm3      Monocytes, Absolute 0.60 10*3/mm3      Eosinophils, Absolute 0.30 10*3/mm3      Basophils, Absolute 0.00 10*3/mm3      nRBC 0.0 /100 WBC     Pregnancy, Urine - Urine, Clean Catch [[id number]]  (Normal) Collected: 01/03/23 1237    Specimen: Urine, Clean Catch Updated: 01/03/23 1243     HCG, Urine QL Negative    Urinalysis With Microscopic If Indicated (No Culture) - Urine, Clean Catch [[id number]]  (Normal) Collected: 01/03/23 1237    Specimen: Urine, Clean Catch Updated: 01/03/23 1243     Color, UA Yellow     Appearance, UA Clear     pH, UA <=5.0     Specific Gravity, UA 1.010     Glucose, UA Negative     Ketones, UA Negative     Bilirubin, UA Negative     Blood, UA Negative     Protein, UA Negative     Leuk Esterase, UA  Negative     Nitrite, UA Negative     Urobilinogen, UA 0.2 E.U./dL    Narrative:      Urine microscopic not indicated.          Imaging Results (Most Recent)     Procedure Component Value Units Date/Time    XR Chest 1 View [[id number]] Collected: 01/03/23 1300     Updated: 01/03/23 1303    Narrative:      XR CHEST 1 VW    Date of Exam: 1/3/2023 12:51 PM EST    Indication: fatigue.    Comparison: July 2, 2022    Findings:  The lungs are clear. The heart and mediastinal contours appear normal. There is no pleural effusion. The pulmonary vasculature appears normal. The osseous structures appear intact.      Impression:      Impression:  No acute cardiopulmonary process.    Electronically Signed: Edgar Manley    1/3/2023 1:01 PM EST    Workstation ID: [id number]        reviewed    ECG/EMG Results (most recent)     Procedure Component Value Units Date/Time    ECG 12 Lead Syncope [[id number]] Collected: 01/03/23 1238     Updated: 01/03/23 1239     QT Interval 366 ms     Narrative:      HEART RATE= 92  bpm  RR Interval= 656  ms  AZ Interval= 155  ms  P Horizontal Axis= 11  deg  P Front Axis= 27  deg  QRSD Interval= 79  ms  QT Interval= 366  ms  QRS Axis= 71  deg  T Wave Axis= 51  deg  - NORMAL ECG -  Sinus rhythm  When compared with ECG of 02-Jul-2022 11:50:19,  No significant change  Electronically Signed By:   Date and Time of Study: 2023-01-03 12:38:53        reviewed            Microbiology Results (last 10 days)     ** No results found for the last 240 hours. **          Assessment & Plan     Dehydration       Fatigue with lightheadedness  -Troponin less than 0.010, proBNP: 57.3  -Patient sent from endocrinologist office where she was noted to have significant orthostatic hypotension reactive tachycardia  -IV fluid bolus followed by infusion ordered in ED, continue  -Endocrinology consulted who ordered ACTH stimulation test  -BP has remained somewhat low but with no further symptoms reported2  -Continue telemetry and  fall precautions  -Endocrinology recommends outpt follow-up in 4-6 wks    Chest pain  -Troponin less than 0.010, trend  -proBNP: 57.3  -D-dimer: 0.31  -Chest x-ray showed no acute cardiopulmonary process  -EKG reported sinus rhythm at 92 without obvious acute changes though study not available for review at the time of my exam  -Continue aspirin and telemetry    MARTI  Lab Results   Component Value Date    CREATININE 1.17 (H) 01/04/2023    BUN 19 01/04/2023    BCR 16.2 01/04/2023   -IV fluids ordered in the ED  -Avoid nephrotoxic medication IV dye unless urgently needed  -Monitor BMP and I's and O's while admitted      Anxiety/depression  -Will hold benzo for now given hypotension, use as needed hydroxyzine    I discussed the patients findings and my recommendations with patient and nursing staff.     Discharge Diagnosis:      Dehydration      Hospital Course  Patient is a 37 y.o. female presented with fatigue with lightheadedness and recent admission to outlying facility for hypothermia with an HPI noted above.  Serial troponins were assessed remain less than 0.010 with proBNP also found to be within normal limits at 57.3.  Creatinine was elevated initially at 1.32.  She was given IV fluid bolus followed by infusion with subsequent improvement in creatinine to 1.17.  Patient blood pressure remained somewhat low through the majority of her admission.  Endocrinology was consulted who ordered ACTH stimulation test.  D-Dimer was noted within normal limits at 0.31 and chest x-ray showed no acute cardiopulmonary process.  EKG was reported as sinus rhythm at 92 without obvious acute changes.  She was continued on telemetry without significant events noted.  ACTH stimulation test was ordered and reviewed by endocrinology.  Pt BP has been somewhat low but stable with no further symptoms reported.  At this time patient felt to be in good condition for discharge with close follow-up with her PCP as well as endocrinology on an  outpatient basis.  Her full testing/results and plan were discussed with patient and with concerning/alarm symptoms for which to return to the ED..  She was instructed by endocrinologist to follow-up outpt in 4-6 wks.  All questions were answered and she verbalizes her understanding and agreement.    Past Medical History:     Past Medical History:   Diagnosis Date   • ADHD (attention deficit hyperactivity disorder)    • Anxiety    • Back pain    • DDD (degenerative disc disease), lumbar     mild   • Hyperlipidemia    • Hypothyroidism    • Splenic artery aneurysm (HCC)     coiled since 19       Past Surgical History:     Past Surgical History:   Procedure Laterality Date   • ABDOMINAL SURGERY      coiling of spenic aneurysm   •  SECTION  2016 and 8/2018    x2   • ENDOSCOPY N/A 2021    Procedure: ESOPHAGOGASTRODUODENOSCOPY;  Surgeon: Jung Bob MD;  Location: [facility] ENDOSCOPY;  Service: Gastroenterology;  Laterality: N/A;  reflux, esophagitis, gastroparesis   • LAPAROSCOPIC SPLENECTOMY      artery aneurysym repair   • RADIOFREQUENCY ABLATION  2019   • TONSILLECTOMY  2018   • TONSILLECTOMY         Social History:   Social History     Socioeconomic History   • Marital status:    • Number of children: 8   • Highest education level: Master's degree (e.g., MA, MS, Jaclyn, MEd, MSW, VENKAT)   Tobacco Use   • Smoking status: Never   • Smokeless tobacco: Never   Vaping Use   • Vaping Use: Never used   Substance and Sexual Activity   • Alcohol use: No   • Drug use: No   • Sexual activity: Defer       Procedures Performed         Consults:   Consults     Date and Time Order Name Status Description    1/3/2023  3:11 PM Family Medicine Consult            Condition on Discharge:     Stable    Discharge Disposition  Home or Self Care    Discharge Medications     Discharge Medications      Continue These Medications      Instructions Start Date   aspirin 81 MG chewable tablet   81 mg, Oral,  Daily      clonazePAM 1 MG tablet  Commonly known as: KlonoPIN   1 mg, Oral, 2 Times Daily PRN      EPINEPHrine 0.3 MG/0.3ML solution auto-injector injection  Commonly known as: EPIPEN   See Admin Instructions      famotidine 40 MG tablet  Commonly known as: PEPCID   40 mg, Oral, Daily      medroxyPROGESTERone 5 MG tablet  Commonly known as: PROVERA   10 mg, Oral, As Needed      meloxicam 15 MG tablet  Commonly known as: MOBIC   15 mg, Oral, As Needed      Vitamin B-12 1000 MCG sublingual tablet   1,000 mcg, Sublingual, Daily      Vyvanse 50 MG capsule  Generic drug: lisdexamfetamine   50 mg, Oral, As Needed             Discharge Diet:     Activity at Discharge:     Follow-up Appointments  No future appointments.  Additional Instructions for the Follow-ups that You Need to Schedule     Discharge Follow-up with PCP   As directed       Currently Documented PCP:    Art Parker MD    PCP Phone Number:    [phone]     Follow Up Details: 5 to 7 days         Discharge Follow-up with Specified Provider: Endocrinology; 6 Weeks   As directed      To: Endocrinology    Follow Up: 6 Weeks               Test Results Pending at Discharge  Pending Labs     Order Current Status    ACTH In process           Risk for Readmission (LACE) Score: 4 (1/4/2023  6:01 AM)          Andrea Alford PA-C  01/04/23  18:21 EST

## 2023-01-04 NOTE — CONSULTS
Inpatient Endocrine Consult  Consultation requested by ER team for low cortisol  Patient Care Team:  Art Parker MD as PCP - General (Family Medicine)    Chief Complaint: Low cortisol    HPI: This is a 37-year-old female with history of chronic fatigue was initially seen in the office yesterday with lethargy and fatigue and orthostatic hypotension she tells me that she was seen at the [facility] and admitted last month and was found to have low random cortisol.  In the office evaluation she was found to have orthostatic hypotension with reflex tachycardia and she was subsequently sent to the emergency room for evaluation followed by admission to the hospital.  During the work-up in the ER she was found to have elevated serum creatinine which was new, she is started on IV fluids and she is feeling better.  During this stay she has undergone ACTH stim test which was normal and her TSH was normal as well.  She is feeling better now.    Past Medical History:   Diagnosis Date   • ADHD (attention deficit hyperactivity disorder)    • Anxiety    • Back pain    • DDD (degenerative disc disease), lumbar     mild   • Hyperlipidemia    • Hypothyroidism    • Splenic artery aneurysm (HCC)     coiled since 9/17/21       Social History     Socioeconomic History   • Marital status:    • Number of children: 8   • Highest education level: Master's degree (e.g., MA, MS, Jaclyn, MEd, MSW, VENKAT)   Tobacco Use   • Smoking status: Never   • Smokeless tobacco: Never   Vaping Use   • Vaping Use: Never used   Substance and Sexual Activity   • Alcohol use: No   • Drug use: No   • Sexual activity: Defer       Family History   Problem Relation Age of Onset   • Cancer Maternal Grandmother    • Hypertension Paternal Grandmother    • Heart disease Paternal Grandmother    • Hyperlipidemia Paternal Grandmother    • Depression Sister    • Depression Brother    • Drug abuse Brother        Allergies   Allergen Reactions   •  Atorvastatin Other (See Comments)   • Paxil [Paroxetine] Unknown - Low Severity     EPS   • Latex Rash       ROS:   Constitutional:  Denies fatigue, tiredness.    Eyes:  Denies change in visual acuity   HENT:  Denies nasal congestion or sore throat   Respiratory: Denies cough, shortness of breath.   Cardiovascular:  Denies chest pain, edema   GI:  Denies abdominal pain, nausea, vomiting.   :  Denies polyuria and polydipsia  Musculoskeletal:  Denies back pain or joint pain   Integument:  Denies dry skin, rash   Neurologic:  Denies headache, focal weakness or sensory changes   Endocrine:  Denies polyuria or polydipsia   Psychiatric:  Denies depression or anxiety      Vitals:    01/04/23 1823   BP: 106/72   Pulse: 74   Resp: 15   Temp: 98.4 °F (36.9 °C)   SpO2: 99%      Body mass index is 26.15 kg/m².     Physical Exam:  GEN: NAD, conversant  EYES: EOMI, PERRL, no conjunctival erythema  NECK: no thyromegaly, full ROM   CV: RRR, no murmurs/rubs/gallops, no peripheral edema  LUNG: CTAB, no wheezes/rales/rhonchi  SKIN: no rashes, no acanthosis  MSK: no deformities, full ROM of all extremities  NEURO: no tremors, DTR normal  PSYCH: AOX3, appropriate mood, affect normal      Results Review:     I reviewed the patient's new clinical results.    Lab Results   Component Value Date    GLUCOSE 105 (H) 01/04/2023    BUN 19 01/04/2023    CREATININE 1.17 (H) 01/04/2023    EGFRIFNONA 61 02/23/2022    BCR 16.2 01/04/2023    K 3.6 01/04/2023    CO2 25.0 01/04/2023    CALCIUM 8.8 01/04/2023    ALBUMIN 5.0 01/03/2023    LABIL2 0.9 (L) 07/19/2018    AST 16 01/03/2023    ALT 15 01/03/2023       No results found for: HGBA1C  Lab Results   Component Value Date    CREATININE 1.17 (H) 01/04/2023     Results from last 7 days   Lab Units 12/29/22  1552 12/29/22  0801 12/28/22  2102   GLUCOSE mg/dL 101 91 88      Latest Reference Range & Units 01/03/23 13:36 01/04/23 08:33 01/04/23 09:31 01/04/23 10:10   Cortisol mcg/dL 10.69 10.63 23.48 28.46    TSH Baseline 0.270 - 4.200 uIU/mL 2.250      Free T4 0.93 - 1.70 ng/dL 1.24          Medication Review: Reviewed.       Current Facility-Administered Medications:   •  acetaminophen (TYLENOL) tablet 650 mg, 650 mg, Oral, Q4H PRN, Debbi Rivero APRN  •  aspirin chewable tablet 81 mg, 81 mg, Oral, Daily, Andrea Alford PA-C, 81 mg at 01/04/23 1036  •  polyethylene glycol (MIRALAX) packet 17 g, 17 g, Oral, Daily PRN **AND** bisacodyl (DULCOLAX) EC tablet 5 mg, 5 mg, Oral, Daily PRN **AND** bisacodyl (DULCOLAX) suppository 10 mg, 10 mg, Rectal, Daily PRN, Debbi Rivero APRN  •  famotidine (PEPCID) tablet 40 mg, 40 mg, Oral, Daily, Andrea Alford PA-C, 40 mg at 01/04/23 1036  •  hydrOXYzine (ATARAX) tablet 50 mg, 50 mg, Oral, TID PRN, Andrea Alford PA-C  •  lactated ringers infusion, 125 mL/hr, Intravenous, Continuous, Debbi Rivero APRN, Last Rate: 125 mL/hr at 01/04/23 0938, 125 mL/hr at 01/04/23 0938  •  ondansetron (ZOFRAN) tablet 4 mg, 4 mg, Oral, Q6H PRN **OR** ondansetron (ZOFRAN) injection 4 mg, 4 mg, Intravenous, Q6H PRN, Debbi Rivero APRN  •  [COMPLETED] Insert Peripheral IV, , , Once **AND** sodium chloride 0.9 % flush 10 mL, 10 mL, Intravenous, PRN, Marlen Merrill APRN  •  sodium chloride 0.9 % flush 10 mL, 10 mL, Intravenous, Q12H, Debbi Rivero APRN  •  sodium chloride 0.9 % flush 10 mL, 10 mL, Intravenous, PRN, Debbi Rivero APRN          Assessment and plan:  Orthostatic hypotension: Most likely due to dehydration, she is on fluids and feeling better.  Creatinine is improved.    Low cortisol: ACTH stimulation test normal therefore rules out [place]'s disease.    Thank you very much for the consultation and she can be discharged from the endocrine perspective.  We will follow-up in the office in the next few weeks.      Wili Banks MD FACE.

## 2023-01-05 LAB
ACTH PLAS-MCNC: 27 PG/ML (ref 7.2–63.3)
QT INTERVAL: 366 MS

## 2023-01-05 NOTE — TELEPHONE ENCOUNTER
Stim test and all labs except Vit D, B12, Ferritin and phosphorus done as hospital inpatient. Do you still want the rest of these labs done now? Pt discharged 1/4 and is to fu in 1 month per hospital after visit summary.

## 2023-01-05 NOTE — CASE MANAGEMENT/SOCIAL WORK
Case Management Discharge Note      Transportation Services  Private: Car (with family)    Final Discharge Disposition Code: 01 - home or self-care

## 2024-02-08 ENCOUNTER — APPOINTMENT (OUTPATIENT)
Dept: CT IMAGING | Facility: HOSPITAL | Age: 39
End: 2024-02-08
Payer: MEDICAID

## 2024-02-08 ENCOUNTER — HOSPITAL ENCOUNTER (EMERGENCY)
Facility: HOSPITAL | Age: 39
Discharge: HOME | End: 2024-02-08
Payer: MEDICAID

## 2024-02-08 VITALS
OXYGEN SATURATION: 99 % | HEART RATE: 80 BPM | HEIGHT: 66 IN | RESPIRATION RATE: 20 BRPM | TEMPERATURE: 98 F | SYSTOLIC BLOOD PRESSURE: 110 MMHG | WEIGHT: 152.78 LBS | BODY MASS INDEX: 24.55 KG/M2 | DIASTOLIC BLOOD PRESSURE: 80 MMHG

## 2024-02-08 DIAGNOSIS — Z87.898 H/O NAUSEA AND VOMITING: ICD-10-CM

## 2024-02-08 DIAGNOSIS — R10.84 GENERALIZED ABDOMINAL PAIN: Primary | ICD-10-CM

## 2024-02-08 LAB
ALBUMIN SERPL-MCNC: 4.7 G/DL (ref 3.5–5.2)
ALBUMIN/GLOB SERPL: 1.6 G/DL
ALP SERPL-CCNC: 51 U/L (ref 39–117)
ALT SERPL W P-5'-P-CCNC: 6 U/L (ref 1–33)
ANION GAP SERPL CALCULATED.3IONS-SCNC: 11 MMOL/L (ref 5–15)
AST SERPL-CCNC: 14 U/L (ref 1–32)
BACTERIA UR QL AUTO: NORMAL /HPF
BASOPHILS # BLD AUTO: 0.1 10*3/MM3 (ref 0–0.2)
BASOPHILS NFR BLD AUTO: 1 % (ref 0–1.5)
BILIRUB SERPL-MCNC: 0.4 MG/DL (ref 0–1.2)
BILIRUB UR QL STRIP: NEGATIVE
BUN SERPL-MCNC: 12 MG/DL (ref 6–20)
BUN/CREAT SERPL: 14.5 (ref 7–25)
CALCIUM SPEC-SCNC: 9.6 MG/DL (ref 8.6–10.5)
CHLORIDE SERPL-SCNC: 104 MMOL/L (ref 98–107)
CLARITY UR: CLEAR
CO2 SERPL-SCNC: 26 MMOL/L (ref 22–29)
COLOR UR: YELLOW
CREAT SERPL-MCNC: 0.83 MG/DL (ref 0.57–1)
DEPRECATED RDW RBC AUTO: 45.1 FL (ref 37–54)
EGFRCR SERPLBLD CKD-EPI 2021: 91.5 ML/MIN/1.73
EOSINOPHIL # BLD AUTO: 0.1 10*3/MM3 (ref 0–0.4)
EOSINOPHIL NFR BLD AUTO: 1.1 % (ref 0.3–6.2)
ERYTHROCYTE [DISTWIDTH] IN BLOOD BY AUTOMATED COUNT: 13.1 % (ref 12.3–15.4)
GLOBULIN UR ELPH-MCNC: 3 GM/DL
GLUCOSE SERPL-MCNC: 88 MG/DL (ref 65–99)
GLUCOSE UR STRIP-MCNC: NEGATIVE MG/DL
HCG SERPL QL: NEGATIVE
HCT VFR BLD AUTO: 38.8 % (ref 34–46.6)
HGB BLD-MCNC: 12.9 G/DL (ref 12–15.9)
HGB UR QL STRIP.AUTO: ABNORMAL
HYALINE CASTS UR QL AUTO: NORMAL /LPF
KETONES UR QL STRIP: NEGATIVE
LEUKOCYTE ESTERASE UR QL STRIP.AUTO: NEGATIVE
LIPASE SERPL-CCNC: 31 U/L (ref 13–60)
LYMPHOCYTES # BLD AUTO: 2.2 10*3/MM3 (ref 0.7–3.1)
LYMPHOCYTES NFR BLD AUTO: 27.9 % (ref 19.6–45.3)
MCH RBC QN AUTO: 31.1 PG (ref 26.6–33)
MCHC RBC AUTO-ENTMCNC: 33.2 G/DL (ref 31.5–35.7)
MCV RBC AUTO: 93.8 FL (ref 79–97)
MONOCYTES # BLD AUTO: 0.3 10*3/MM3 (ref 0.1–0.9)
MONOCYTES NFR BLD AUTO: 4.3 % (ref 5–12)
NEUTROPHILS NFR BLD AUTO: 5.1 10*3/MM3 (ref 1.7–7)
NEUTROPHILS NFR BLD AUTO: 65.7 % (ref 42.7–76)
NITRITE UR QL STRIP: NEGATIVE
NRBC BLD AUTO-RTO: 0 /100 WBC (ref 0–0.2)
PH UR STRIP.AUTO: 7 [PH] (ref 5–8)
PLATELET # BLD AUTO: 273 10*3/MM3 (ref 140–450)
PMV BLD AUTO: 8.8 FL (ref 6–12)
POTASSIUM SERPL-SCNC: 4.4 MMOL/L (ref 3.5–5.2)
PROT SERPL-MCNC: 7.7 G/DL (ref 6–8.5)
PROT UR QL STRIP: NEGATIVE
RBC # BLD AUTO: 4.14 10*6/MM3 (ref 3.77–5.28)
RBC # UR STRIP: NORMAL /HPF
REF LAB TEST METHOD: NORMAL
SODIUM SERPL-SCNC: 141 MMOL/L (ref 136–145)
SP GR UR STRIP: 1.01 (ref 1–1.03)
SQUAMOUS #/AREA URNS HPF: NORMAL /HPF
UROBILINOGEN UR QL STRIP: ABNORMAL
WBC # UR STRIP: NORMAL /HPF
WBC NRBC COR # BLD AUTO: 7.8 10*3/MM3 (ref 3.4–10.8)
WHOLE BLOOD HOLD COAG: NORMAL

## 2024-02-08 PROCEDURE — 81001 URINALYSIS AUTO W/SCOPE: CPT | Performed by: NURSE PRACTITIONER

## 2024-02-08 PROCEDURE — 25510000001 IOPAMIDOL PER 1 ML: Performed by: NURSE PRACTITIONER

## 2024-02-08 PROCEDURE — 25810000003 LACTATED RINGERS SOLUTION: Performed by: NURSE PRACTITIONER

## 2024-02-08 PROCEDURE — 85025 COMPLETE CBC W/AUTO DIFF WBC: CPT | Performed by: NURSE PRACTITIONER

## 2024-02-08 PROCEDURE — 74177 CT ABD & PELVIS W/CONTRAST: CPT

## 2024-02-08 PROCEDURE — 96375 TX/PRO/DX INJ NEW DRUG ADDON: CPT

## 2024-02-08 PROCEDURE — 99285 EMERGENCY DEPT VISIT HI MDM: CPT

## 2024-02-08 PROCEDURE — 25010000002 ONDANSETRON PER 1 MG: Performed by: NURSE PRACTITIONER

## 2024-02-08 PROCEDURE — 96374 THER/PROPH/DIAG INJ IV PUSH: CPT

## 2024-02-08 PROCEDURE — 25010000002 KETOROLAC TROMETHAMINE PER 15 MG: Performed by: NURSE PRACTITIONER

## 2024-02-08 PROCEDURE — 80053 COMPREHEN METABOLIC PANEL: CPT | Performed by: NURSE PRACTITIONER

## 2024-02-08 PROCEDURE — 83690 ASSAY OF LIPASE: CPT | Performed by: NURSE PRACTITIONER

## 2024-02-08 PROCEDURE — 84703 CHORIONIC GONADOTROPIN ASSAY: CPT | Performed by: NURSE PRACTITIONER

## 2024-02-08 RX ORDER — ONDANSETRON 4 MG/1
4 TABLET, ORALLY DISINTEGRATING ORAL EVERY 8 HOURS PRN
Qty: 9 TABLET | Refills: 0 | Status: SHIPPED | OUTPATIENT
Start: 2024-02-08 | End: 2024-02-11

## 2024-02-08 RX ORDER — KETOROLAC TROMETHAMINE 30 MG/ML
15 INJECTION, SOLUTION INTRAMUSCULAR; INTRAVENOUS ONCE
Status: COMPLETED | OUTPATIENT
Start: 2024-02-08 | End: 2024-02-08

## 2024-02-08 RX ORDER — ONDANSETRON 2 MG/ML
4 INJECTION INTRAMUSCULAR; INTRAVENOUS ONCE
Status: COMPLETED | OUTPATIENT
Start: 2024-02-08 | End: 2024-02-08

## 2024-02-08 RX ORDER — SODIUM CHLORIDE 0.9 % (FLUSH) 0.9 %
10 SYRINGE (ML) INJECTION AS NEEDED
Status: DISCONTINUED | OUTPATIENT
Start: 2024-02-08 | End: 2024-02-08 | Stop reason: HOSPADM

## 2024-02-08 RX ADMIN — IOPAMIDOL 100 ML: 755 INJECTION, SOLUTION INTRAVENOUS at 17:16

## 2024-02-08 RX ADMIN — ONDANSETRON 4 MG: 2 INJECTION INTRAMUSCULAR; INTRAVENOUS at 15:21

## 2024-02-08 RX ADMIN — KETOROLAC TROMETHAMINE 15 MG: 30 INJECTION, SOLUTION INTRAMUSCULAR; INTRAVENOUS at 15:21

## 2024-02-08 RX ADMIN — SODIUM CHLORIDE, POTASSIUM CHLORIDE, SODIUM LACTATE AND CALCIUM CHLORIDE 1000 ML: 600; 310; 30; 20 INJECTION, SOLUTION INTRAVENOUS at 15:20

## 2024-02-08 NOTE — ED PROVIDER NOTES
Subjective   History of Present Illness  38-year-old  female presents to the emergency room with complaint of nausea and vomiting for past 4 to 5 days.  She denies fever or p.m. since Sunday.  Patient states she is passing a little bit of gas.  Patient states that she has been referred to GI for the symptoms but she does not have an appointment until April 1.  Patient states that she vomited 3 times today and has had a significant weight loss over the last several weeks.  Patient states that she was seen at [facility] ER on December 25 for same complaint and was discharged home with some Zofran and told to follow-up with her primary care physician patient states that her primary care is Hanny Brito with [facility] and they referred her to GI.  Patient states that today she has had some left upper abdomen that radiates to the back.      Review of Systems   Constitutional:  Positive for appetite change. Negative for fatigue and fever.   HENT:  Negative for congestion.    Respiratory:  Negative for chest tightness and shortness of breath.    Cardiovascular:  Negative for chest pain.   Gastrointestinal:  Positive for abdominal pain, nausea and vomiting.   Genitourinary:  Negative for dysuria and hematuria.   Musculoskeletal:  Positive for back pain.   Skin:  Negative for rash and wound.   Neurological:  Negative for dizziness, weakness, light-headedness and headaches.   Hematological: Negative.        Past Medical History:   Diagnosis Date    ADHD (attention deficit hyperactivity disorder)     Anxiety     Back pain     DDD (degenerative disc disease), lumbar     mild    Hyperlipidemia     Hypothyroidism     Splenic artery aneurysm     coiled since 9/17/21       Allergies   Allergen Reactions    Atorvastatin Other (See Comments)    Paxil [Paroxetine] Unknown - Low Severity     EPS    Latex Rash       Past Surgical History:   Procedure Laterality Date    ABDOMINAL SURGERY       coiling of spenic aneurysm     SECTION  2016 and 8/2018    x2    ENDOSCOPY N/A 2021    Procedure: ESOPHAGOGASTRODUODENOSCOPY;  Surgeon: Jung Bob MD;  Location: [facility] ENDOSCOPY;  Service: Gastroenterology;  Laterality: N/A;  reflux, esophagitis, gastroparesis    LAPAROSCOPIC SPLENECTOMY      artery aneurysym repair    RADIOFREQUENCY ABLATION  2019    TONSILLECTOMY  2018    TONSILLECTOMY         Family History   Problem Relation Age of Onset    Cancer Maternal Grandmother     Hypertension Paternal Grandmother     Heart disease Paternal Grandmother     Hyperlipidemia Paternal Grandmother     Depression Sister     Depression Brother     Drug abuse Brother        Social History     Socioeconomic History    Marital status:     Number of children: 8    Highest education level: Master's degree (e.g., MA, MS, Jaclyn, MEd, MSW, VENKAT)   Tobacco Use    Smoking status: Never    Smokeless tobacco: Never   Vaping Use    Vaping Use: Never used   Substance and Sexual Activity    Alcohol use: No    Drug use: No    Sexual activity: Defer           Objective   Physical Exam  Constitutional:       General: She is not in acute distress.     Appearance: Normal appearance. She is not ill-appearing, toxic-appearing or diaphoretic.   HENT:      Head: Normocephalic and atraumatic.      Nose: No congestion or rhinorrhea.      Mouth/Throat:      Mouth: Mucous membranes are moist.      Pharynx: Oropharynx is clear.   Eyes:      Extraocular Movements: Extraocular movements intact.      Conjunctiva/sclera: Conjunctivae normal.      Pupils: Pupils are equal, round, and reactive to light.   Cardiovascular:      Rate and Rhythm: Normal rate.      Pulses: Normal pulses.      Heart sounds: Normal heart sounds.   Abdominal:      Palpations: Abdomen is soft.      Tenderness: There is no abdominal tenderness. There is no right CVA tenderness, left CVA tenderness, guarding or rebound.      Hernia: No hernia is  present.   Musculoskeletal:         General: Normal range of motion.      Cervical back: Normal range of motion and neck supple.   Skin:     General: Skin is warm and dry.      Capillary Refill: Capillary refill takes less than 2 seconds.   Neurological:      General: No focal deficit present.      Mental Status: She is alert and oriented to person, place, and time.   Psychiatric:         Mood and Affect: Mood normal.         Behavior: Behavior normal.         Thought Content: Thought content normal.         Judgment: Judgment normal.         Procedures           ED Course      CT Abdomen Pelvis With Contrast    Result Date: 2/8/2024  Impression: 1. Stable examination of the abdomen and pelvis without acute findings noted. Electronically Signed: Court Maldnoado MD  2/8/2024 4:22 PM CST  Workstation ID: [id number]                               Medications   sodium chloride 0.9 % flush 10 mL (has no administration in time range)   lactated ringers bolus 1,000 mL (1,000 mL Intravenous New Bag 2/8/24 1520)   ketorolac (TORADOL) injection 15 mg (15 mg Intravenous Given 2/8/24 1521)   ondansetron (ZOFRAN) injection 4 mg (4 mg Intravenous Given 2/8/24 1521)   iopamidol (ISOVUE-370) 76 % injection 100 mL (100 mL Intravenous Given 2/8/24 1716)            Labs Reviewed   URINALYSIS W/ CULTURE IF INDICATED - Abnormal; Notable for the following components:       Result Value    Blood, UA Trace (*)     All other components within normal limits    Narrative:     In absence of clinical symptoms, the presence of pyuria, bacteria, and/or nitrites on the urinalysis result does not correlate with infection.   CBC WITH AUTO DIFFERENTIAL - Abnormal; Notable for the following components:    Monocyte % 4.3 (*)     All other components within normal limits   LIPASE - Normal   HCG, SERUM, QUALITATIVE - Normal   COMPREHENSIVE METABOLIC PANEL    Narrative:     GFR Normal >60  Chronic Kidney Disease <60  Kidney Failure <15     URINALYSIS,  MICROSCOPIC ONLY   CBC AND DIFFERENTIAL    Narrative:     The following orders were created for panel order CBC & Differential.  Procedure                               Abnormality         Status                     ---------                               -----------         ------                     CBC Auto Differential[[id number]]        Abnormal            Final result                 Please view results for these tests on the individual orders.   EXTRA TUBES    Narrative:     The following orders were created for panel order Extra Tubes.  Procedure                               Abnormality         Status                     ---------                               -----------         ------                     Gold Top - SST[[id number]]                                                              Light Blue Top[[id number]]                                   Final result                 Please view results for these tests on the individual orders.   LIGHT BLUE TOP      Medical Decision Making  38-year-old very well-appearing and in no acute distress presents to the emergency room with complaint of nausea and vomiting and abdominal pain since Sunday.  She denies fever and states she is passing gas.  Patient states that she was seen at [facility] ER on [place] Day and was given a GI referral and contact information.  Patient states that her GI appointment is not until April 1.  Patient states she has not contacted her primary care physician recently for this recent complaint.    Amount and/or Complexity of Data Reviewed  Labs: ordered.     Details: CBC, CMP, lipase and urinalysis all are all very unremarkable and shows no acute abnormality.  Radiology: ordered.     Details: CT of the abdomen and pelvis was obtained    Risk  Prescription drug management.  Risk Details: Discharge home to self-care with a short course of Zofran to help with her subjective nausea and vomiting.  Highly encourage patient to call GI specialist  tomorrow and see if they could schedule a sooner appointment than April 1.  Also advised patient to follow-up with primary care physician within the next week for a follow-up from ER visit.  Patient verbalized understanding.      Vitals:    02/08/24 1724 02/08/24 1725 02/08/24 1726 02/08/24 1727   BP:       BP Location:       Patient Position:       Pulse: 74 78 75 78   Resp:       Temp:       TempSrc:       SpO2: 100% 100% 100% 99%   Weight:       Height:          Final diagnoses:   Generalized abdominal pain   H/O nausea and vomiting       ED Disposition  ED Disposition       ED Disposition   Discharge    Condition   Stable    Comment   --               Art Parker MD  [address]  [address] [place] [address] [phone]    Schedule an appointment as soon as possible for a visit in 2 days  As needed, If symptoms worsen         Medication List        New Prescriptions      ondansetron ODT 4 MG disintegrating tablet  Commonly known as: ZOFRAN-ODT  Place 1 tablet on the tongue Every 8 (Eight) Hours As Needed for Nausea or Vomiting for up to 3 days.               Where to Get Your Medications        These medications were sent to [id number] - [place], [place] - [address] - [phone]  - [phone]   [address] [place] [address]      Phone: [phone]   ondansetron ODT 4 MG disintegrating tablet            Isela Brito APRN  02/08/24 1702

## 2024-02-08 NOTE — DISCHARGE INSTRUCTIONS
Rest and fill and take your Zofran as needed for your nausea and vomiting.  Please call your GI specialist to schedule an appointment for earlier than April 1.  Please see your primary care physician within the next few days for further evaluation and care and follow-up from ER visit.

## 2024-04-01 ENCOUNTER — OFFICE (OUTPATIENT)
Dept: URBAN - METROPOLITAN AREA CLINIC 64 | Facility: CLINIC | Age: 39
End: 2024-04-01
Payer: COMMERCIAL

## 2024-04-01 VITALS
SYSTOLIC BLOOD PRESSURE: 114 MMHG | HEART RATE: 91 BPM | WEIGHT: 144 LBS | DIASTOLIC BLOOD PRESSURE: 79 MMHG | HEIGHT: 66 IN

## 2024-04-01 DIAGNOSIS — K59.00 CONSTIPATION, UNSPECIFIED: ICD-10-CM

## 2024-04-01 DIAGNOSIS — R11.2 NAUSEA WITH VOMITING, UNSPECIFIED: ICD-10-CM

## 2024-04-01 DIAGNOSIS — R19.7 DIARRHEA, UNSPECIFIED: ICD-10-CM

## 2024-04-01 DIAGNOSIS — R63.4 ABNORMAL WEIGHT LOSS: ICD-10-CM

## 2024-04-01 DIAGNOSIS — R13.10 DYSPHAGIA, UNSPECIFIED: ICD-10-CM

## 2024-04-01 DIAGNOSIS — R12 HEARTBURN: ICD-10-CM

## 2024-04-01 PROCEDURE — 99214 OFFICE O/P EST MOD 30 MIN: CPT | Performed by: INTERNAL MEDICINE

## 2024-04-16 LAB
BH CV ECHO MEAS - LA A2CS (ATRIAL LENGTH): 2.8 CM
LV EF 2D ECHO EST: 55 %
MAXIMAL PREDICTED HEART RATE: 182 BPM
STRESS TARGET HR: 155 BPM

## 2024-05-16 ENCOUNTER — ON CAMPUS - OUTPATIENT (OUTPATIENT)
Dept: URBAN - METROPOLITAN AREA HOSPITAL 2 | Facility: HOSPITAL | Age: 39
End: 2024-05-16
Payer: COMMERCIAL

## 2024-05-16 ENCOUNTER — OFFICE (OUTPATIENT)
Dept: URBAN - METROPOLITAN AREA PATHOLOGY 19 | Facility: PATHOLOGY | Age: 39
End: 2024-05-16
Payer: COMMERCIAL

## 2024-05-16 VITALS
RESPIRATION RATE: 16 BRPM | HEART RATE: 94 BPM | RESPIRATION RATE: 23 BRPM | HEART RATE: 86 BPM | HEART RATE: 90 BPM | TEMPERATURE: 98 F | OXYGEN SATURATION: 99 % | SYSTOLIC BLOOD PRESSURE: 112 MMHG | HEART RATE: 97 BPM | DIASTOLIC BLOOD PRESSURE: 74 MMHG | RESPIRATION RATE: 21 BRPM | RESPIRATION RATE: 18 BRPM | SYSTOLIC BLOOD PRESSURE: 113 MMHG | HEART RATE: 102 BPM | HEART RATE: 89 BPM | DIASTOLIC BLOOD PRESSURE: 51 MMHG | HEART RATE: 95 BPM | WEIGHT: 139 LBS | DIASTOLIC BLOOD PRESSURE: 82 MMHG | DIASTOLIC BLOOD PRESSURE: 59 MMHG | DIASTOLIC BLOOD PRESSURE: 61 MMHG | DIASTOLIC BLOOD PRESSURE: 55 MMHG | SYSTOLIC BLOOD PRESSURE: 93 MMHG | HEIGHT: 66 IN | SYSTOLIC BLOOD PRESSURE: 82 MMHG | HEART RATE: 84 BPM | SYSTOLIC BLOOD PRESSURE: 146 MMHG | SYSTOLIC BLOOD PRESSURE: 92 MMHG | SYSTOLIC BLOOD PRESSURE: 77 MMHG | OXYGEN SATURATION: 100 % | RESPIRATION RATE: 15 BRPM | DIASTOLIC BLOOD PRESSURE: 101 MMHG | OXYGEN SATURATION: 98 % | OXYGEN SATURATION: 97 % | SYSTOLIC BLOOD PRESSURE: 89 MMHG

## 2024-05-16 DIAGNOSIS — K29.50 UNSPECIFIED CHRONIC GASTRITIS WITHOUT BLEEDING: ICD-10-CM

## 2024-05-16 DIAGNOSIS — K62.1 RECTAL POLYP: ICD-10-CM

## 2024-05-16 DIAGNOSIS — K52.9 NONINFECTIVE GASTROENTERITIS AND COLITIS, UNSPECIFIED: ICD-10-CM

## 2024-05-16 DIAGNOSIS — R19.7 DIARRHEA, UNSPECIFIED: ICD-10-CM

## 2024-05-16 DIAGNOSIS — R63.4 ABNORMAL WEIGHT LOSS: ICD-10-CM

## 2024-05-16 DIAGNOSIS — K25.3 ACUTE GASTRIC ULCER WITHOUT HEMORRHAGE OR PERFORATION: ICD-10-CM

## 2024-05-16 DIAGNOSIS — K55.039 ACUTE (REVERSIBLE) ISCHEMIA OF LARGE INTESTINE, EXTENT UNSPE: ICD-10-CM

## 2024-05-16 DIAGNOSIS — R13.10 DYSPHAGIA, UNSPECIFIED: ICD-10-CM

## 2024-05-16 DIAGNOSIS — K31.89 OTHER DISEASES OF STOMACH AND DUODENUM: ICD-10-CM

## 2024-05-16 DIAGNOSIS — K64.0 FIRST DEGREE HEMORRHOIDS: ICD-10-CM

## 2024-05-16 DIAGNOSIS — K55.9 VASCULAR DISORDER OF INTESTINE, UNSPECIFIED: ICD-10-CM

## 2024-05-16 PROBLEM — K63.89 OTHER SPECIFIED DISEASES OF INTESTINE: Status: ACTIVE | Noted: 2024-05-16

## 2024-05-16 LAB
GI HISTOLOGY: A. SECOND PART OF THE DUODENUM: (no result)
GI HISTOLOGY: B. STOMACH ANTRUM: (no result)
GI HISTOLOGY: C. WHOLE COLON: (no result)
GI HISTOLOGY: D. RECTUM: (no result)
GI HISTOLOGY: PDF REPORT: (no result)
Lab: (no result)

## 2024-05-16 PROCEDURE — 45380 COLONOSCOPY AND BIOPSY: CPT | Mod: 59 | Performed by: INTERNAL MEDICINE

## 2024-05-16 PROCEDURE — 88305 TISSUE EXAM BY PATHOLOGIST: CPT | Performed by: PATHOLOGY

## 2024-05-16 PROCEDURE — 88342 IMHCHEM/IMCYTCHM 1ST ANTB: CPT | Performed by: PATHOLOGY

## 2024-05-16 PROCEDURE — 43239 EGD BIOPSY SINGLE/MULTIPLE: CPT | Performed by: INTERNAL MEDICINE

## 2024-05-16 PROCEDURE — 45385 COLONOSCOPY W/LESION REMOVAL: CPT | Performed by: INTERNAL MEDICINE

## 2024-12-17 ENCOUNTER — APPOINTMENT (OUTPATIENT)
Dept: CT IMAGING | Facility: HOSPITAL | Age: 39
End: 2024-12-17
Payer: MEDICAID

## 2024-12-17 ENCOUNTER — APPOINTMENT (OUTPATIENT)
Dept: GENERAL RADIOLOGY | Facility: HOSPITAL | Age: 39
End: 2024-12-17
Payer: MEDICAID

## 2024-12-17 ENCOUNTER — HOSPITAL ENCOUNTER (EMERGENCY)
Facility: HOSPITAL | Age: 39
Discharge: HOME | End: 2024-12-17
Attending: EMERGENCY MEDICINE | Admitting: EMERGENCY MEDICINE
Payer: MEDICAID

## 2024-12-17 VITALS
HEIGHT: 66 IN | TEMPERATURE: 98 F | HEART RATE: 97 BPM | DIASTOLIC BLOOD PRESSURE: 91 MMHG | WEIGHT: 138 LBS | SYSTOLIC BLOOD PRESSURE: 117 MMHG | BODY MASS INDEX: 22.18 KG/M2 | OXYGEN SATURATION: 99 % | RESPIRATION RATE: 16 BRPM

## 2024-12-17 DIAGNOSIS — E87.1 HYPONATREMIA: ICD-10-CM
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
## 2024-12-17 DIAGNOSIS — R10.9 ABDOMINAL PAIN, UNSPECIFIED ABDOMINAL LOCATION: Primary | ICD-10-CM

## 2024-12-17 LAB
ALBUMIN SERPL-MCNC: 4.3 G/DL (ref 3.5–5.2)
ALBUMIN/GLOB SERPL: 1.7 G/DL
ALP SERPL-CCNC: 40 U/L (ref 39–117)
ALT SERPL W P-5'-P-CCNC: 7 U/L (ref 1–33)
ANION GAP SERPL CALCULATED.3IONS-SCNC: 10 MMOL/L (ref 5–15)
AST SERPL-CCNC: 14 U/L (ref 1–32)
BASOPHILS # BLD AUTO: 0.04 10*3/MM3 (ref 0–0.2)
BASOPHILS NFR BLD AUTO: 0.4 % (ref 0–1.5)
BILIRUB SERPL-MCNC: 0.4 MG/DL (ref 0–1.2)
BUN SERPL-MCNC: 13 MG/DL (ref 6–20)
BUN/CREAT SERPL: 18.3 (ref 7–25)
CALCIUM SPEC-SCNC: 8.6 MG/DL (ref 8.6–10.5)
CHLORIDE SERPL-SCNC: 97 MMOL/L (ref 98–107)
CO2 SERPL-SCNC: 22 MMOL/L (ref 22–29)
CREAT SERPL-MCNC: 0.71 MG/DL (ref 0.57–1)
DEPRECATED RDW RBC AUTO: 42.7 FL (ref 37–54)
EGFRCR SERPLBLD CKD-EPI 2021: 109.7 ML/MIN/1.73
EOSINOPHIL # BLD AUTO: 0.11 10*3/MM3 (ref 0–0.4)
EOSINOPHIL NFR BLD AUTO: 1.2 % (ref 0.3–6.2)
ERYTHROCYTE [DISTWIDTH] IN BLOOD BY AUTOMATED COUNT: 12.6 % (ref 12.3–15.4)
GLOBULIN UR ELPH-MCNC: 2.6 GM/DL
GLUCOSE SERPL-MCNC: 82 MG/DL (ref 65–99)
HCG SERPL QL: NEGATIVE
HCT VFR BLD AUTO: 37.8 % (ref 34–46.6)
HGB BLD-MCNC: 13.2 G/DL (ref 12–15.9)
IMM GRANULOCYTES # BLD AUTO: 0.02 10*3/MM3 (ref 0–0.05)
IMM GRANULOCYTES NFR BLD AUTO: 0.2 % (ref 0–0.5)
LIPASE SERPL-CCNC: 34 U/L (ref 13–60)
LYMPHOCYTES # BLD AUTO: 1.88 10*3/MM3 (ref 0.7–3.1)
LYMPHOCYTES NFR BLD AUTO: 20.8 % (ref 19.6–45.3)
MCH RBC QN AUTO: 32.1 PG (ref 26.6–33)
MCHC RBC AUTO-ENTMCNC: 34.9 G/DL (ref 31.5–35.7)
MCV RBC AUTO: 92 FL (ref 79–97)
MONOCYTES # BLD AUTO: 0.37 10*3/MM3 (ref 0.1–0.9)
MONOCYTES NFR BLD AUTO: 4.1 % (ref 5–12)
NEUTROPHILS NFR BLD AUTO: 6.64 10*3/MM3 (ref 1.7–7)
NEUTROPHILS NFR BLD AUTO: 73.3 % (ref 42.7–76)
NRBC BLD AUTO-RTO: 0 /100 WBC (ref 0–0.2)
PLATELET # BLD AUTO: 200 10*3/MM3 (ref 140–450)
PMV BLD AUTO: 11.6 FL (ref 6–12)
POTASSIUM SERPL-SCNC: 3.7 MMOL/L (ref 3.5–5.2)
PROT SERPL-MCNC: 6.9 G/DL (ref 6–8.5)
RBC # BLD AUTO: 4.11 10*6/MM3 (ref 3.77–5.28)
RBC MORPH BLD: NORMAL
SMALL PLATELETS BLD QL SMEAR: ADEQUATE
SODIUM SERPL-SCNC: 129 MMOL/L (ref 136–145)
WBC MORPH BLD: NORMAL
WBC NRBC COR # BLD AUTO: 9.06 10*3/MM3 (ref 3.4–10.8)

## 2024-12-17 PROCEDURE — 25510000001 IOPAMIDOL PER 1 ML: Performed by: EMERGENCY MEDICINE

## 2024-12-17 PROCEDURE — 80053 COMPREHEN METABOLIC PANEL: CPT | Performed by: EMERGENCY MEDICINE

## 2024-12-17 PROCEDURE — 85025 COMPLETE CBC W/AUTO DIFF WBC: CPT | Performed by: EMERGENCY MEDICINE

## 2024-12-17 PROCEDURE — 36415 COLL VENOUS BLD VENIPUNCTURE: CPT

## 2024-12-17 PROCEDURE — 84703 CHORIONIC GONADOTROPIN ASSAY: CPT | Performed by: EMERGENCY MEDICINE

## 2024-12-17 PROCEDURE — 99285 EMERGENCY DEPT VISIT HI MDM: CPT

## 2024-12-17 PROCEDURE — 74177 CT ABD & PELVIS W/CONTRAST: CPT

## 2024-12-17 PROCEDURE — 93005 ELECTROCARDIOGRAM TRACING: CPT | Performed by: EMERGENCY MEDICINE

## 2024-12-17 PROCEDURE — 83690 ASSAY OF LIPASE: CPT | Performed by: EMERGENCY MEDICINE

## 2024-12-17 PROCEDURE — 71045 X-RAY EXAM CHEST 1 VIEW: CPT

## 2024-12-17 PROCEDURE — 85007 BL SMEAR W/DIFF WBC COUNT: CPT | Performed by: EMERGENCY MEDICINE

## 2024-12-17 RX ORDER — ACETAMINOPHEN 500 MG
1000 TABLET ORAL ONCE
Status: COMPLETED | OUTPATIENT
Start: 2024-12-17 | End: 2024-12-17

## 2024-12-17 RX ORDER — SODIUM CHLORIDE 0.9 % (FLUSH) 0.9 %
10 SYRINGE (ML) INJECTION AS NEEDED
Status: DISCONTINUED | OUTPATIENT
Start: 2024-12-17 | End: 2024-12-17 | Stop reason: HOSPADM

## 2024-12-17 RX ORDER — IOPAMIDOL 755 MG/ML
100 INJECTION, SOLUTION INTRAVASCULAR
Status: COMPLETED | OUTPATIENT
Start: 2024-12-17 | End: 2024-12-17

## 2024-12-17 RX ADMIN — IOPAMIDOL 100 ML: 755 INJECTION, SOLUTION INTRAVENOUS at 18:19

## 2024-12-17 RX ADMIN — ACETAMINOPHEN 1000 MG: 500 TABLET, FILM COATED ORAL at 18:40

## 2024-12-18 LAB
QT INTERVAL: 318 MS
QTC INTERVAL: 436 MS

## (undated) DEVICE — PAPR PRNT PK SONY W RIBN UPC55

## (undated) DEVICE — PK ENDO GI 50

## (undated) DEVICE — BITEBLOCK ENDO W/STRAP 60F A/ LF DISP